# Patient Record
Sex: FEMALE | Race: WHITE | NOT HISPANIC OR LATINO | Employment: FULL TIME | URBAN - METROPOLITAN AREA
[De-identification: names, ages, dates, MRNs, and addresses within clinical notes are randomized per-mention and may not be internally consistent; named-entity substitution may affect disease eponyms.]

---

## 2023-05-04 ENCOUNTER — OFFICE VISIT (OUTPATIENT)
Dept: PRIMARY CARE | Facility: CLINIC | Age: 48
End: 2023-05-04
Payer: COMMERCIAL

## 2023-05-04 VITALS
DIASTOLIC BLOOD PRESSURE: 78 MMHG | WEIGHT: 267.8 LBS | SYSTOLIC BLOOD PRESSURE: 124 MMHG | BODY MASS INDEX: 41.32 KG/M2 | TEMPERATURE: 97.7 F

## 2023-05-04 DIAGNOSIS — F41.9 ANXIETY: Primary | ICD-10-CM

## 2023-05-04 PROCEDURE — 99213 OFFICE O/P EST LOW 20 MIN: CPT | Performed by: INTERNAL MEDICINE

## 2023-05-04 PROCEDURE — 3008F BODY MASS INDEX DOCD: CPT | Performed by: INTERNAL MEDICINE

## 2023-05-04 RX ORDER — DIAPER,BRIEF,INFANT-TODD,DISP
1 EACH MISCELLANEOUS DAILY
COMMUNITY
Start: 2022-06-14 | End: 2024-02-08 | Stop reason: HOSPADM

## 2023-05-04 RX ORDER — VENLAFAXINE HYDROCHLORIDE 150 MG/1
150 CAPSULE, EXTENDED RELEASE ORAL DAILY
COMMUNITY
End: 2023-05-15

## 2023-05-04 RX ORDER — ALPRAZOLAM 0.25 MG/1
0.25 TABLET ORAL 3 TIMES DAILY PRN
Qty: 30 TABLET | Refills: 0 | Status: SHIPPED | OUTPATIENT
Start: 2023-05-04 | End: 2023-05-28 | Stop reason: SINTOL

## 2023-05-04 RX ORDER — ZINC GLUCONATE 50 MG
1 TABLET ORAL DAILY
COMMUNITY
Start: 2022-06-14

## 2023-05-04 RX ORDER — VITAMIN A 3000 MCG
10000 CAPSULE ORAL DAILY
COMMUNITY
Start: 2022-06-14

## 2023-05-04 RX ORDER — MULTIVIT WITH MINERALS/HERBS
1 TABLET ORAL DAILY
COMMUNITY
Start: 2022-06-14

## 2023-05-04 RX ORDER — LORATADINE 10 MG/1
TABLET ORAL
COMMUNITY
Start: 2017-09-13 | End: 2024-02-06 | Stop reason: ENTERED-IN-ERROR

## 2023-05-04 RX ORDER — PANTOPRAZOLE SODIUM 40 MG/1
1 TABLET, DELAYED RELEASE ORAL 2 TIMES DAILY
COMMUNITY
Start: 2018-01-28

## 2023-05-04 ASSESSMENT — PATIENT HEALTH QUESTIONNAIRE - PHQ9
SUM OF ALL RESPONSES TO PHQ9 QUESTIONS 1 AND 2: 6
1. LITTLE INTEREST OR PLEASURE IN DOING THINGS: NEARLY EVERY DAY
2. FEELING DOWN, DEPRESSED OR HOPELESS: NEARLY EVERY DAY

## 2023-05-04 NOTE — PROGRESS NOTES
Subjective   Patient ID: [id number]   [facility]    Gonzalez Jason is a 48 y.o. female who presents for Panic Attack (Shes taking effexor- been under amount of stress with her daughter. ) and Back Pain (Back and neck pain been happening about three weeks).  She does not have any fever, cough or shortness of breath. She is having difficulty to sleep for her anxiety.      Objective   Visit Vitals  /78 (BP Location: Left arm)   Temp 36.5 °C (97.7 °F) (Temporal)      Review of Systems  All 12 systems reviewed, no other abnormality except that mentioned in HPI.    Physical Exam  Constitutional- no abnormality  ENT- no abnormality  Neck- no swelling  CVS- normal S1 and S2, no murmur.  Pulmonary- clear to auscultation,no rhonchi, no wheezes.  Abdomen- normal- liver and spleen, soft, no distension, bowel sound present.  Neurological- all cranial nerves intact, speech and gait normal, no sensory or motor deficiency.  Musculoskeletal- all pulses are normal, normal movement, no joint swelling.  Skin- no rash, dry.  psychiatry- no suicidal ideation.  Genitourinary system- no abnormality.  Lymph node- no lyphadenopathy      Assessment/Plan   Problem List Items Addressed This Visit    None  Visit Diagnoses       Anxiety    -  Primary    Relevant Medications    ALPRAZolam (Xanax) 0.25 mg tablet        Does not want to take any other medications now, she will try with this medication to have a good sleep to deal with this problem.    Gilda Mendez MD

## 2023-05-10 DIAGNOSIS — F41.9 ANXIETY AND DEPRESSION: Primary | ICD-10-CM

## 2023-05-10 DIAGNOSIS — F32.A ANXIETY AND DEPRESSION: Primary | ICD-10-CM

## 2023-05-14 DIAGNOSIS — F32.A ANXIETY AND DEPRESSION: Primary | ICD-10-CM

## 2023-05-14 DIAGNOSIS — F41.9 ANXIETY AND DEPRESSION: Primary | ICD-10-CM

## 2023-05-15 RX ORDER — VENLAFAXINE HYDROCHLORIDE 150 MG/1
CAPSULE, EXTENDED RELEASE ORAL
Qty: 90 CAPSULE | Refills: 2 | Status: SHIPPED | OUTPATIENT
Start: 2023-05-15 | End: 2023-10-04

## 2023-05-26 ENCOUNTER — OFFICE VISIT (OUTPATIENT)
Dept: PRIMARY CARE | Facility: CLINIC | Age: 48
End: 2023-05-26
Payer: COMMERCIAL

## 2023-05-26 VITALS
WEIGHT: 269 LBS | BODY MASS INDEX: 41.51 KG/M2 | DIASTOLIC BLOOD PRESSURE: 82 MMHG | SYSTOLIC BLOOD PRESSURE: 134 MMHG | TEMPERATURE: 97.6 F

## 2023-05-26 DIAGNOSIS — R73.01 IMPAIRED FASTING BLOOD SUGAR: ICD-10-CM

## 2023-05-26 DIAGNOSIS — Z12.39 BREAST SCREENING: ICD-10-CM

## 2023-05-26 DIAGNOSIS — Z00.00 ROUTINE GENERAL MEDICAL EXAMINATION AT A HEALTH CARE FACILITY: ICD-10-CM

## 2023-05-26 DIAGNOSIS — G47.00 INSOMNIA, UNSPECIFIED TYPE: Primary | ICD-10-CM

## 2023-05-26 DIAGNOSIS — M79.10 MYALGIA: ICD-10-CM

## 2023-05-26 PROCEDURE — 99213 OFFICE O/P EST LOW 20 MIN: CPT | Performed by: INTERNAL MEDICINE

## 2023-05-26 PROCEDURE — 3008F BODY MASS INDEX DOCD: CPT | Performed by: INTERNAL MEDICINE

## 2023-05-26 PROCEDURE — 1036F TOBACCO NON-USER: CPT | Performed by: INTERNAL MEDICINE

## 2023-05-26 RX ORDER — LISDEXAMFETAMINE DIMESYLATE 30 MG/1
1 CAPSULE ORAL DAILY
COMMUNITY
Start: 2023-05-18 | End: 2023-10-04

## 2023-05-26 RX ORDER — VENLAFAXINE HYDROCHLORIDE 75 MG/1
75 CAPSULE, EXTENDED RELEASE ORAL DAILY
COMMUNITY
Start: 2021-06-02 | End: 2023-05-28

## 2023-05-26 RX ORDER — TRAZODONE HYDROCHLORIDE 50 MG/1
50 TABLET ORAL NIGHTLY PRN
Qty: 90 TABLET | Refills: 1 | Status: SHIPPED | OUTPATIENT
Start: 2023-05-26 | End: 2023-11-20

## 2023-05-26 NOTE — PROGRESS NOTES
Subjective   Patient ID: Gonzalez Jason is a 48 y.o. female who presents for Follow-up (Anxiety/ ADD / lymphedema).    HPI   Follow-up insomnia and mood disorder.  Significant stress.  Leading to diffuse achiness.  Insomnia.  Working with psychiatry for ADD.  Review of Systems    Objective   /82   Temp 36.4 °C (97.6 °F)   Wt 122 kg (269 lb)   BMI 41.51 kg/m²     Physical Exam    Assessment/Plan     #1 COVID-recovered.  #2 s/p sleeve surgery- 2021. signif wt loss. follows w/ surgery in [place]. following labs there   #3 IFBS-good on last test. Patient will obtain labs. Continue lifestyle  #4 htn-good without treatment and surgery  #5 GERD-controlled with PPI.  #6 depression/anxiety-significant increase stress.  Overall doing well.  But certainly an issue.  No significant benefit from increase venlafaxine.  Reduce back to 150.  Follow as we add trazodone for insomnia.  Reviewed use risk and side effects.  Did discuss risks of serotonin syndrome.  #7 hepatic hemangioma- ultrasound last year in [place] with reduced size. Per hepatobiliary surgery no follow-up recommended. Will consider imaging next visit.  #8 ADD-recently started stimulant with psychiatry.  Continue.  Follow-up psychiatry at least for 1 or 2 more visits.  Tolerating and does seem to be helping has not made anxiety/insomnia worse.  mammo 6/22  Repeat cologuard pending-insufficient stool on last test.  Stressed importance of having this done.  lipids- will bring labs next visit (+fhx CAD). c/s repeat CACS . Strong family history of CAD. Consider low-dose statin

## 2023-06-01 ENCOUNTER — LAB (OUTPATIENT)
Dept: LAB | Facility: LAB | Age: 48
End: 2023-06-01
Payer: COMMERCIAL

## 2023-06-01 ENCOUNTER — APPOINTMENT (OUTPATIENT)
Dept: PRIMARY CARE | Facility: CLINIC | Age: 48
End: 2023-06-01
Payer: COMMERCIAL

## 2023-06-01 DIAGNOSIS — R73.01 IMPAIRED FASTING BLOOD SUGAR: ICD-10-CM

## 2023-06-01 DIAGNOSIS — M79.10 MYALGIA: ICD-10-CM

## 2023-06-01 LAB
ESTIMATED AVERAGE GLUCOSE FOR HBA1C: 103 MG/DL
HEMOGLOBIN A1C/HEMOGLOBIN TOTAL IN BLOOD: 5.2 %
SEDIMENTATION RATE, ERYTHROCYTE: 10 MM/H (ref 0–20)

## 2023-06-01 PROCEDURE — 83036 HEMOGLOBIN GLYCOSYLATED A1C: CPT

## 2023-06-01 PROCEDURE — 36415 COLL VENOUS BLD VENIPUNCTURE: CPT

## 2023-06-01 PROCEDURE — 85652 RBC SED RATE AUTOMATED: CPT

## 2023-08-25 DIAGNOSIS — U07.1 COVID-19: Primary | ICD-10-CM

## 2023-08-30 DIAGNOSIS — Z12.11 COLON CANCER SCREENING: ICD-10-CM

## 2023-09-20 ENCOUNTER — APPOINTMENT (OUTPATIENT)
Dept: PRIMARY CARE | Facility: CLINIC | Age: 48
End: 2023-09-20
Payer: COMMERCIAL

## 2023-09-20 ENCOUNTER — TELEPHONE (OUTPATIENT)
Dept: PRIMARY CARE | Facility: CLINIC | Age: 48
End: 2023-09-20

## 2023-09-20 ENCOUNTER — LAB (OUTPATIENT)
Dept: LAB | Facility: LAB | Age: 48
End: 2023-09-20
Payer: COMMERCIAL

## 2023-09-20 DIAGNOSIS — R35.0 URINARY FREQUENCY: ICD-10-CM

## 2023-09-20 LAB
APPEARANCE, URINE: ABNORMAL
BACTERIA, URINE: ABNORMAL /HPF
BILIRUBIN, URINE: NEGATIVE
BLOOD, URINE: ABNORMAL
COLOR, URINE: YELLOW
GLUCOSE, URINE: NEGATIVE MG/DL
KETONES, URINE: NEGATIVE MG/DL
LEUKOCYTE ESTERASE, URINE: ABNORMAL
MUCUS, URINE: ABNORMAL /LPF
NITRITE, URINE: NEGATIVE
PH, URINE: 6 (ref 5–8)
PROTEIN, URINE: ABNORMAL MG/DL
RBC, URINE: 149 /HPF (ref 0–5)
RENAL EPITHELIAL CELLS, URINE: 3 /HPF
SPECIFIC GRAVITY, URINE: 1.02 (ref 1–1.03)
SQUAMOUS EPITHELIAL CELLS, URINE: 41 /HPF
UROBILINOGEN, URINE: <2 MG/DL (ref 0–1.9)
WBC, URINE: >182 /HPF (ref 0–5)

## 2023-09-20 PROCEDURE — 87086 URINE CULTURE/COLONY COUNT: CPT

## 2023-09-20 PROCEDURE — 81001 URINALYSIS AUTO W/SCOPE: CPT

## 2023-09-20 NOTE — TELEPHONE ENCOUNTER
Pt left a msg stating that she has a UTI and is looking either to drop off a sample or get a script.  Her concern is that she is leaving out of town on Friday, and sometimes gets kidney stones with her UTI's.

## 2023-09-21 LAB — URINE CULTURE: NORMAL

## 2023-09-22 ENCOUNTER — TELEPHONE (OUTPATIENT)
Dept: PRIMARY CARE | Facility: CLINIC | Age: 48
End: 2023-09-22
Payer: COMMERCIAL

## 2023-09-22 DIAGNOSIS — R31.9 URINARY TRACT INFECTION WITH HEMATURIA, SITE UNSPECIFIED: ICD-10-CM

## 2023-09-22 DIAGNOSIS — N39.0 URINARY TRACT INFECTION WITH HEMATURIA, SITE UNSPECIFIED: ICD-10-CM

## 2023-09-22 RX ORDER — NITROFURANTOIN 25; 75 MG/1; MG/1
100 CAPSULE ORAL 2 TIMES DAILY
Qty: 10 CAPSULE | Refills: 0 | Status: SHIPPED | OUTPATIENT
Start: 2023-09-22 | End: 2023-09-27

## 2023-09-25 ENCOUNTER — OFFICE VISIT (OUTPATIENT)
Dept: PRIMARY CARE | Facility: CLINIC | Age: 48
End: 2023-09-25
Payer: COMMERCIAL

## 2023-09-25 VITALS
TEMPERATURE: 98.5 F | BODY MASS INDEX: 41.47 KG/M2 | SYSTOLIC BLOOD PRESSURE: 126 MMHG | HEIGHT: 69 IN | WEIGHT: 280 LBS | DIASTOLIC BLOOD PRESSURE: 88 MMHG

## 2023-09-25 DIAGNOSIS — N20.0 NEPHROLITHIASIS: Primary | ICD-10-CM

## 2023-09-25 DIAGNOSIS — Z98.84 H/O GASTRIC BYPASS: ICD-10-CM

## 2023-09-25 DIAGNOSIS — Z00.00 ROUTINE GENERAL MEDICAL EXAMINATION AT A HEALTH CARE FACILITY: ICD-10-CM

## 2023-09-25 PROCEDURE — 3008F BODY MASS INDEX DOCD: CPT | Performed by: INTERNAL MEDICINE

## 2023-09-25 PROCEDURE — 99214 OFFICE O/P EST MOD 30 MIN: CPT | Performed by: INTERNAL MEDICINE

## 2023-09-25 PROCEDURE — 1036F TOBACCO NON-USER: CPT | Performed by: INTERNAL MEDICINE

## 2023-09-25 RX ORDER — CEFPODOXIME PROXETIL 200 MG/1
200 TABLET, FILM COATED ORAL 2 TIMES DAILY
COMMUNITY
Start: 2023-09-22 | End: 2023-10-02

## 2023-09-25 ASSESSMENT — PROMIS GLOBAL HEALTH SCALE
CARRYOUT_SOCIAL_ACTIVITIES: GOOD
CARRYOUT_PHYSICAL_ACTIVITIES: MODERATELY
RATE_QUALITY_OF_LIFE: GOOD
EMOTIONAL_PROBLEMS: SOMETIMES
RATE_PHYSICAL_HEALTH: GOOD
RATE_GENERAL_HEALTH: FAIR
RATE_AVERAGE_FATIGUE: MILD
RATE_AVERAGE_PAIN: 1
RATE_SOCIAL_SATISFACTION: GOOD
RATE_MENTAL_HEALTH: GOOD

## 2023-09-25 ASSESSMENT — PATIENT HEALTH QUESTIONNAIRE - PHQ9
1. LITTLE INTEREST OR PLEASURE IN DOING THINGS: SEVERAL DAYS
SUM OF ALL RESPONSES TO PHQ9 QUESTIONS 1 AND 2: 2
2. FEELING DOWN, DEPRESSED OR HOPELESS: SEVERAL DAYS

## 2023-09-25 NOTE — PROGRESS NOTES
"Subjective   Patient ID: Gonzalez Jason is a 48 y.o. female who presents for Annual Exam (Health form) and Flu Vaccine.    HPI   About 10 days ago began having some urinary pressure sensation as well as right flank pain.  Significantly increased 4 days prior to visit.  Presented to emergency department.  Ultimately diagnosed with pyelonephritis and placed on antibiotics.  Was having fevers to 102 degrees.  Continues now with low-grade fevers.  Ongoing flank pain although improved.  Nausea persist but perhaps a little better.  Question of stone seen on CT.  Does have history of stones (no clear stone seen on CT per report but per ED stone noted)    Review of Systems  All systems reviewed and negative except as per history of present illness  Objective   /88   Ht 1.753 m (5' 9\")   Wt 127 kg (280 lb)   BMI 41.35 kg/m²     Physical Exam    Assessment/Plan     Apparent pyelonephritis.  Question of stones.  Her demeanor of pacing with pain crampy flank pain etc. certainly seems like a stone.  Urinalysis and culture reviewed from [facility], on susceptible antibiotic.  We will continue current treatment for now.  Follow-up 1 day.  Asked patient to go directly to the ED any progression.  Consult urology.    [place] pending 10/23       "

## 2023-09-26 ENCOUNTER — LAB (OUTPATIENT)
Dept: LAB | Facility: LAB | Age: 48
End: 2023-09-26
Payer: COMMERCIAL

## 2023-09-26 DIAGNOSIS — Z00.00 ROUTINE GENERAL MEDICAL EXAMINATION AT A HEALTH CARE FACILITY: ICD-10-CM

## 2023-09-26 DIAGNOSIS — Z98.84 H/O GASTRIC BYPASS: ICD-10-CM

## 2023-09-26 DIAGNOSIS — N20.0 NEPHROLITHIASIS: ICD-10-CM

## 2023-09-26 LAB
ALANINE AMINOTRANSFERASE (SGPT) (U/L) IN SER/PLAS: 61 U/L (ref 7–45)
ALBUMIN (G/DL) IN SER/PLAS: 3.5 G/DL (ref 3.4–5)
ALKALINE PHOSPHATASE (U/L) IN SER/PLAS: 99 U/L (ref 33–110)
ANION GAP IN SER/PLAS: 11 MMOL/L (ref 10–20)
APPEARANCE, URINE: ABNORMAL
ASPARTATE AMINOTRANSFERASE (SGOT) (U/L) IN SER/PLAS: 28 U/L (ref 9–39)
BILIRUBIN TOTAL (MG/DL) IN SER/PLAS: 0.6 MG/DL (ref 0–1.2)
BILIRUBIN, URINE: NEGATIVE
BLOOD, URINE: ABNORMAL
CALCIUM (MG/DL) IN SER/PLAS: 8.5 MG/DL (ref 8.6–10.3)
CARBON DIOXIDE, TOTAL (MMOL/L) IN SER/PLAS: 31 MMOL/L (ref 21–32)
CHLORIDE (MMOL/L) IN SER/PLAS: 102 MMOL/L (ref 98–107)
CHOLESTEROL (MG/DL) IN SER/PLAS: 182 MG/DL (ref 0–199)
CHOLESTEROL IN HDL (MG/DL) IN SER/PLAS: 53 MG/DL
CHOLESTEROL/HDL RATIO: 3.4
COBALAMIN (VITAMIN B12) (PG/ML) IN SER/PLAS: 520 PG/ML (ref 211–911)
COLOR, URINE: YELLOW
CREATININE (MG/DL) IN SER/PLAS: 0.63 MG/DL (ref 0.5–1.05)
ERYTHROCYTE DISTRIBUTION WIDTH (RATIO) BY AUTOMATED COUNT: 12.8 % (ref 11.5–14.5)
ERYTHROCYTE MEAN CORPUSCULAR HEMOGLOBIN CONCENTRATION (G/DL) BY AUTOMATED: 32.2 G/DL (ref 32–36)
ERYTHROCYTE MEAN CORPUSCULAR VOLUME (FL) BY AUTOMATED COUNT: 93 FL (ref 80–100)
ERYTHROCYTES (10*6/UL) IN BLOOD BY AUTOMATED COUNT: 4.92 X10E12/L (ref 4–5.2)
FERRITIN (UG/LL) IN SER/PLAS: 76 UG/L (ref 8–150)
FOLATE (NG/ML) IN SER/PLAS: 14.7 NG/ML
GFR FEMALE: >90 ML/MIN/1.73M2
GLUCOSE (MG/DL) IN SER/PLAS: 84 MG/DL (ref 74–99)
GLUCOSE, URINE: NEGATIVE MG/DL
HEMATOCRIT (%) IN BLOOD BY AUTOMATED COUNT: 45.7 % (ref 36–46)
HEMOGLOBIN (G/DL) IN BLOOD: 14.7 G/DL (ref 12–16)
IRON (UG/DL) IN SER/PLAS: 72 UG/DL (ref 35–150)
IRON BINDING CAPACITY (UG/DL) IN SER/PLAS: 315 UG/DL (ref 240–445)
IRON SATURATION (%) IN SER/PLAS: 23 % (ref 25–45)
KETONES, URINE: NEGATIVE MG/DL
LDL: 109 MG/DL (ref 0–99)
LEUKOCYTE ESTERASE, URINE: ABNORMAL
LEUKOCYTES (10*3/UL) IN BLOOD BY AUTOMATED COUNT: 5.6 X10E9/L (ref 4.4–11.3)
LIPASE (U/L) IN SER/PLAS: 10 U/L (ref 9–82)
MUCUS, URINE: ABNORMAL /LPF
NITRITE, URINE: NEGATIVE
PH, URINE: 6 (ref 5–8)
PLATELETS (10*3/UL) IN BLOOD AUTOMATED COUNT: 288 X10E9/L (ref 150–450)
POTASSIUM (MMOL/L) IN SER/PLAS: 4.7 MMOL/L (ref 3.5–5.3)
PROTEIN TOTAL: 6 G/DL (ref 6.4–8.2)
PROTEIN, URINE: ABNORMAL MG/DL
RBC, URINE: 100 /HPF (ref 0–5)
SODIUM (MMOL/L) IN SER/PLAS: 139 MMOL/L (ref 136–145)
SPECIFIC GRAVITY, URINE: 1.02 (ref 1–1.03)
SQUAMOUS EPITHELIAL CELLS, URINE: 21 /HPF
TRIGLYCERIDE (MG/DL) IN SER/PLAS: 98 MG/DL (ref 0–149)
UREA NITROGEN (MG/DL) IN SER/PLAS: 13 MG/DL (ref 6–23)
UROBILINOGEN, URINE: 2 MG/DL (ref 0–1.9)
VLDL: 20 MG/DL (ref 0–40)
WBC, URINE: 38 /HPF (ref 0–5)

## 2023-09-26 PROCEDURE — 80061 LIPID PANEL: CPT

## 2023-09-26 PROCEDURE — 83540 ASSAY OF IRON: CPT

## 2023-09-26 PROCEDURE — 82607 VITAMIN B-12: CPT

## 2023-09-26 PROCEDURE — 82746 ASSAY OF FOLIC ACID SERUM: CPT

## 2023-09-26 PROCEDURE — 83550 IRON BINDING TEST: CPT

## 2023-09-26 PROCEDURE — 80053 COMPREHEN METABOLIC PANEL: CPT

## 2023-09-26 PROCEDURE — 81001 URINALYSIS AUTO W/SCOPE: CPT

## 2023-09-26 PROCEDURE — 83690 ASSAY OF LIPASE: CPT

## 2023-09-26 PROCEDURE — 85027 COMPLETE CBC AUTOMATED: CPT

## 2023-09-26 PROCEDURE — 87086 URINE CULTURE/COLONY COUNT: CPT

## 2023-09-26 PROCEDURE — 36415 COLL VENOUS BLD VENIPUNCTURE: CPT

## 2023-09-26 PROCEDURE — 82728 ASSAY OF FERRITIN: CPT

## 2023-09-26 PROCEDURE — 84425 ASSAY OF VITAMIN B-1: CPT

## 2023-09-28 LAB — URINE CULTURE: NORMAL

## 2023-10-02 ENCOUNTER — OFFICE VISIT (OUTPATIENT)
Dept: PRIMARY CARE | Facility: CLINIC | Age: 48
End: 2023-10-02
Payer: COMMERCIAL

## 2023-10-02 VITALS — TEMPERATURE: 97.9 F | SYSTOLIC BLOOD PRESSURE: 126 MMHG | DIASTOLIC BLOOD PRESSURE: 80 MMHG

## 2023-10-02 DIAGNOSIS — N10 ACUTE PYELONEPHRITIS: ICD-10-CM

## 2023-10-02 DIAGNOSIS — Z98.84 H/O GASTRIC BYPASS: ICD-10-CM

## 2023-10-02 DIAGNOSIS — R74.8 ELEVATED LIVER ENZYMES: ICD-10-CM

## 2023-10-02 DIAGNOSIS — M54.31 SCIATICA OF RIGHT SIDE: Primary | ICD-10-CM

## 2023-10-02 DIAGNOSIS — E66.01 CLASS 3 SEVERE OBESITY WITHOUT SERIOUS COMORBIDITY WITH BODY MASS INDEX (BMI) OF 40.0 TO 44.9 IN ADULT, UNSPECIFIED OBESITY TYPE (MULTI): ICD-10-CM

## 2023-10-02 LAB — VITAMIN B1, WHOLE BLOOD: 160 NMOL/L (ref 70–180)

## 2023-10-02 PROCEDURE — 99214 OFFICE O/P EST MOD 30 MIN: CPT | Performed by: INTERNAL MEDICINE

## 2023-10-02 PROCEDURE — 1036F TOBACCO NON-USER: CPT | Performed by: INTERNAL MEDICINE

## 2023-10-02 PROCEDURE — 3008F BODY MASS INDEX DOCD: CPT | Performed by: INTERNAL MEDICINE

## 2023-10-02 NOTE — PROGRESS NOTES
Subjective   Patient ID: Gonzalez Jason is a 48 y.o. female who presents for follow up results.    HPI   Over past years w/ episodic rt back pain (1-2x/week) lasting minutes    Pelvic pain/dysuria/freq/nocutia ~ 2weeks PTV.  Progressed.    ~10 days PTV rt LBP--> rt posterior leg to knee    Then chills, nausea and increased pain (pelvis/back)    Review of Systems      Lab Results   Component Value Date    WBC 5.6 09/26/2023    HGB 14.7 09/26/2023    HCT 45.7 09/26/2023     09/26/2023    CHOL 182 09/26/2023    TRIG 98 09/26/2023    HDL 53.0 09/26/2023    ALT 61 (H) 09/26/2023    AST 28 09/26/2023     09/26/2023    K 4.7 09/26/2023     09/26/2023    CREATININE 0.63 09/26/2023    BUN 13 09/26/2023    CO2 31 09/26/2023    TSH 1.12 08/29/2022    HGBA1C 5.2 06/01/2023      Objective   /80   Temp 36.6 °C (97.9 °F)     Physical Exam  Alert and oriented x3.  No acute distress.  Some tenderness to palpation right SI joint.  Positive straight leg raise on the right at 40 degrees.  DTRs normal bilateral lower extremities.  Strength normal.  Assessment/Plan     [place]it[place]-gradually improving.  Consult PT and pain medicine.    Pylonephritis-clinically resolved.  Recheck urinalysis and culture today.  Appointment pending with urology      ALT-suspect fatty liver.  Reviewed at length.  Recheck labs with full panel.  Further evaluation pending

## 2023-10-04 ENCOUNTER — OFFICE VISIT (OUTPATIENT)
Dept: BEHAVIORAL HEALTH | Facility: CLINIC | Age: 48
End: 2023-10-04
Payer: COMMERCIAL

## 2023-10-04 VITALS
HEART RATE: 73 BPM | WEIGHT: 279.06 LBS | TEMPERATURE: 97.8 F | SYSTOLIC BLOOD PRESSURE: 130 MMHG | BODY MASS INDEX: 41.33 KG/M2 | RESPIRATION RATE: 20 BRPM | DIASTOLIC BLOOD PRESSURE: 96 MMHG | HEIGHT: 69 IN

## 2023-10-04 DIAGNOSIS — F32.A ANXIETY AND DEPRESSION: ICD-10-CM

## 2023-10-04 DIAGNOSIS — F41.9 ANXIETY AND DEPRESSION: ICD-10-CM

## 2023-10-04 DIAGNOSIS — F33.41 RECURRENT MAJOR DEPRESSIVE DISORDER, IN PARTIAL REMISSION (CMS-HCC): ICD-10-CM

## 2023-10-04 DIAGNOSIS — F98.8 ATTENTION DEFICIT DISORDER (ADD) WITHOUT HYPERACTIVITY: ICD-10-CM

## 2023-10-04 PROCEDURE — 1036F TOBACCO NON-USER: CPT | Performed by: PSYCHIATRY & NEUROLOGY

## 2023-10-04 PROCEDURE — 3008F BODY MASS INDEX DOCD: CPT | Performed by: PSYCHIATRY & NEUROLOGY

## 2023-10-04 PROCEDURE — 99214 OFFICE O/P EST MOD 30 MIN: CPT | Performed by: PSYCHIATRY & NEUROLOGY

## 2023-10-04 RX ORDER — DULOXETIN HYDROCHLORIDE 20 MG/1
20 CAPSULE, DELAYED RELEASE ORAL DAILY
Qty: 90 CAPSULE | Refills: 0 | Status: SHIPPED | OUTPATIENT
Start: 2023-10-04 | End: 2023-11-10

## 2023-10-04 RX ORDER — LISDEXAMFETAMINE DIMESYLATE 50 MG/1
50 CAPSULE ORAL EVERY MORNING
Qty: 30 CAPSULE | Refills: 0 | Status: SHIPPED | OUTPATIENT
Start: 2023-10-04 | End: 2024-02-08 | Stop reason: HOSPADM

## 2023-10-04 RX ORDER — LISDEXAMFETAMINE DIMESYLATE 50 MG/1
50 CAPSULE ORAL EVERY MORNING
Qty: 30 CAPSULE | Refills: 0 | Status: SHIPPED | OUTPATIENT
Start: 2023-11-03 | End: 2024-02-08 | Stop reason: HOSPADM

## 2023-10-04 RX ORDER — LISDEXAMFETAMINE DIMESYLATE 50 MG/1
50 CAPSULE ORAL EVERY MORNING
Qty: 30 CAPSULE | Refills: 0 | Status: SHIPPED | OUTPATIENT
Start: 2023-12-03 | End: 2024-02-08 | Stop reason: HOSPADM

## 2023-10-04 RX ORDER — VENLAFAXINE HYDROCHLORIDE 37.5 MG/1
37.5 CAPSULE, EXTENDED RELEASE ORAL DAILY
Qty: 90 CAPSULE | Refills: 0 | Status: SHIPPED | OUTPATIENT
Start: 2023-10-04 | End: 2024-01-02

## 2023-10-04 ASSESSMENT — ENCOUNTER SYMPTOMS
NERVOUS/ANXIOUS: 1
DYSPHORIC MOOD: 1

## 2023-10-04 NOTE — PROGRESS NOTES
Subjective   Patient ID: Gonzalez Jason is a 48 y.o. female who presents for No chief complaint on file..  HPI patient seen interval psych history reviewed.  The patient has become more anxious and more depressed since the mid summer.  Note we had gone down on the Effexor 250 mg a day from 225 mg due to concerns about blood pressure.  In fact blood pressure at with the Vyvanse and Effexor at the current dose of 150 is elevated.  Discussed changing antidepressant rather than risking going up on the Effexor again.    Review of Systems   Psychiatric/Behavioral:  Positive for dysphoric mood. The patient is nervous/anxious.        Objective   Physical Exam  Psychiatric:         Attention and Perception: Attention and perception normal.         Mood and Affect: Affect normal. Mood is anxious and depressed.         Speech: Speech normal.         Behavior: Behavior normal. Behavior is cooperative.         Thought Content: Thought content normal.         Cognition and Memory: Cognition and memory normal.         Judgment: Judgment normal.         Assessment/Plan   Problem List Items Addressed This Visit             ICD-10-CM    Depression F32.A    Relevant Medications    venlafaxine XR (Effexor-XR) 37.5 mg 24 hr capsule    DULoxetine (Cymbalta) 20 mg DR capsule    Anxiety and depression F41.9, F32.A     We will cross titrate off of Effexor XR and onto duloxetine.  Patient also has pain issues in addition to anxiety and depression and therefore we will titrate duloxetine up to 60 mg daily while weaning Effexor XR down by 37.5 mg/week and keep the 37.5 mg the final 2 weeks while she is on the 60 mg of Cymbalta.  Vyvanse will remain at 50 mg daily for the attention deficit disorder check blood pressures carefully.          Other Visit Diagnoses         Codes    Attention deficit disorder (ADD) without hyperactivity     F98.8    Relevant Medications    lisdexamfetamine (Vyvanse) 50 mg capsule    lisdexamfetamine (Vyvanse) 50 mg  capsule (Start on 11/3/2023)    lisdexamfetamine (Vyvanse) 50 mg capsule (Start on 12/3/2023)

## 2023-10-04 NOTE — ASSESSMENT & PLAN NOTE
We will cross titrate off of Effexor XR and onto duloxetine.  Patient also has pain issues in addition to anxiety and depression and therefore we will titrate duloxetine up to 60 mg daily while weaning Effexor XR down by 37.5 mg/week and keep the 37.5 mg the final 2 weeks while she is on the 60 mg of Cymbalta.  Vyvanse will remain at 50 mg daily for the attention deficit disorder check blood pressures carefully.

## 2023-10-19 ENCOUNTER — LAB (OUTPATIENT)
Dept: LAB | Facility: LAB | Age: 48
End: 2023-10-19
Payer: COMMERCIAL

## 2023-10-19 DIAGNOSIS — N10 ACUTE PYELONEPHRITIS: ICD-10-CM

## 2023-10-19 DIAGNOSIS — M54.31 SCIATICA OF RIGHT SIDE: ICD-10-CM

## 2023-10-19 DIAGNOSIS — R74.8 ELEVATED LIVER ENZYMES: ICD-10-CM

## 2023-10-19 LAB
ALBUMIN SERPL BCP-MCNC: 3.9 G/DL (ref 3.4–5)
ALP SERPL-CCNC: 57 U/L (ref 33–110)
ALT SERPL W P-5'-P-CCNC: 20 U/L (ref 7–45)
ANION GAP SERPL CALC-SCNC: 11 MMOL/L (ref 10–20)
APPEARANCE UR: ABNORMAL
AST SERPL W P-5'-P-CCNC: 13 U/L (ref 9–39)
BILIRUB DIRECT SERPL-MCNC: 0.1 MG/DL (ref 0–0.3)
BILIRUB SERPL-MCNC: 0.6 MG/DL (ref 0–1.2)
BILIRUB UR STRIP.AUTO-MCNC: NEGATIVE MG/DL
BUN SERPL-MCNC: 16 MG/DL (ref 6–23)
CALCIUM SERPL-MCNC: 9 MG/DL (ref 8.6–10.3)
CHLORIDE SERPL-SCNC: 101 MMOL/L (ref 98–107)
CO2 SERPL-SCNC: 31 MMOL/L (ref 21–32)
COLOR UR: YELLOW
CREAT SERPL-MCNC: 0.57 MG/DL (ref 0.5–1.05)
ERYTHROCYTE [DISTWIDTH] IN BLOOD BY AUTOMATED COUNT: 13.2 % (ref 11.5–14.5)
FERRITIN SERPL-MCNC: 35 NG/ML (ref 8–150)
GFR SERPL CREATININE-BSD FRML MDRD: >90 ML/MIN/1.73M*2
GLUCOSE SERPL-MCNC: 106 MG/DL (ref 74–99)
GLUCOSE UR STRIP.AUTO-MCNC: NEGATIVE MG/DL
HBV SURFACE AG SERPL QL IA: NONREACTIVE
HCT VFR BLD AUTO: 46.9 % (ref 36–46)
HCV AB SER QL: NONREACTIVE
HGB BLD-MCNC: 15.1 G/DL (ref 12–16)
IRON SATN MFR SERPL: 12 % (ref 25–45)
IRON SERPL-MCNC: 42 UG/DL (ref 35–150)
KETONES UR STRIP.AUTO-MCNC: NEGATIVE MG/DL
LEUKOCYTE ESTERASE UR QL STRIP.AUTO: NEGATIVE
MCH RBC QN AUTO: 29.5 PG (ref 26–34)
MCHC RBC AUTO-ENTMCNC: 32.2 G/DL (ref 32–36)
MCV RBC AUTO: 92 FL (ref 80–100)
NITRITE UR QL STRIP.AUTO: NEGATIVE
NRBC BLD-RTO: 0 /100 WBCS (ref 0–0)
PH UR STRIP.AUTO: 7 [PH]
PLATELET # BLD AUTO: 307 X10*3/UL (ref 150–450)
PMV BLD AUTO: 10.3 FL (ref 7.5–11.5)
POTASSIUM SERPL-SCNC: 4.2 MMOL/L (ref 3.5–5.3)
PROT SERPL-MCNC: 6.3 G/DL (ref 6.4–8.2)
PROT UR STRIP.AUTO-MCNC: NEGATIVE MG/DL
RBC # BLD AUTO: 5.12 X10*6/UL (ref 4–5.2)
RBC # UR STRIP.AUTO: NEGATIVE /UL
SODIUM SERPL-SCNC: 139 MMOL/L (ref 136–145)
SP GR UR STRIP.AUTO: 1.01
TIBC SERPL-MCNC: 341 UG/DL (ref 240–445)
UIBC SERPL-MCNC: 299 UG/DL (ref 110–370)
UROBILINOGEN UR STRIP.AUTO-MCNC: <2 MG/DL
WBC # BLD AUTO: 11.6 X10*3/UL (ref 4.4–11.3)

## 2023-10-19 PROCEDURE — 87340 HEPATITIS B SURFACE AG IA: CPT

## 2023-10-19 PROCEDURE — 86803 HEPATITIS C AB TEST: CPT

## 2023-10-19 PROCEDURE — 80053 COMPREHEN METABOLIC PANEL: CPT

## 2023-10-19 PROCEDURE — 36415 COLL VENOUS BLD VENIPUNCTURE: CPT

## 2023-10-19 PROCEDURE — 83540 ASSAY OF IRON: CPT

## 2023-10-19 PROCEDURE — 82728 ASSAY OF FERRITIN: CPT

## 2023-10-19 PROCEDURE — 81003 URINALYSIS AUTO W/O SCOPE: CPT

## 2023-10-19 PROCEDURE — 82248 BILIRUBIN DIRECT: CPT

## 2023-10-19 PROCEDURE — 83550 IRON BINDING TEST: CPT

## 2023-10-19 PROCEDURE — 85027 COMPLETE CBC AUTOMATED: CPT

## 2023-11-06 PROBLEM — G43.909 MIGRAINE: Status: ACTIVE | Noted: 2023-11-06

## 2023-11-06 PROBLEM — N12 PYELONEPHRITIS: Status: ACTIVE | Noted: 2023-09-22

## 2023-11-06 PROBLEM — G43.009 MIGRAINE WITHOUT AURA: Status: ACTIVE | Noted: 2019-10-27

## 2023-11-06 PROBLEM — I83.893 VARICOSE VEINS OF BOTH LOWER EXTREMITIES WITH COMPLICATIONS: Status: ACTIVE | Noted: 2023-11-06

## 2023-11-06 PROBLEM — R06.00 DYSPNEA AND RESPIRATORY ABNORMALITIES: Status: ACTIVE | Noted: 2023-11-06

## 2023-11-06 PROBLEM — Z98.84 S/P BARIATRIC SURGERY: Chronic | Status: ACTIVE | Noted: 2021-10-21

## 2023-11-06 PROBLEM — E66.812 OBESITY, CLASS II, BMI 35-39.9: Status: ACTIVE | Noted: 2021-10-21

## 2023-11-06 PROBLEM — R63.5 WEIGHT GAIN, ABNORMAL: Status: ACTIVE | Noted: 2023-11-06

## 2023-11-06 PROBLEM — F90.9 ATTENTION DEFICIT HYPERACTIVITY DISORDER (ADHD): Status: ACTIVE | Noted: 2023-11-06

## 2023-11-06 PROBLEM — D17.30: Status: ACTIVE | Noted: 2023-11-06

## 2023-11-06 PROBLEM — R74.01 ELEVATED ALT MEASUREMENT: Status: ACTIVE | Noted: 2023-11-06

## 2023-11-06 PROBLEM — H65.90 OTITIS, SEROUS: Status: ACTIVE | Noted: 2023-11-06

## 2023-11-06 PROBLEM — R06.89 DYSPNEA AND RESPIRATORY ABNORMALITIES: Status: ACTIVE | Noted: 2023-11-06

## 2023-11-06 PROBLEM — J31.0 CHRONIC RHINITIS: Status: ACTIVE | Noted: 2019-05-25

## 2023-11-06 PROBLEM — M23.91 INTERNAL DERANGEMENT OF RIGHT KNEE: Status: ACTIVE | Noted: 2023-11-06

## 2023-11-06 PROBLEM — E66.9 OBESITY, CLASS II, BMI 35-39.9: Status: ACTIVE | Noted: 2021-10-21

## 2023-11-06 PROBLEM — K13.0 LIP LESION: Status: ACTIVE | Noted: 2023-11-06

## 2023-11-06 PROBLEM — F41.1 GENERALIZED ANXIETY DISORDER: Status: ACTIVE | Noted: 2018-05-03

## 2023-11-06 PROBLEM — M23.92 INTERNAL DERANGEMENT OF LEFT KNEE: Status: ACTIVE | Noted: 2023-11-06

## 2023-11-06 PROBLEM — R63.8 INCREASED BODY MASS INDEX: Status: ACTIVE | Noted: 2018-05-03

## 2023-11-06 PROBLEM — K76.0 FATTY LIVER: Chronic | Status: ACTIVE | Noted: 2021-04-01

## 2023-11-06 PROBLEM — R09.82 POST-NASAL DRIP: Status: ACTIVE | Noted: 2023-11-06

## 2023-11-06 PROBLEM — D18.03 HEMANGIOMA OF LIVER: Status: ACTIVE | Noted: 2018-05-03

## 2023-11-06 PROBLEM — R51.9 HEADACHE: Status: ACTIVE | Noted: 2023-11-06

## 2023-11-06 PROBLEM — I48.20 CHRONIC ATRIAL FIBRILLATION, UNSPECIFIED (MULTI): Status: ACTIVE | Noted: 2023-11-06

## 2023-11-06 PROBLEM — R06.83 SNORING: Status: ACTIVE | Noted: 2023-11-06

## 2023-11-06 PROBLEM — D73.89 NODULE OF SPLEEN: Status: ACTIVE | Noted: 2023-11-06

## 2023-11-06 PROBLEM — F32.1 MODERATE MAJOR DEPRESSION (MULTI): Status: ACTIVE | Noted: 2018-12-14

## 2023-11-06 PROBLEM — J40 BRONCHITIS: Status: ACTIVE | Noted: 2023-11-06

## 2023-11-06 PROBLEM — E88.810 DYSMETABOLIC SYNDROME: Status: ACTIVE | Noted: 2023-11-06

## 2023-11-06 PROBLEM — B37.0 ORAL THRUSH: Status: ACTIVE | Noted: 2023-11-06

## 2023-11-06 PROBLEM — I87.2 VENOUS INSUFFICIENCY: Status: ACTIVE | Noted: 2023-11-06

## 2023-11-06 PROBLEM — R07.89 ATYPICAL CHEST PAIN: Status: ACTIVE | Noted: 2023-11-06

## 2023-11-06 PROBLEM — M50.90 CERVICAL DISC DISEASE: Status: ACTIVE | Noted: 2021-10-27

## 2023-11-06 RX ORDER — PREDNISONE 20 MG/1
TABLET ORAL
COMMUNITY
Start: 2023-10-11 | End: 2024-02-06 | Stop reason: ENTERED-IN-ERROR

## 2023-11-06 RX ORDER — ONDANSETRON 4 MG/1
1 TABLET, FILM COATED ORAL EVERY 8 HOURS PRN
COMMUNITY
Start: 2023-09-23

## 2023-11-06 RX ORDER — AMOXICILLIN AND CLAVULANATE POTASSIUM 875; 125 MG/1; MG/1
1 TABLET, FILM COATED ORAL 2 TIMES DAILY
COMMUNITY
Start: 2023-10-27 | End: 2023-11-06

## 2023-11-06 RX ORDER — MINERAL OIL
180 ENEMA (ML) RECTAL DAILY PRN
COMMUNITY
Start: 2023-10-27

## 2023-11-13 ENCOUNTER — TELEMEDICINE (OUTPATIENT)
Dept: BEHAVIORAL HEALTH | Facility: CLINIC | Age: 48
End: 2023-11-13
Payer: COMMERCIAL

## 2023-11-13 DIAGNOSIS — F90.0 ATTENTION DEFICIT HYPERACTIVITY DISORDER (ADHD), PREDOMINANTLY INATTENTIVE TYPE: ICD-10-CM

## 2023-11-13 DIAGNOSIS — F98.8 ATTENTION DEFICIT DISORDER (ADD) WITHOUT HYPERACTIVITY: ICD-10-CM

## 2023-11-13 DIAGNOSIS — F32.1 MODERATE MAJOR DEPRESSION (MULTI): ICD-10-CM

## 2023-11-13 PROCEDURE — 99213 OFFICE O/P EST LOW 20 MIN: CPT | Performed by: PSYCHIATRY & NEUROLOGY

## 2023-11-13 RX ORDER — LISDEXAMFETAMINE DIMESYLATE 50 MG/1
50 CAPSULE ORAL EVERY MORNING
Qty: 30 CAPSULE | Refills: 0 | Status: SHIPPED | OUTPATIENT
Start: 2023-11-13 | End: 2024-02-20 | Stop reason: WASHOUT

## 2023-11-13 ASSESSMENT — ENCOUNTER SYMPTOMS: PSYCHIATRIC NEGATIVE: 1

## 2023-11-13 NOTE — ASSESSMENT & PLAN NOTE
Continue duloxetine 60 mg daily to prevent symptom recurrence.  Patient is on an upward improvement trajectory with the duloxetine therefore we will continue the current dose to achieve maximal improvement.

## 2023-11-13 NOTE — PROGRESS NOTES
Subjective   Patient ID: Gonzalez Jason is a 48 y.o. female who presents for medication management depression and ADHD..  HPI patient reports that she really likes the Cymbalta at the 60 mg dose it is much more helpful for her depression and she has been having a gradual response toward improvement over a 4-week period there has been some difficulty getting Vyvanse and apparently the insurance does not cover brand-name Vyvanse anymore hopefully the generic will now be covered now that it is available.  Finds Vyvanse very helpful for focus and follow-through.    Review of Systems   Psychiatric/Behavioral: Negative.         Objective   Physical Exam  Psychiatric:         Attention and Perception: Attention and perception normal.         Mood and Affect: Mood and affect normal.         Speech: Speech normal.         Behavior: Behavior normal. Behavior is cooperative.         Thought Content: Thought content normal.         Cognition and Memory: Cognition and memory normal.         Judgment: Judgment normal.         Assessment/Plan   Problem List Items Addressed This Visit             ICD-10-CM    Attention deficit hyperactivity disorder (ADHD) F90.9     Continue Vyvanse 50 mg daily hopefully the generic will be available and her insurance would cover this.  If not we will have to find an alternative including possibly Adderall Exar if necessary.         Moderate major depression (CMS/HCC) F32.1     Continue duloxetine 60 mg daily to prevent symptom recurrence.  Patient is on an upward improvement trajectory with the duloxetine therefore we will continue the current dose to achieve maximal improvement.          Other Visit Diagnoses         Codes    Attention deficit disorder (ADD) without hyperactivity     F98.8    Relevant Medications    lisdexamfetamine (Vyvanse) 50 mg capsule

## 2023-11-13 NOTE — ASSESSMENT & PLAN NOTE
Continue Vyvanse 50 mg daily hopefully the generic will be available and her insurance would cover this.  If not we will have to find an alternative including possibly Adderall Exar if necessary.

## 2023-11-19 DIAGNOSIS — G47.00 INSOMNIA, UNSPECIFIED TYPE: ICD-10-CM

## 2023-11-20 RX ORDER — TRAZODONE HYDROCHLORIDE 50 MG/1
50 TABLET ORAL NIGHTLY PRN
Qty: 90 TABLET | Refills: 1 | Status: SHIPPED | OUTPATIENT
Start: 2023-11-20 | End: 2024-05-29 | Stop reason: SINTOL

## 2023-12-30 DIAGNOSIS — F33.41 RECURRENT MAJOR DEPRESSIVE DISORDER, IN PARTIAL REMISSION (CMS-HCC): ICD-10-CM

## 2024-01-02 RX ORDER — VENLAFAXINE HYDROCHLORIDE 37.5 MG/1
37.5 CAPSULE, EXTENDED RELEASE ORAL DAILY
Qty: 90 CAPSULE | Refills: 0 | Status: SHIPPED | OUTPATIENT
Start: 2024-01-02 | End: 2024-02-06

## 2024-01-09 ENCOUNTER — PATIENT MESSAGE (OUTPATIENT)
Dept: BEHAVIORAL HEALTH | Facility: CLINIC | Age: 49
End: 2024-01-09
Payer: COMMERCIAL

## 2024-01-09 DIAGNOSIS — F33.41 RECURRENT MAJOR DEPRESSIVE DISORDER, IN PARTIAL REMISSION (CMS-HCC): ICD-10-CM

## 2024-01-09 DIAGNOSIS — F98.8 ATTENTION DEFICIT DISORDER (ADD) WITHOUT HYPERACTIVITY: ICD-10-CM

## 2024-01-09 RX ORDER — DEXTROAMPHETAMINE SACCHARATE, AMPHETAMINE ASPARTATE MONOHYDRATE, DEXTROAMPHETAMINE SULFATE AND AMPHETAMINE SULFATE 5; 5; 5; 5 MG/1; MG/1; MG/1; MG/1
20 CAPSULE, EXTENDED RELEASE ORAL DAILY
Qty: 30 CAPSULE | Refills: 0 | Status: SHIPPED | OUTPATIENT
Start: 2024-01-09 | End: 2024-02-29 | Stop reason: WASHOUT

## 2024-01-09 RX ORDER — DULOXETIN HYDROCHLORIDE 60 MG/1
60 CAPSULE, DELAYED RELEASE ORAL DAILY
Qty: 30 CAPSULE | Refills: 11 | Status: SHIPPED | OUTPATIENT
Start: 2024-01-09 | End: 2025-01-08

## 2024-01-12 ENCOUNTER — LAB (OUTPATIENT)
Dept: LAB | Facility: LAB | Age: 49
End: 2024-01-12
Payer: COMMERCIAL

## 2024-01-12 ENCOUNTER — OFFICE VISIT (OUTPATIENT)
Dept: PRIMARY CARE | Facility: CLINIC | Age: 49
End: 2024-01-12
Payer: COMMERCIAL

## 2024-01-12 VITALS
TEMPERATURE: 97.7 F | WEIGHT: 288 LBS | DIASTOLIC BLOOD PRESSURE: 86 MMHG | SYSTOLIC BLOOD PRESSURE: 124 MMHG | BODY MASS INDEX: 42.53 KG/M2

## 2024-01-12 DIAGNOSIS — R10.13 EPIGASTRIC PAIN: Primary | ICD-10-CM

## 2024-01-12 DIAGNOSIS — R10.13 EPIGASTRIC PAIN: ICD-10-CM

## 2024-01-12 PROBLEM — I48.20 CHRONIC ATRIAL FIBRILLATION, UNSPECIFIED (MULTI): Status: RESOLVED | Noted: 2023-11-06 | Resolved: 2024-01-12

## 2024-01-12 LAB
ALBUMIN SERPL BCP-MCNC: 4.2 G/DL (ref 3.4–5)
ALP SERPL-CCNC: 99 U/L (ref 33–110)
ALT SERPL W P-5'-P-CCNC: 99 U/L (ref 7–45)
ANION GAP SERPL CALC-SCNC: 12 MMOL/L (ref 10–20)
AST SERPL W P-5'-P-CCNC: 40 U/L (ref 9–39)
BASOPHILS # BLD AUTO: 0.08 X10*3/UL (ref 0–0.1)
BASOPHILS NFR BLD AUTO: 1.1 %
BILIRUB SERPL-MCNC: 1.1 MG/DL (ref 0–1.2)
BUN SERPL-MCNC: 15 MG/DL (ref 6–23)
CALCIUM SERPL-MCNC: 9.5 MG/DL (ref 8.6–10.3)
CHLORIDE SERPL-SCNC: 103 MMOL/L (ref 98–107)
CO2 SERPL-SCNC: 28 MMOL/L (ref 21–32)
CREAT SERPL-MCNC: 0.52 MG/DL (ref 0.5–1.05)
EGFRCR SERPLBLD CKD-EPI 2021: >90 ML/MIN/1.73M*2
EOSINOPHIL # BLD AUTO: 0.26 X10*3/UL (ref 0–0.7)
EOSINOPHIL NFR BLD AUTO: 3.6 %
ERYTHROCYTE [DISTWIDTH] IN BLOOD BY AUTOMATED COUNT: 12.8 % (ref 11.5–14.5)
GLUCOSE SERPL-MCNC: 97 MG/DL (ref 74–99)
HCT VFR BLD AUTO: 43.8 % (ref 36–46)
HGB BLD-MCNC: 14.4 G/DL (ref 12–16)
IMM GRANULOCYTES # BLD AUTO: 0.02 X10*3/UL (ref 0–0.7)
IMM GRANULOCYTES NFR BLD AUTO: 0.3 % (ref 0–0.9)
LIPASE SERPL-CCNC: 7 U/L (ref 9–82)
LYMPHOCYTES # BLD AUTO: 1.87 X10*3/UL (ref 1.2–4.8)
LYMPHOCYTES NFR BLD AUTO: 25.7 %
MCH RBC QN AUTO: 29.6 PG (ref 26–34)
MCHC RBC AUTO-ENTMCNC: 32.9 G/DL (ref 32–36)
MCV RBC AUTO: 90 FL (ref 80–100)
MONOCYTES # BLD AUTO: 0.44 X10*3/UL (ref 0.1–1)
MONOCYTES NFR BLD AUTO: 6.1 %
NEUTROPHILS # BLD AUTO: 4.6 X10*3/UL (ref 1.2–7.7)
NEUTROPHILS NFR BLD AUTO: 63.2 %
NRBC BLD-RTO: 0 /100 WBCS (ref 0–0)
PLATELET # BLD AUTO: 340 X10*3/UL (ref 150–450)
POTASSIUM SERPL-SCNC: 4.7 MMOL/L (ref 3.5–5.3)
PROT SERPL-MCNC: 6.9 G/DL (ref 6.4–8.2)
RBC # BLD AUTO: 4.86 X10*6/UL (ref 4–5.2)
SODIUM SERPL-SCNC: 138 MMOL/L (ref 136–145)
WBC # BLD AUTO: 7.3 X10*3/UL (ref 4.4–11.3)

## 2024-01-12 PROCEDURE — 3008F BODY MASS INDEX DOCD: CPT | Performed by: INTERNAL MEDICINE

## 2024-01-12 PROCEDURE — 3074F SYST BP LT 130 MM HG: CPT | Performed by: INTERNAL MEDICINE

## 2024-01-12 PROCEDURE — 3079F DIAST BP 80-89 MM HG: CPT | Performed by: INTERNAL MEDICINE

## 2024-01-12 PROCEDURE — 85025 COMPLETE CBC W/AUTO DIFF WBC: CPT

## 2024-01-12 PROCEDURE — 99214 OFFICE O/P EST MOD 30 MIN: CPT | Performed by: INTERNAL MEDICINE

## 2024-01-12 PROCEDURE — 36415 COLL VENOUS BLD VENIPUNCTURE: CPT

## 2024-01-12 PROCEDURE — 83690 ASSAY OF LIPASE: CPT

## 2024-01-12 PROCEDURE — 80053 COMPREHEN METABOLIC PANEL: CPT

## 2024-01-12 PROCEDURE — 1036F TOBACCO NON-USER: CPT | Performed by: INTERNAL MEDICINE

## 2024-01-12 ASSESSMENT — ENCOUNTER SYMPTOMS
SYNCOPE: 1
HEADACHES: 0
RHINORRHEA: 0
FEVER: 0
PND: 0
SPUTUM PRODUCTION: 0
SHORTNESS OF BREATH: 1
SORE THROAT: 0
HEMOPTYSIS: 0
CLAUDICATION: 0
LEG PAIN: 0
WHEEZING: 0
NECK PAIN: 0
SWOLLEN GLANDS: 0
VOMITING: 1
ABDOMINAL PAIN: 1
ORTHOPNEA: 0

## 2024-01-12 NOTE — PROGRESS NOTES
Subjective   Patient ID: Gonzalez Jason is a 48 y.o. female who presents for pressure, d/c appetite, N/V, sharp pain below sternum x 3 d.    Shortness of Breath  This is a new problem. The current episode started in the past 7 days. The problem occurs intermittently. The problem has been waxing and waning. The average episode lasts 10 minutes. Associated symptoms include abdominal pain, chest pain, syncope and vomiting. Pertinent negatives include no claudication, coryza, ear pain, fever, headaches, hemoptysis, leg pain, leg swelling, neck pain, orthopnea, PND, rash, rhinorrhea, sore throat, sputum production, swollen glands or wheezing. The symptoms are aggravated by nothing and emotional upset. Nothing and emotional upset aggravates the symptoms.    2 days ago with fairly abrupt onset 7 AM of epigastric pain.  Shortly after eating breakfast and having coffee.  Pain was intense and constant.  Associated nausea.  No chest pain shortness of breath.  Vomited once.  Taken to ED.  In the ED had normal vital signs and normal EKG.  Left and went to second ER due to wait times.  Had additional EKG that was normal.  Again, left AMA without evaluation.  Pain resolved significantly at about 5 PM.  Lingering pain continued and is gradually resolved since.  Today essentially pain-free.  No history of similar.  Bowels working well.  Is on PPI twice a day.  In April 2023 had EGD, colonoscopy in October 23.  Is status post remote gastric bypass  Review of Systems   Constitutional:  Negative for fever.   HENT:  Negative for ear pain, rhinorrhea and sore throat.    Respiratory:  Positive for shortness of breath. Negative for hemoptysis, sputum production and wheezing.    Cardiovascular:  Positive for chest pain and syncope. Negative for orthopnea, claudication, leg swelling and PND.   Gastrointestinal:  Positive for abdominal pain and vomiting.   Musculoskeletal:  Negative for neck pain.   Skin:  Negative for rash.   Neurological:   Negative for headaches.   All systems reviewed and negative except as per history of present illness  Objective   /86   Temp 36.5 °C (97.7 °F)   Wt 131 kg (288 lb)   BMI 42.53 kg/m²     Physical Exam  Alert and oriented x 3.  No acute distress.  Clear lungs bilaterally.  Heart regular.  Abdomen with good bowel sounds.  Soft.  No hepatosplenomegaly.  No masses.    EKG;  Daquan Uribe MD - 01/10/2024   IMPRESSION:  Sinus rhythm  No old ekg available for comparison  Electronically Signed On 01- 14:37:36 EST by Daquan Uribe     Assessment/Plan   Problem List Items Addressed This Visit    None  Visit Diagnoses         Codes    Epigastric pain    -  Primary R10.13    Relevant Orders    Comprehensive Metabolic Panel    Lipase    CBC and Auto Differential    US abdomen          Epigastric pain.  Resolved.  Certainly suspicious for gallbladder disease.  Check labs.  Continue PPI.  Ultrasound.  Asked patient to return to the emergency department immediately any return of symptoms.

## 2024-01-13 ENCOUNTER — ANCILLARY PROCEDURE (OUTPATIENT)
Dept: RADIOLOGY | Facility: CLINIC | Age: 49
End: 2024-01-13
Payer: COMMERCIAL

## 2024-01-13 DIAGNOSIS — R10.13 EPIGASTRIC PAIN: ICD-10-CM

## 2024-01-13 PROCEDURE — 76700 US EXAM ABDOM COMPLETE: CPT

## 2024-01-13 PROCEDURE — 76700 US EXAM ABDOM COMPLETE: CPT | Performed by: RADIOLOGY

## 2024-01-18 DIAGNOSIS — R10.13 EPIGASTRIC PAIN: Primary | ICD-10-CM

## 2024-01-30 ENCOUNTER — DOCUMENTATION (OUTPATIENT)
Dept: UROLOGY | Facility: CLINIC | Age: 49
End: 2024-01-30
Payer: COMMERCIAL

## 2024-01-30 NOTE — RESEARCH NOTES
Patient participating in research study as per specific study details below:   IRB#: [id number]  Time Point: Consent   Name of Study: EMPOWER Study  Visit Type: Enrollment

## 2024-02-04 DIAGNOSIS — R74.8 ELEVATED LIVER ENZYMES: Primary | ICD-10-CM

## 2024-02-06 ENCOUNTER — APPOINTMENT (OUTPATIENT)
Dept: RADIOLOGY | Facility: HOSPITAL | Age: 49
End: 2024-02-06
Payer: COMMERCIAL

## 2024-02-06 ENCOUNTER — HOSPITAL ENCOUNTER (OUTPATIENT)
Facility: HOSPITAL | Age: 49
Setting detail: OBSERVATION
Discharge: HOME | End: 2024-02-08
Attending: EMERGENCY MEDICINE | Admitting: INTERNAL MEDICINE
Payer: COMMERCIAL

## 2024-02-06 ENCOUNTER — APPOINTMENT (OUTPATIENT)
Dept: CARDIOLOGY | Facility: HOSPITAL | Age: 49
End: 2024-02-06
Payer: COMMERCIAL

## 2024-02-06 DIAGNOSIS — R10.11 RIGHT UPPER QUADRANT ABDOMINAL PAIN: Primary | ICD-10-CM

## 2024-02-06 DIAGNOSIS — K80.20 CALCULUS OF GALLBLADDER WITHOUT CHOLECYSTITIS WITHOUT OBSTRUCTION: ICD-10-CM

## 2024-02-06 LAB
ALBUMIN SERPL BCP-MCNC: 3.7 G/DL (ref 3.4–5)
ALP SERPL-CCNC: 62 U/L (ref 33–110)
ALT SERPL W P-5'-P-CCNC: 31 U/L (ref 7–45)
ANION GAP SERPL CALC-SCNC: 9 MMOL/L (ref 10–20)
APPEARANCE UR: CLEAR
AST SERPL W P-5'-P-CCNC: 24 U/L (ref 9–39)
BASOPHILS # BLD AUTO: 0.06 X10*3/UL (ref 0–0.1)
BASOPHILS NFR BLD AUTO: 1.5 %
BILIRUB SERPL-MCNC: 0.9 MG/DL (ref 0–1.2)
BILIRUB UR STRIP.AUTO-MCNC: NEGATIVE MG/DL
BUN SERPL-MCNC: 14 MG/DL (ref 6–23)
CALCIUM SERPL-MCNC: 9.2 MG/DL (ref 8.6–10.3)
CHLORIDE SERPL-SCNC: 103 MMOL/L (ref 98–107)
CO2 SERPL-SCNC: 29 MMOL/L (ref 21–32)
COLOR UR: YELLOW
CREAT SERPL-MCNC: 0.62 MG/DL (ref 0.5–1.05)
EGFRCR SERPLBLD CKD-EPI 2021: >90 ML/MIN/1.73M*2
EOSINOPHIL # BLD AUTO: 0.11 X10*3/UL (ref 0–0.7)
EOSINOPHIL NFR BLD AUTO: 2.7 %
ERYTHROCYTE [DISTWIDTH] IN BLOOD BY AUTOMATED COUNT: 12.5 % (ref 11.5–14.5)
GLUCOSE SERPL-MCNC: 78 MG/DL (ref 74–99)
GLUCOSE UR STRIP.AUTO-MCNC: NEGATIVE MG/DL
HCT VFR BLD AUTO: 42.6 % (ref 36–46)
HGB BLD-MCNC: 13.7 G/DL (ref 12–16)
IMM GRANULOCYTES # BLD AUTO: 0.01 X10*3/UL (ref 0–0.7)
IMM GRANULOCYTES NFR BLD AUTO: 0.2 % (ref 0–0.9)
KETONES UR STRIP.AUTO-MCNC: NEGATIVE MG/DL
LACTATE SERPL-SCNC: 1.7 MMOL/L (ref 0.4–2)
LEUKOCYTE ESTERASE UR QL STRIP.AUTO: ABNORMAL
LIPASE SERPL-CCNC: 9 U/L (ref 9–82)
LYMPHOCYTES # BLD AUTO: 1.35 X10*3/UL (ref 1.2–4.8)
LYMPHOCYTES NFR BLD AUTO: 33.3 %
MCH RBC QN AUTO: 29.5 PG (ref 26–34)
MCHC RBC AUTO-ENTMCNC: 32.2 G/DL (ref 32–36)
MCV RBC AUTO: 92 FL (ref 80–100)
MONOCYTES # BLD AUTO: 0.31 X10*3/UL (ref 0.1–1)
MONOCYTES NFR BLD AUTO: 7.7 %
MUCOUS THREADS #/AREA URNS AUTO: NORMAL /LPF
NEUTROPHILS # BLD AUTO: 2.21 X10*3/UL (ref 1.2–7.7)
NEUTROPHILS NFR BLD AUTO: 54.6 %
NITRITE UR QL STRIP.AUTO: NEGATIVE
NRBC BLD-RTO: 0 /100 WBCS (ref 0–0)
PH UR STRIP.AUTO: 6 [PH]
PLATELET # BLD AUTO: 260 X10*3/UL (ref 150–450)
POTASSIUM SERPL-SCNC: 4.3 MMOL/L (ref 3.5–5.3)
PROT SERPL-MCNC: 6.3 G/DL (ref 6.4–8.2)
PROT UR STRIP.AUTO-MCNC: NEGATIVE MG/DL
RBC # BLD AUTO: 4.65 X10*6/UL (ref 4–5.2)
RBC # UR STRIP.AUTO: NEGATIVE /UL
RBC #/AREA URNS AUTO: NORMAL /HPF
SODIUM SERPL-SCNC: 137 MMOL/L (ref 136–145)
SP GR UR STRIP.AUTO: 1.01
UROBILINOGEN UR STRIP.AUTO-MCNC: <2 MG/DL
WBC # BLD AUTO: 4.1 X10*3/UL (ref 4.4–11.3)
WBC #/AREA URNS AUTO: NORMAL /HPF

## 2024-02-06 PROCEDURE — 84075 ASSAY ALKALINE PHOSPHATASE: CPT

## 2024-02-06 PROCEDURE — 83690 ASSAY OF LIPASE: CPT

## 2024-02-06 PROCEDURE — 2500000004 HC RX 250 GENERAL PHARMACY W/ HCPCS (ALT 636 FOR OP/ED)

## 2024-02-06 PROCEDURE — G0378 HOSPITAL OBSERVATION PER HR: HCPCS

## 2024-02-06 PROCEDURE — 74177 CT ABD & PELVIS W/CONTRAST: CPT | Performed by: RADIOLOGY

## 2024-02-06 PROCEDURE — 99222 1ST HOSP IP/OBS MODERATE 55: CPT | Performed by: NURSE PRACTITIONER

## 2024-02-06 PROCEDURE — C9113 INJ PANTOPRAZOLE SODIUM, VIA: HCPCS

## 2024-02-06 PROCEDURE — 2550000001 HC RX 255 CONTRASTS: Performed by: EMERGENCY MEDICINE

## 2024-02-06 PROCEDURE — 36415 COLL VENOUS BLD VENIPUNCTURE: CPT

## 2024-02-06 PROCEDURE — 87086 URINE CULTURE/COLONY COUNT: CPT | Mod: AHULAB

## 2024-02-06 PROCEDURE — 76705 ECHO EXAM OF ABDOMEN: CPT

## 2024-02-06 PROCEDURE — 83605 ASSAY OF LACTIC ACID: CPT

## 2024-02-06 PROCEDURE — 93005 ELECTROCARDIOGRAM TRACING: CPT

## 2024-02-06 PROCEDURE — 76705 ECHO EXAM OF ABDOMEN: CPT | Performed by: STUDENT IN AN ORGANIZED HEALTH CARE EDUCATION/TRAINING PROGRAM

## 2024-02-06 PROCEDURE — 85025 COMPLETE CBC W/AUTO DIFF WBC: CPT

## 2024-02-06 PROCEDURE — 99285 EMERGENCY DEPT VISIT HI MDM: CPT | Mod: 25 | Performed by: EMERGENCY MEDICINE

## 2024-02-06 PROCEDURE — 2500000005 HC RX 250 GENERAL PHARMACY W/O HCPCS

## 2024-02-06 PROCEDURE — 74177 CT ABD & PELVIS W/CONTRAST: CPT

## 2024-02-06 PROCEDURE — 81001 URINALYSIS AUTO W/SCOPE: CPT

## 2024-02-06 PROCEDURE — 2500000004 HC RX 250 GENERAL PHARMACY W/ HCPCS (ALT 636 FOR OP/ED): Performed by: NURSE PRACTITIONER

## 2024-02-06 PROCEDURE — 2550000001 HC RX 255 CONTRASTS

## 2024-02-06 RX ORDER — CHOLECALCIFEROL (VITAMIN D3) 25 MCG
2000 TABLET ORAL DAILY
COMMUNITY

## 2024-02-06 RX ORDER — KETOROLAC TROMETHAMINE 30 MG/ML
15 INJECTION, SOLUTION INTRAMUSCULAR; INTRAVENOUS EVERY 6 HOURS PRN
Status: DISCONTINUED | OUTPATIENT
Start: 2024-02-06 | End: 2024-02-08 | Stop reason: HOSPADM

## 2024-02-06 RX ORDER — TRAZODONE HYDROCHLORIDE 50 MG/1
50 TABLET ORAL NIGHTLY PRN
Status: DISCONTINUED | OUTPATIENT
Start: 2024-02-06 | End: 2024-02-08 | Stop reason: HOSPADM

## 2024-02-06 RX ORDER — SODIUM CHLORIDE, SODIUM LACTATE, POTASSIUM CHLORIDE, CALCIUM CHLORIDE 600; 310; 30; 20 MG/100ML; MG/100ML; MG/100ML; MG/100ML
75 INJECTION, SOLUTION INTRAVENOUS CONTINUOUS
Status: DISCONTINUED | OUTPATIENT
Start: 2024-02-07 | End: 2024-02-08 | Stop reason: HOSPADM

## 2024-02-06 RX ORDER — ACETAMINOPHEN 160 MG/5ML
650 SOLUTION ORAL EVERY 4 HOURS PRN
Status: DISCONTINUED | OUTPATIENT
Start: 2024-02-06 | End: 2024-02-08 | Stop reason: HOSPADM

## 2024-02-06 RX ORDER — KETOROLAC TROMETHAMINE 30 MG/ML
15 INJECTION, SOLUTION INTRAMUSCULAR; INTRAVENOUS ONCE
Status: COMPLETED | OUTPATIENT
Start: 2024-02-06 | End: 2024-02-06

## 2024-02-06 RX ORDER — ACETAMINOPHEN 650 MG/1
650 SUPPOSITORY RECTAL EVERY 4 HOURS PRN
Status: DISCONTINUED | OUTPATIENT
Start: 2024-02-06 | End: 2024-02-08 | Stop reason: HOSPADM

## 2024-02-06 RX ORDER — SINCALIDE 5 UG/5ML
2.4 INJECTION, POWDER, LYOPHILIZED, FOR SOLUTION INTRAVENOUS ONCE
Status: DISCONTINUED | OUTPATIENT
Start: 2024-02-07 | End: 2024-02-07 | Stop reason: CLARIF

## 2024-02-06 RX ORDER — ONDANSETRON HYDROCHLORIDE 2 MG/ML
4 INJECTION, SOLUTION INTRAVENOUS ONCE
Status: COMPLETED | OUTPATIENT
Start: 2024-02-06 | End: 2024-02-06

## 2024-02-06 RX ORDER — PANTOPRAZOLE SODIUM 40 MG/1
40 TABLET, DELAYED RELEASE ORAL 2 TIMES DAILY
Status: DISCONTINUED | OUTPATIENT
Start: 2024-02-06 | End: 2024-02-08 | Stop reason: HOSPADM

## 2024-02-06 RX ORDER — ACETAMINOPHEN 325 MG/1
650 TABLET ORAL EVERY 4 HOURS PRN
Status: DISCONTINUED | OUTPATIENT
Start: 2024-02-06 | End: 2024-02-08 | Stop reason: HOSPADM

## 2024-02-06 RX ORDER — PANTOPRAZOLE SODIUM 40 MG/10ML
40 INJECTION, POWDER, LYOPHILIZED, FOR SOLUTION INTRAVENOUS ONCE
Status: COMPLETED | OUTPATIENT
Start: 2024-02-06 | End: 2024-02-06

## 2024-02-06 RX ORDER — GUAIFENESIN/DEXTROMETHORPHAN 100-10MG/5
5 SYRUP ORAL EVERY 4 HOURS PRN
Status: DISCONTINUED | OUTPATIENT
Start: 2024-02-06 | End: 2024-02-08 | Stop reason: HOSPADM

## 2024-02-06 RX ORDER — POLYETHYLENE GLYCOL 3350 17 G/17G
17 POWDER, FOR SOLUTION ORAL DAILY PRN
Status: DISCONTINUED | OUTPATIENT
Start: 2024-02-06 | End: 2024-02-07

## 2024-02-06 RX ORDER — ALUMINUM HYDROXIDE, MAGNESIUM HYDROXIDE, AND SIMETHICONE 1200; 120; 1200 MG/30ML; MG/30ML; MG/30ML
30 SUSPENSION ORAL EVERY 6 HOURS PRN
Status: DISCONTINUED | OUTPATIENT
Start: 2024-02-06 | End: 2024-02-08 | Stop reason: HOSPADM

## 2024-02-06 RX ORDER — ACETAMINOPHEN 325 MG/1
650 TABLET ORAL ONCE
Status: COMPLETED | OUTPATIENT
Start: 2024-02-06 | End: 2024-02-06

## 2024-02-06 RX ORDER — DULOXETIN HYDROCHLORIDE 30 MG/1
60 CAPSULE, DELAYED RELEASE ORAL DAILY
Status: DISCONTINUED | OUTPATIENT
Start: 2024-02-06 | End: 2024-02-08 | Stop reason: HOSPADM

## 2024-02-06 RX ORDER — MORPHINE SULFATE 2 MG/ML
1 INJECTION, SOLUTION INTRAMUSCULAR; INTRAVENOUS EVERY 4 HOURS PRN
Status: DISCONTINUED | OUTPATIENT
Start: 2024-02-06 | End: 2024-02-08 | Stop reason: HOSPADM

## 2024-02-06 RX ORDER — DEXTROAMPHETAMINE SACCHARATE, AMPHETAMINE ASPARTATE, DEXTROAMPHETAMINE SULFATE AND AMPHETAMINE SULFATE 2.5; 2.5; 2.5; 2.5 MG/1; MG/1; MG/1; MG/1
10 TABLET ORAL
Status: DISCONTINUED | OUTPATIENT
Start: 2024-02-07 | End: 2024-02-08 | Stop reason: HOSPADM

## 2024-02-06 RX ORDER — ONDANSETRON HYDROCHLORIDE 2 MG/ML
4 INJECTION, SOLUTION INTRAVENOUS EVERY 6 HOURS PRN
Status: DISCONTINUED | OUTPATIENT
Start: 2024-02-06 | End: 2024-02-08 | Stop reason: HOSPADM

## 2024-02-06 RX ORDER — ENOXAPARIN SODIUM 100 MG/ML
40 INJECTION SUBCUTANEOUS EVERY 24 HOURS
Status: DISCONTINUED | OUTPATIENT
Start: 2024-02-07 | End: 2024-02-07

## 2024-02-06 RX ORDER — TALC
3 POWDER (GRAM) TOPICAL NIGHTLY PRN
Status: DISCONTINUED | OUTPATIENT
Start: 2024-02-06 | End: 2024-02-08 | Stop reason: HOSPADM

## 2024-02-06 RX ORDER — ONDANSETRON 4 MG/1
4 TABLET, ORALLY DISINTEGRATING ORAL EVERY 6 HOURS PRN
Status: DISCONTINUED | OUTPATIENT
Start: 2024-02-06 | End: 2024-02-08 | Stop reason: HOSPADM

## 2024-02-06 RX ADMIN — SODIUM CHLORIDE 1000 ML: 9 INJECTION, SOLUTION INTRAVENOUS at 08:59

## 2024-02-06 RX ADMIN — ACETAMINOPHEN 650 MG: 325 TABLET ORAL at 13:11

## 2024-02-06 RX ADMIN — KETOROLAC TROMETHAMINE 15 MG: 30 INJECTION, SOLUTION INTRAMUSCULAR; INTRAVENOUS at 15:40

## 2024-02-06 RX ADMIN — PANTOPRAZOLE SODIUM 40 MG: 40 TABLET, DELAYED RELEASE ORAL at 23:10

## 2024-02-06 RX ADMIN — MORPHINE SULFATE 1 MG: 2 INJECTION, SOLUTION INTRAMUSCULAR; INTRAVENOUS at 20:46

## 2024-02-06 RX ADMIN — IOHEXOL 75 ML: 350 INJECTION, SOLUTION INTRAVENOUS at 09:49

## 2024-02-06 RX ADMIN — PANTOPRAZOLE SODIUM 40 MG: 40 INJECTION, POWDER, FOR SOLUTION INTRAVENOUS at 10:35

## 2024-02-06 RX ADMIN — SODIUM CHLORIDE, POTASSIUM CHLORIDE, SODIUM LACTATE AND CALCIUM CHLORIDE 75 ML/HR: 600; 310; 30; 20 INJECTION, SOLUTION INTRAVENOUS at 23:10

## 2024-02-06 RX ADMIN — KETOROLAC TROMETHAMINE 15 MG: 30 INJECTION, SOLUTION INTRAMUSCULAR; INTRAVENOUS at 08:59

## 2024-02-06 RX ADMIN — ONDANSETRON 4 MG: 2 INJECTION INTRAMUSCULAR; INTRAVENOUS at 20:50

## 2024-02-06 RX ADMIN — DIPHENHYDRAMINE HYDROCHLORIDE AND LIDOCAINE HYDROCHLORIDE AND ALUMINUM HYDROXIDE AND MAGNESIUM HYDRO 10 ML: KIT at 11:56

## 2024-02-06 RX ADMIN — ONDANSETRON 4 MG: 2 INJECTION INTRAMUSCULAR; INTRAVENOUS at 08:59

## 2024-02-06 ASSESSMENT — PAIN SCALES - GENERAL
PAINLEVEL_OUTOF10: 5 - MODERATE PAIN
PAINLEVEL_OUTOF10: 9
PAINLEVEL_OUTOF10: 8
PAINLEVEL_OUTOF10: 9
PAINLEVEL_OUTOF10: 7
PAINLEVEL_OUTOF10: 3

## 2024-02-06 ASSESSMENT — COLUMBIA-SUICIDE SEVERITY RATING SCALE - C-SSRS
2. HAVE YOU ACTUALLY HAD ANY THOUGHTS OF KILLING YOURSELF?: NO
6. HAVE YOU EVER DONE ANYTHING, STARTED TO DO ANYTHING, OR PREPARED TO DO ANYTHING TO END YOUR LIFE?: NO
1. IN THE PAST MONTH, HAVE YOU WISHED YOU WERE DEAD OR WISHED YOU COULD GO TO SLEEP AND NOT WAKE UP?: NO

## 2024-02-06 ASSESSMENT — ACTIVITIES OF DAILY LIVING (ADL): LACK_OF_TRANSPORTATION: NO

## 2024-02-06 ASSESSMENT — PAIN - FUNCTIONAL ASSESSMENT
PAIN_FUNCTIONAL_ASSESSMENT: 0-10
PAIN_FUNCTIONAL_ASSESSMENT: 0-10

## 2024-02-06 ASSESSMENT — PAIN DESCRIPTION - LOCATION
LOCATION: HEAD
LOCATION: ABDOMEN

## 2024-02-06 ASSESSMENT — PAIN DESCRIPTION - PROGRESSION: CLINICAL_PROGRESSION: GRADUALLY IMPROVING

## 2024-02-06 ASSESSMENT — PAIN DESCRIPTION - DESCRIPTORS: DESCRIPTORS: DULL

## 2024-02-06 ASSESSMENT — PAIN DESCRIPTION - ORIENTATION: ORIENTATION: RIGHT

## 2024-02-06 NOTE — PROGRESS NOTES
Transitional Care Coordination Progress Note:  Plan per Medical/Surgical team: treatment of abd pain, gallstones with IV tordadol, zofran, IV fluids, NPO, HIDDA scan pending, surgery consult   Status: Observation  Payor source: [employer]  Discharge disposition: Home with    Potential Barriers: ?surgery  ADOD: 2/8/2024  FRANK Ferarra RN, BSN Transitional Care Coordinator ED# [phone]      02/06/24 1545   Discharge Planning   Living Arrangements Spouse/significant other   Support Systems Spouse/significant other   Assistance Needed GI work up   Type of Residence Private residence   Number of Stairs to Enter Residence 2   Number of Stairs Within Residence 14   Home or Post Acute Services None   Patient expects to be discharged to: Home with    Does the patient need discharge transport arranged? Yes   RoundTrip coordination needed? Yes   Has discharge transport been arranged? No   Financial Resource Strain   How hard is it for you to pay for the very basics like food, housing, medical care, and heating? Not hard   Housing Stability   In the last 12 months, was there a time when you were not able to pay the mortgage or rent on time? N   In the last 12 months, how many places have you lived? 1   In the last 12 months, was there a time when you did not have a steady place to sleep or slept in a shelter (including now)? N   Transportation Needs   In the past 12 months, has lack of transportation kept you from medical appointments or from getting medications? no   In the past 12 months, has lack of transportation kept you from meetings, work, or from getting things needed for daily living? No

## 2024-02-06 NOTE — H&P
HPI:  This is a 48 y.o. female with PMH of anxiety/depression, sciatica, pyelonephritis, obesity status post gastric sleeve surgery 2021, HTN, GERD, hepatic hemangioma, ADD, presenting due to a ~3-month history of epigastric/RUQ/flank pain which has been worsening in frequency and severity. She describes pain as dull and occasionally shooting, often associated with nausea and occasional SOB due to severity, worse after eating solids.   Pt also reports worsening heartburn x 2 weeks, has been complaint with PPI BID, last EGD in 2023 - small hiatal hernia. Denies NSAIDs, melena.   She denies personal hx of CAD or VTE/PE.   Workup with stable labs including LFTs, unremarkable UA, stable EKG, CT abdomen/pelvis with contrast showed a known liver hemangioma, gallbladder ultrasound with cholelithiasis, mild liver steatosis.  Patient given IVF, analgesics/antiemetics, admitted for further care.  Seen by general surgery, and for HIDA and possible cholecystectomy tomorrow.    Full ROS done and negative except as stated above.      Surgical History:  Adenoidectomy (12/28/2015); Tympanostomy tube placement (12/28/2015); Inner ear surgery (12/28/2015); Hysterectomy (12/28/2015); Lithotripsy (12/28/2015); Hysteroscopy (12/28/2015); and Back surgery (12/28/2015).    Social History:  Remote tobacco use, occasional alcohol, no illicits     Family History:  Mother - CAD/CABG age 60s; brother - MI age 50s     Current Outpatient Medications on File Prior to Encounter   Medication Sig    amphetamine-dextroamphetamine XR (Adderall XR) 20 mg 24 hr capsule Take 1 capsule (20 mg) by mouth once daily. Do not crush or chew.    beta carotene (vitamin A) 3,000 mcg (10,000 unit) capsule Take 1 capsule (10,000 Units) by mouth once daily.    calcium citrate-vitamin D3 250 mg-5 mcg (200 unit) tablet Take 1 tablet by mouth once daily.    cholecalciferol (Vitamin D3) 25 MCG (1000 UT) tablet Take 2 tablets (2,000 Units) by mouth once daily.     "DULoxetine (Cymbalta) 60 mg DR capsule Take 1 capsule (60 mg) by mouth once daily. Do not crush or chew.    fexofenadine (Allegra) 180 mg tablet Take 1 tablet (180 mg) by mouth once daily as needed (allergies).    lisdexamfetamine (Vyvanse) 50 mg capsule Take 1 capsule (50 mg) by mouth once daily in the morning. (Patient not taking: Reported on 2/6/2024)    lisdexamfetamine (Vyvanse) 50 mg capsule Take 1 capsule (50 mg) by mouth once daily in the morning. Do not start before November 3, 2023. (Patient not taking: Reported on 2/6/2024)    lisdexamfetamine (Vyvanse) 50 mg capsule Take 1 capsule (50 mg) by mouth once daily in the morning. Do not start before December 3, 2023. (Patient not taking: Reported on 2/6/2024)    lisdexamfetamine (Vyvanse) 50 mg capsule Take 1 capsule (50 mg) by mouth once daily in the morning. (Patient not taking: Reported on 2/6/2024)    ondansetron (Zofran) 4 mg tablet Take 1 tablet (4 mg) by mouth every 8 hours if needed for vomiting or nausea.    pantoprazole (ProtoNix) 40 mg EC tablet Take 1 tablet (40 mg) by mouth 2 times a day.    traZODone (Desyrel) 50 mg tablet TAKE 1 TABLET (50 MG) BY MOUTH AS NEEDED AT BEDTIME FOR SLEEP.    vitamin B complex tablet Take 1 tablet by mouth once daily.    zinc gluconate 50 mg tablet Take 1 tablet (50 mg) by mouth once daily.    [DISCONTINUED] loratadine (Claritin) 10 mg tablet Take by mouth.    [DISCONTINUED] predniSONE (Deltasone) 20 mg tablet TAKE 3 TABS BY MOUTH X 6 DAYS, 2 TABS X 3 DAYS, 1 TAB X 3 DAYS. DO NOT TAKE OTHER NSAIDS    [DISCONTINUED] venlafaxine XR (Effexor-XR) 37.5 mg 24 hr capsule TAKE 1 CAPSULE (37.5 MG) BY MOUTH ONCE DAILY. DO NOT CRUSH OR CHEW. (Patient not taking: Reported on 2/6/2024)      Vitals (Last 24 Hours):  Heart Rate:  [70-88]   Temperature:  [36.3 °C (97.3 °F)]   Respirations:  [16-18]   BP: (101-163)/()   Height:  [177 cm (5' 9.69\")]   Weight:  [120 kg (265 lb)]   Pulse Ox:  [95 %-100 %]      PHYSICAL " EXAM:  Constitutional: NAD, alert and cooperative  Eyes: no icterus  ENMT: mucous membranes moist, no lesions  Head/Neck: supple  Respiratory/Thorax: CTA bilaterally, non-labored breathing, no cough, on RA  Cardiovascular: RRR, no murmurs heard  Gastrointestinal: ND/S, TTP RUQ   : no Larsen, no SP/flank discomfort  Musculoskeletal: no joint swelling, ROM intact  Extremities: no edema  Neurological: non-focal  Skin: warm and dry  Psych: calm, stable mood     MEDS:  [START ON 2/7/2024] amphetamine-dextroamphetamine, 10 mg, oral, BID after breakfast and lunch  DULoxetine, 60 mg, oral, Daily  pantoprazole, 40 mg, oral, BID    [START ON 2/7/2024] lactated Ringer's, 75 mL/hr    PRN medications: acetaminophen **OR** acetaminophen **OR** acetaminophen, alum-mag hydroxide-simeth, dextromethorphan-guaifenesin, melatonin, ondansetron ODT **OR** ondansetron, polyethylene glycol, traZODone      I have reviewed all imaging reports and labs pertinent to this visit    ASSESSMENT/PLAN:    Epigastric/RUQ/flank pain  Cholelithiasis, suspected early cholecystitis   -HIDA in am, NPO @ [facility] for possible cholecystectomy   -pain control (pt prefers to avoid opioids), anti-emetics, CLD today, start IVF when NPO   -no fevers/leukocytosis, deferring abx at this time     GERD  -continue PPI     Other comorbidities as above   -continue meds as ordered and adjust based on clinical course      -Lovenox held in anticipation of OR, PPI, bowel regimen in place     Discharge planning  -no PT needs     Will d/w the hospitalist team     Martha Juarez, APRN-CNP     Oriented - self; Oriented - place; Oriented - time

## 2024-02-06 NOTE — PROGRESS NOTES
Home with       02/06/24 3052   Current Planned Discharge Disposition   Current Planned Discharge Disposition Home

## 2024-02-06 NOTE — PROGRESS NOTES
Pharmacy Medication History Review    Gonzalez Jason is a 48 y.o. female admitted for No Principal Problem: There is no principal problem currently on the Problem List. Please update the Problem List and refresh.. Pharmacy reviewed the patient's [id number] medications and allergies for accuracy.    The list below reflectives the updated PTA list. Please review each medication in order reconciliation for additional clarification and justification.  Prior to Admission medications    Medication Sig Start Date End Date Taking? Authorizing Provider                                                                                                                                                        The list below reflectives the updated allergy list. Please review each documented allergy for additional clarification and justification.  Allergies  Reviewed by Asmita Cole RN on 2/6/2024        Severity Reactions Comments    Codeine High Anaphylaxis, Rash, Shortness of breath     Peanut High Anaphylaxis, Hives, Itching, Rash Congestion    Shellfish Derived High Itching, Anaphylaxis Stuffy nose    Lisinopril Medium Rash Significant cough    Sulfa (sulfonamide Antibiotics) Medium Rash, Other Congestion    Sulfasalazine Medium Rash Congestion            Below are additional concerns with the patient's PTA list.  Prior to Admission Medications   Prescriptions Last Dose Informant   DULoxetine (Cymbalta) 60 mg DR capsule 2/6/2024    Sig: Take 1 capsule (60 mg) by mouth once daily. Do not crush or chew.   amphetamine-dextroamphetamine XR (Adderall XR) 20 mg 24 hr capsule 2/5/2024    Sig: Take 1 capsule (20 mg) by mouth once daily. Do not crush or chew.   beta carotene (vitamin A) 3,000 mcg (10,000 unit) capsule 2/5/2024    Sig: Take 1 capsule (10,000 Units) by mouth once daily.   calcium citrate-vitamin D3 250 mg-5 mcg (200 unit) tablet 2/5/2024    Sig: Take 1 tablet by mouth once daily.   cholecalciferol (Vitamin D3) 25  MCG (1000 UT) tablet 2/5/2024    Sig: Take 2 tablets (2,000 Units) by mouth once daily.   fexofenadine (Allegra) 180 mg tablet     Sig: Take 1 tablet (180 mg) by mouth once daily as needed (allergies).                                               ondansetron (Zofran) 4 mg tablet 2/5/2024    Sig: Take 1 tablet (4 mg) by mouth every 8 hours if needed for vomiting or nausea.   pantoprazole (ProtoNix) 40 mg EC tablet 2/6/2024    Sig: Take 1 tablet (40 mg) by mouth 2 times a day.   traZODone (Desyrel) 50 mg tablet     Sig: TAKE 1 TABLET (50 MG) BY MOUTH AS NEEDED AT BEDTIME FOR SLEEP.              vitamin B complex tablet 2/5/2024    Sig: Take 1 tablet by mouth once daily.   zinc gluconate 50 mg tablet 2/5/2024    Sig: Take 1 tablet (50 mg) by mouth once daily.      Facility-Administered Medications: None      Per patient.     Funmilayo Jay, PHILLhT

## 2024-02-06 NOTE — ED TRIAGE NOTES
"C/O ABD PAIN/LOWER BACK PAIN, ONSET \"OVER A MONTH AND A HALF\" PT STATES \"THEY THINK IT MIGHT BE MY GALLBLADDER\" DENIES DYSURIA/HEMATURIA/HEMATOCHEZIA/HEMATEMESIS, PT WAS SEEN AT ANOTHER ER YESTERDAY AND STATES \"ALL THEY DID WAS GIVE ME PAIN MEDS AND SENT ME HOME AFTER 12 HOURS, THE DR SAID A PREVIOUS US SAID IT SHOWED THICKENING AND I NEEDED TO GO HOME\"   "

## 2024-02-06 NOTE — CONSULTS
Reason For Consult  RUQ pain    History Of Present Illness  Gonzalez Jason is a 48 y.o. female presenting with RUQ pain off and on for 3 months. She reports pain is worsening and over the past 2-3 weeks has been more constant with associated nausea/vomiting. She reports pain gets worse around 30min to 1 hour after eating meals. She denies any alleviating factors. Reports pain radiates to her back. She was being worked up out patient for Gallbladder pathology and hd a HIDA scan scheduled on 2/21.     In ED CT didn't reveal any concerning findings. RUQ US showed stones.      Past Medical History  She has a past medical history of Personal history of other diseases of the circulatory system (08/03/2015).    Surgical History  She has a past surgical history that includes Adenoidectomy (12/28/2015); Tympanostomy tube placement (12/28/2015); Inner ear surgery (12/28/2015); Other surgical history (12/28/2015); Hysterectomy (12/28/2015); Lithotripsy (12/28/2015); Hysteroscopy (12/28/2015); and Back surgery (12/28/2015).     Social History  She reports that she quit smoking about 23 years ago. Her smoking use included cigarettes. She has never used smokeless tobacco. No history on file for alcohol use and drug use.    Family History  Family History   Problem Relation Name Age of Onset    Heart disease Mother      Diabetes Mother      Heart disease Father      Diabetes Brother      Diabetes Mother's Sister          Allergies  Codeine, Peanut, Shellfish derived, Lisinopril, Sulfa (sulfonamide antibiotics), and Sulfasalazine    Review of Systems  A full 10 point ROS was completed. Nothing positive other than what was mentioned in the HPI.      Physical Exam  PE:  Constitutional: A&Ox3, calm and cooperative    Head/Neck: Neck supple, no JVD    Cardiovascular: RRR    Respiratory/Thorax: Non labored breathing on RA    Gastrointestinal: Abdomen nondistended, soft. Very tender in RUQ.    Genitourinary: Voiding independently  "    Musculoskeletal: ROM intact, no joint swelling, normal strength    Extremities: GONGORA, No peripheral edema    Neurological: No focal deficits     Psychological: Appropriate mood and behavior    Skin: Warm and dry.       Last Recorded Vitals  Blood pressure 102/67, pulse 78, temperature 36.3 °C (97.3 °F), resp. rate 18, height 1.77 m (5' 9.69\"), weight 120 kg (265 lb), SpO2 98 %.    Relevant Results  Results for orders placed or performed during the hospital encounter of 02/06/24 (from the past 24 hour(s))   Lactate   Result Value Ref Range    Lactate 1.7 0.4 - 2.0 mmol/L   Lipase   Result Value Ref Range    Lipase 9 9 - 82 U/L   Comprehensive Metabolic Panel   Result Value Ref Range    Glucose 78 74 - 99 mg/dL    Sodium 137 136 - 145 mmol/L    Potassium 4.3 3.5 - 5.3 mmol/L    Chloride 103 98 - 107 mmol/L    Bicarbonate 29 21 - 32 mmol/L    Anion Gap 9 (L) 10 - 20 mmol/L    Urea Nitrogen 14 6 - 23 mg/dL    Creatinine 0.62 0.50 - 1.05 mg/dL    eGFR >90 >60 mL/min/1.73m*2    Calcium 9.2 8.6 - 10.3 mg/dL    Albumin 3.7 3.4 - 5.0 g/dL    Alkaline Phosphatase 62 33 - 110 U/L    Total Protein 6.3 (L) 6.4 - 8.2 g/dL    AST 24 9 - 39 U/L    Bilirubin, Total 0.9 0.0 - 1.2 mg/dL    ALT 31 7 - 45 U/L   CBC and Auto Differential   Result Value Ref Range    WBC 4.1 (L) 4.4 - 11.3 x10*3/uL    nRBC 0.0 0.0 - 0.0 /100 WBCs    RBC 4.65 4.00 - 5.20 x10*6/uL    Hemoglobin 13.7 12.0 - 16.0 g/dL    Hematocrit 42.6 36.0 - 46.0 %    MCV 92 80 - 100 fL    MCH 29.5 26.0 - 34.0 pg    MCHC 32.2 32.0 - 36.0 g/dL    RDW 12.5 11.5 - 14.5 %    Platelets 260 150 - 450 x10*3/uL    Neutrophils % 54.6 40.0 - 80.0 %    Immature Granulocytes %, Automated 0.2 0.0 - 0.9 %    Lymphocytes % 33.3 13.0 - 44.0 %    Monocytes % 7.7 2.0 - 10.0 %    Eosinophils % 2.7 0.0 - 6.0 %    Basophils % 1.5 0.0 - 2.0 %    Neutrophils Absolute 2.21 1.20 - 7.70 x10*3/uL    Immature Granulocytes Absolute, Automated 0.01 0.00 - 0.70 x10*3/uL    Lymphocytes Absolute 1.35 " 1.20 - 4.80 x10*3/uL    Monocytes Absolute 0.31 0.10 - 1.00 x10*3/uL    Eosinophils Absolute 0.11 0.00 - 0.70 x10*3/uL    Basophils Absolute 0.06 0.00 - 0.10 x10*3/uL   Urinalysis with Reflex Culture and Microscopic   Result Value Ref Range    Color, Urine Yellow Straw, Yellow    Appearance, Urine Clear Clear    Specific Gravity, Urine 1.006 1.005 - 1.035    pH, Urine 6.0 5.0, 5.5, 6.0, 6.5, 7.0, 7.5, 8.0    Protein, Urine NEGATIVE NEGATIVE mg/dL    Glucose, Urine NEGATIVE NEGATIVE mg/dL    Blood, Urine NEGATIVE NEGATIVE    Ketones, Urine NEGATIVE NEGATIVE mg/dL    Bilirubin, Urine NEGATIVE NEGATIVE    Urobilinogen, Urine <2.0 <2.0 mg/dL    Nitrite, Urine NEGATIVE NEGATIVE    Leukocyte Esterase, Urine TRACE (A) NEGATIVE   Microscopic Only, Urine   Result Value Ref Range    WBC, Urine 1-5 1-5, NONE /HPF    RBC, Urine NONE NONE, 1-2, 3-5 /HPF    Mucus, Urine 1+ Reference range not established. /LPF   ECG 12 lead   Result Value Ref Range    Ventricular Rate 74 BPM    Atrial Rate 74 BPM    MA Interval 202 ms    QRS Duration 84 ms    QT Interval 380 ms    QTC Calculation(Bazett) 421 ms    P Axis 68 degrees    R Axis 63 degrees    T Axis 60 degrees    QRS Count 12 beats    Q Onset 223 ms    P Onset 122 ms    P Offset 186 ms    T Offset 413 ms    QTC Fredericia 407 ms     US gallbladder   Final Result   Hepatomegaly and mild steatosis.   7.2 cm left hepatic lobe hemangioma.   Cholelithiasis without evidence of cholecystitis.        Signed by: Carroll Cox 2/6/2024 1:30 PM   Dictation workstation:   [id number]      CT abdomen pelvis w IV contrast   Final Result   No acute abnormality.        Hepatic hemangioma.        Signed by: Mona Neff 2/6/2024 10:43 AM   Dictation workstation:   [id number]      NM hepatobiliary w cholecystokinin    (Results Pending)         Assessment/Plan     Plan: Cholelithiasis  - Okay for CLD tonight  - NPO MN  - Admit to obs  - PRN pain control  - PRN antemetic  - HIDA ordered for  tomorrow morning    Dispo: Discussed with Dr. Fontenot. Will obtain a HIDA for this patient tomorrow to determine need for Laparoscopic Cholecystectomy.    I spent 60 minutes in the professional and overall care of this patient.      Margarito Helm PA-C      Pt seen she has had RUQ pain that is worse with palpation for months - it has gotten worse and worse recently.  Now she has peristaltic pain when she drinks clear liquids.  She is having BM;'s.  Had a sleeve 3 years ago and lost 126lbs.  She has no fever.  Before 2 months ago had not had pain like this    Gen - Pt lying in bed, holding RUQ  Abd - multiple well healed lx scars + Petit's sign, otherwise nontender and nondistended  Extr - no edema  === 02/06/24 ===    US GALLBLADDER    - Impression -  Hepatomegaly and mild steatosis.  7.2 cm left hepatic lobe hemangioma.  Cholelithiasis without evidence of cholecystitis.    Signed by: Carroll Cox 2/6/2024 1:30 PM  Dictation workstation:   [id number]    === 02/06/24 ===    CT ABDOMEN PELVIS W IV CONTRAST    - Impression -  No acute abnormality.    Hepatic hemangioma.    Signed by: Mona Neff 2/6/2024 10:43 AM  Dictation workstation:   [id number]    Lab Results   Component Value Date    ALT 31 02/06/2024    AST 24 02/06/2024    ALKPHOS 62 02/06/2024    BILITOT 0.9 02/06/2024         Impression:  [place] early cholecystitis - normal LFT's  Plan   Clears ok tonight   NPO after midnight  HIDA in the AM  Plan for Lx jono after that assuming it is positive.

## 2024-02-06 NOTE — ED PROVIDER NOTES
HPI   Chief Complaint   Patient presents with    Abdominal Pain       HPI  HISTORY OF PRESENT ILLNESS:  48 y.o. female presenting to the ED with complaint of upper abdominal pain.  She reports a 3-month history of intermittent right upper quadrant and right flank pain.  Does seem to worsen with meals and food.  She has also had nausea and intermittent vomiting, as well as loose stools.  Denies hematemesis.  No bloody stools, denies melena or hematochezia.  She has been seen at [facility] and by her PCP and GI, states that she previously had an ultrasound that showed gallbladder wall thickening, and has a HIDA scan planned.  She also takes pantoprazole for heartburn and epigastric discomfort.  Patient states that her symptoms worsened yesterday, she went to the [facility] ED, states that she had labs done but no imaging, and was told symptoms may be related to her gallbladder and advised outpatient follow-up with PCP and surgeon.  She states that her pain did not improve and is persistent this morning.  She states that her heartburn is worse and has been in the past, burning epigastric pain.  She states that her pain in the right upper quadrant and right flank is also worsened, she describes it as a constant ache that is intermittently sharp.  She denies any urinary symptoms, no dysuria, hematuria, urgency or frequency.  Denies fevers or chills.  Denies chest pain or shortness of breath.  No dizziness lightheadedness, no syncope.  She denies chance of pregnancy, history of prior hysterectomy.  She has also previously had a gastric sleeve surgery about 3 years ago.  No other complaints or symptoms voiced.    PMH: HTN, GERD, ADHD, dysmetabolic syndrome, obesity, anxiety, depression, migraines, hx kidney stones  Surgical history: gastric sleeve 3 years ago, hysterectomy, renal lithotripsy (2015)  Family history: noncontributory  Social history: non smoker, no ETOH, no illicit substances    12  point review of systems was performed and is negative unless otherwise specified in HPI.        No data recorded                Patient History   Past Medical History:   Diagnosis Date    Personal history of other diseases of the circulatory system 2015    History of varicose veins     Past Surgical History:   Procedure Laterality Date    ADENOIDECTOMY  2015    Adenoidectomy    BACK SURGERY  2015    Back Surgery    HYSTERECTOMY  2015    Hysterectomy    HYSTEROSCOPY  2015    Hysteroscopy With Endometrial Ablation    INNER EAR SURGERY  2015    Inner Ear Surgery    LITHOTRIPSY  2015    Renal Lithotripsy    OTHER SURGICAL HISTORY  2015    Uterine Surgery    TYMPANOSTOMY TUBE PLACEMENT  2015    Ear Pressure Equalization Tube, Insertion, Bilaterally     Family History   Problem Relation Name Age of Onset    Heart disease Mother      Diabetes Mother      Heart disease Father      Diabetes Brother      Diabetes Mother's Sister       Social History     Tobacco Use    Smoking status: Former     Types: Cigarettes     Quit date:      Years since quittin.1    Smokeless tobacco: Never   Substance Use Topics    Alcohol use: Not on file    Drug use: Not on file       Physical Exam   ED Triage Vitals [24 0836]   Temperature Heart Rate Respirations BP   36.3 °C (97.3 °F) 88 18 (!) 163/107      Pulse Ox Temp src Heart Rate Source Patient Position   100 % -- -- --      BP Location FiO2 (%)     -- --       Physical Exam  Constitutional:       General: She is not in acute distress.     Appearance: She is not ill-appearing, toxic-appearing or diaphoretic.   HENT:      Head: Normocephalic and atraumatic.      Nose: No congestion.      Mouth/Throat:      Mouth: Mucous membranes are moist.   Eyes:      General: No scleral icterus.     Extraocular Movements: Extraocular movements intact.      Pupils: Pupils are equal, round, and reactive to light.   Cardiovascular:      Rate  and Rhythm: Normal rate and regular rhythm.      Pulses: Normal pulses.   Pulmonary:      Effort: Pulmonary effort is normal. No respiratory distress.      Breath sounds: Normal breath sounds.   Abdominal:      General: There is no distension.      Palpations: Abdomen is soft. There is no mass or pulsatile mass.      Tenderness: There is abdominal tenderness in the right upper quadrant and epigastric area. There is right CVA tenderness. There is no left CVA tenderness, guarding or rebound. Negative signs include Petit's sign and McBurney's sign.   Musculoskeletal:         General: Normal range of motion.      Cervical back: Normal range of motion and neck supple. No rigidity.      Right lower leg: No edema.      Left lower leg: No edema.   Skin:     General: Skin is warm and dry.      Capillary Refill: Capillary refill takes less than 2 seconds.   Neurological:      General: No focal deficit present.      Mental Status: She is alert and oriented to person, place, and time.      Gait: Gait normal.   Psychiatric:         Mood and Affect: Mood normal.         Behavior: Behavior normal.         Judgment: Judgment normal.       ED Course & MDM   ED Course as of 02/06/24 1552   Tue Feb 06, 2024   0917 09:06 12 lead EKG interpreted by myself and my ED attending reveals sinus rhythm with a rate of 74 beats per minute.  Normal Axis.  There are no ST elevations or depressions. T wave inversions in V1. No acute ischemic changes identified.  [EH]      ED Course User Index  [EH] Izabella Medel PA-C         Diagnoses as of 02/06/24 1552   Right upper quadrant abdominal pain   Calculus of gallbladder without cholecystitis without obstruction       Medical Decision Making  ED course / MDM     Summary:  Patient presented with right upper quadrant and epigastric pain, ongoing for 3 months intermittently, worsened in the past few days.  Vital signs are stable, hypertensive in triage at 163/107, improved to 119/83 on repeat vitals,  no tachycardia, afebrile, on 100% on room air.  Patient is nontoxic appearing, alert and oriented, ambulates unassisted.  On exam, there is some tenderness in the right upper quadrant and epigastric area, no distention, no guarding or rigidity.  Lungs clear to auscultation, heart regular rate and rhythm.  IV established, labs drawn.  EKG is nonischemic.  Labs are reassuring, no leukocytosis, normal hemoglobin, no electrolyte abnormalities, normal kidney and liver function.  Negative lactate and lipase.  Urinalysis noninfected.  CT scan shows no acute abnormality, shows a hepatic hemangioma.  Right upper quadrant ultrasound shows hepatomegaly, mild steatosis, hemangioma, as well as cholelithiasis without evidence of cholecystitis.  Patient declined opioid analgesics. Given IV fluids, Zofran, Toradol, Tylenol, Protonix, and Maalox, symptoms did not improve. Patient case discussed with ED attending Dr. Martinez, who also saw and evaluated the patient.  Discussed with surgery team, who will evaluate the patient in the ED, surgical consult order placed.  Surgical team concern for biliary pathology, recommending admission for HIDA scan.  Patient is in agreement with this plan.  Accepted for observation.    Impression:  1. See diagnosis     Disposition: Admission    Patient seen and discussed with Dr. Martinez    This note has been transcribed using voice recognition and may contain grammatical errors, misplaced words, incorrect words, incorrect phrases or other errors.   Procedure  Procedures     Izabella Medel PA-C  02/06/24 [id number]

## 2024-02-06 NOTE — PROGRESS NOTES
02/06/24 1543   [facility] Disability Status   Are you deaf or do you have serious difficulty hearing? N   Are you blind or do you have serious difficulty seeing, even when wearing glasses? N   Because of a physical, mental, or emotional condition, do you have serious difficulty concentrating, remembering, or making decisions? (5 years old or older) N   Do you have serious difficulty walking or climbing stairs? N   Do you have serious difficulty dressing or bathing? N   Because of a physical, mental, or emotional condition, do you have serious difficulty doing errands alone such as visiting the doctor? N

## 2024-02-07 ENCOUNTER — APPOINTMENT (OUTPATIENT)
Dept: RADIOLOGY | Facility: HOSPITAL | Age: 49
End: 2024-02-07
Payer: COMMERCIAL

## 2024-02-07 ENCOUNTER — ANESTHESIA (OUTPATIENT)
Dept: OPERATING ROOM | Facility: HOSPITAL | Age: 49
End: 2024-02-07
Payer: COMMERCIAL

## 2024-02-07 ENCOUNTER — ANESTHESIA EVENT (OUTPATIENT)
Dept: OPERATING ROOM | Facility: HOSPITAL | Age: 49
End: 2024-02-07
Payer: COMMERCIAL

## 2024-02-07 PROBLEM — K80.20 CALCULUS OF GALLBLADDER WITHOUT CHOLECYSTITIS WITHOUT OBSTRUCTION: Status: ACTIVE | Noted: 2024-02-06

## 2024-02-07 LAB
ALBUMIN SERPL BCP-MCNC: 3.5 G/DL (ref 3.4–5)
ALP SERPL-CCNC: 49 U/L (ref 33–110)
ALT SERPL W P-5'-P-CCNC: 22 U/L (ref 7–45)
ANION GAP SERPL CALC-SCNC: 7 MMOL/L (ref 10–20)
AST SERPL W P-5'-P-CCNC: 21 U/L (ref 9–39)
ATRIAL RATE: 74 BPM
BILIRUB SERPL-MCNC: 0.9 MG/DL (ref 0–1.2)
BUN SERPL-MCNC: 10 MG/DL (ref 6–23)
CALCIUM SERPL-MCNC: 8.9 MG/DL (ref 8.6–10.3)
CHLORIDE SERPL-SCNC: 104 MMOL/L (ref 98–107)
CO2 SERPL-SCNC: 31 MMOL/L (ref 21–32)
CREAT SERPL-MCNC: 0.61 MG/DL (ref 0.5–1.05)
EGFRCR SERPLBLD CKD-EPI 2021: >90 ML/MIN/1.73M*2
ERYTHROCYTE [DISTWIDTH] IN BLOOD BY AUTOMATED COUNT: 12.4 % (ref 11.5–14.5)
GLUCOSE SERPL-MCNC: 92 MG/DL (ref 74–99)
HCT VFR BLD AUTO: 40.2 % (ref 36–46)
HGB BLD-MCNC: 13.2 G/DL (ref 12–16)
MCH RBC QN AUTO: 29.9 PG (ref 26–34)
MCHC RBC AUTO-ENTMCNC: 32.8 G/DL (ref 32–36)
MCV RBC AUTO: 91 FL (ref 80–100)
NRBC BLD-RTO: 0 /100 WBCS (ref 0–0)
P AXIS: 68 DEGREES
P OFFSET: 186 MS
P ONSET: 122 MS
PLATELET # BLD AUTO: 231 X10*3/UL (ref 150–450)
POTASSIUM SERPL-SCNC: 4.3 MMOL/L (ref 3.5–5.3)
PR INTERVAL: 202 MS
PROT SERPL-MCNC: 5.7 G/DL (ref 6.4–8.2)
Q ONSET: 223 MS
QRS COUNT: 12 BEATS
QRS DURATION: 84 MS
QT INTERVAL: 380 MS
QTC CALCULATION(BAZETT): 421 MS
QTC FREDERICIA: 407 MS
R AXIS: 63 DEGREES
RBC # BLD AUTO: 4.41 X10*6/UL (ref 4–5.2)
SODIUM SERPL-SCNC: 138 MMOL/L (ref 136–145)
T AXIS: 60 DEGREES
T OFFSET: 413 MS
VENTRICULAR RATE: 74 BPM
WBC # BLD AUTO: 4.8 X10*3/UL (ref 4.4–11.3)

## 2024-02-07 PROCEDURE — 99233 SBSQ HOSP IP/OBS HIGH 50: CPT | Performed by: NURSE PRACTITIONER

## 2024-02-07 PROCEDURE — 7100000002 HC RECOVERY ROOM TIME - EACH INCREMENTAL 1 MINUTE: Performed by: SURGERY

## 2024-02-07 PROCEDURE — A47562 PR LAP,CHOLECYSTECTOMY: Performed by: ANESTHESIOLOGIST ASSISTANT

## 2024-02-07 PROCEDURE — G0378 HOSPITAL OBSERVATION PER HR: HCPCS

## 2024-02-07 PROCEDURE — 2500000001 HC RX 250 WO HCPCS SELF ADMINISTERED DRUGS (ALT 637 FOR MEDICARE OP): Performed by: NURSE PRACTITIONER

## 2024-02-07 PROCEDURE — 2500000001 HC RX 250 WO HCPCS SELF ADMINISTERED DRUGS (ALT 637 FOR MEDICARE OP): Performed by: INTERNAL MEDICINE

## 2024-02-07 PROCEDURE — 2500000004 HC RX 250 GENERAL PHARMACY W/ HCPCS (ALT 636 FOR OP/ED): Performed by: INTERNAL MEDICINE

## 2024-02-07 PROCEDURE — 78227 HEPATOBIL SYST IMAGE W/DRUG: CPT

## 2024-02-07 PROCEDURE — 36415 COLL VENOUS BLD VENIPUNCTURE: CPT | Performed by: NURSE PRACTITIONER

## 2024-02-07 PROCEDURE — 47562 LAPAROSCOPIC CHOLECYSTECTOMY: CPT | Performed by: SURGERY

## 2024-02-07 PROCEDURE — 2500000005 HC RX 250 GENERAL PHARMACY W/O HCPCS: Performed by: SURGERY

## 2024-02-07 PROCEDURE — 2500000005 HC RX 250 GENERAL PHARMACY W/O HCPCS: Performed by: ANESTHESIOLOGY

## 2024-02-07 PROCEDURE — 88304 TISSUE EXAM BY PATHOLOGIST: CPT | Mod: TC,SUR,AHULAB | Performed by: SURGERY

## 2024-02-07 PROCEDURE — 2500000005 HC RX 250 GENERAL PHARMACY W/O HCPCS: Performed by: ANESTHESIOLOGIST ASSISTANT

## 2024-02-07 PROCEDURE — 2500000001 HC RX 250 WO HCPCS SELF ADMINISTERED DRUGS (ALT 637 FOR MEDICARE OP): Performed by: ANESTHESIOLOGY

## 2024-02-07 PROCEDURE — 2500000004 HC RX 250 GENERAL PHARMACY W/ HCPCS (ALT 636 FOR OP/ED): Performed by: ANESTHESIOLOGIST ASSISTANT

## 2024-02-07 PROCEDURE — 2500000004 HC RX 250 GENERAL PHARMACY W/ HCPCS (ALT 636 FOR OP/ED): Performed by: NURSE PRACTITIONER

## 2024-02-07 PROCEDURE — 2500000004 HC RX 250 GENERAL PHARMACY W/ HCPCS (ALT 636 FOR OP/ED): Performed by: ANESTHESIOLOGY

## 2024-02-07 PROCEDURE — 3700000002 HC GENERAL ANESTHESIA TIME - EACH INCREMENTAL 1 MINUTE: Performed by: SURGERY

## 2024-02-07 PROCEDURE — 7100000001 HC RECOVERY ROOM TIME - INITIAL BASE CHARGE: Performed by: SURGERY

## 2024-02-07 PROCEDURE — 2500000004 HC RX 250 GENERAL PHARMACY W/ HCPCS (ALT 636 FOR OP/ED): Performed by: SURGERY

## 2024-02-07 PROCEDURE — 2500000004 HC RX 250 GENERAL PHARMACY W/ HCPCS (ALT 636 FOR OP/ED)

## 2024-02-07 PROCEDURE — 3600000008 HC OR TIME - EACH INCREMENTAL 1 MINUTE - PROCEDURE LEVEL THREE: Performed by: SURGERY

## 2024-02-07 PROCEDURE — C1889 IMPLANT/INSERT DEVICE, NOC: HCPCS | Performed by: SURGERY

## 2024-02-07 PROCEDURE — 99024 POSTOP FOLLOW-UP VISIT: CPT | Performed by: SURGERY

## 2024-02-07 PROCEDURE — 88304 TISSUE EXAM BY PATHOLOGIST: CPT | Performed by: STUDENT IN AN ORGANIZED HEALTH CARE EDUCATION/TRAINING PROGRAM

## 2024-02-07 PROCEDURE — 2720000007 HC OR 272 NO HCPCS: Performed by: SURGERY

## 2024-02-07 PROCEDURE — 85027 COMPLETE CBC AUTOMATED: CPT | Performed by: NURSE PRACTITIONER

## 2024-02-07 PROCEDURE — A47562 PR LAP,CHOLECYSTECTOMY: Performed by: ANESTHESIOLOGY

## 2024-02-07 PROCEDURE — 84075 ASSAY ALKALINE PHOSPHATASE: CPT | Performed by: NURSE PRACTITIONER

## 2024-02-07 PROCEDURE — 2780000003 HC OR 278 NO HCPCS: Performed by: SURGERY

## 2024-02-07 PROCEDURE — A4217 STERILE WATER/SALINE, 500 ML: HCPCS | Performed by: SURGERY

## 2024-02-07 PROCEDURE — 3600000003 HC OR TIME - INITIAL BASE CHARGE - PROCEDURE LEVEL THREE: Performed by: SURGERY

## 2024-02-07 PROCEDURE — A9537 TC99M MEBROFENIN: HCPCS

## 2024-02-07 PROCEDURE — 3430000001 HC RX 343 DIAGNOSTIC RADIOPHARMACEUTICALS

## 2024-02-07 PROCEDURE — 3700000001 HC GENERAL ANESTHESIA TIME - INITIAL BASE CHARGE: Performed by: SURGERY

## 2024-02-07 RX ORDER — SODIUM CHLORIDE, SODIUM LACTATE, POTASSIUM CHLORIDE, CALCIUM CHLORIDE 600; 310; 30; 20 MG/100ML; MG/100ML; MG/100ML; MG/100ML
100 INJECTION, SOLUTION INTRAVENOUS CONTINUOUS
Status: DISCONTINUED | OUTPATIENT
Start: 2024-02-07 | End: 2024-02-07 | Stop reason: HOSPADM

## 2024-02-07 RX ORDER — DEXAMETHASONE SODIUM PHOSPHATE 4 MG/ML
INJECTION, SOLUTION INTRA-ARTICULAR; INTRALESIONAL; INTRAMUSCULAR; INTRAVENOUS; SOFT TISSUE AS NEEDED
Status: DISCONTINUED | OUTPATIENT
Start: 2024-02-07 | End: 2024-02-07

## 2024-02-07 RX ORDER — ENOXAPARIN SODIUM 100 MG/ML
40 INJECTION SUBCUTANEOUS EVERY 24 HOURS
Status: DISCONTINUED | OUTPATIENT
Start: 2024-02-08 | End: 2024-02-08 | Stop reason: HOSPADM

## 2024-02-07 RX ORDER — POLYETHYLENE GLYCOL 3350 17 G/17G
17 POWDER, FOR SOLUTION ORAL DAILY
Status: DISCONTINUED | OUTPATIENT
Start: 2024-02-07 | End: 2024-02-08 | Stop reason: HOSPADM

## 2024-02-07 RX ORDER — CEFAZOLIN 1 G/1
INJECTION, POWDER, FOR SOLUTION INTRAVENOUS AS NEEDED
Status: DISCONTINUED | OUTPATIENT
Start: 2024-02-07 | End: 2024-02-07

## 2024-02-07 RX ORDER — MIDAZOLAM HYDROCHLORIDE 1 MG/ML
INJECTION INTRAMUSCULAR; INTRAVENOUS AS NEEDED
Status: DISCONTINUED | OUTPATIENT
Start: 2024-02-07 | End: 2024-02-07

## 2024-02-07 RX ORDER — SODIUM CHLORIDE 0.9 G/100ML
IRRIGANT IRRIGATION AS NEEDED
Status: DISCONTINUED | OUTPATIENT
Start: 2024-02-07 | End: 2024-02-07 | Stop reason: HOSPADM

## 2024-02-07 RX ORDER — KIT FOR THE PREPARATION OF TECHNETIUM TC 99M MEBROFENIN 45 MG/10ML
6 INJECTION, POWDER, LYOPHILIZED, FOR SOLUTION INTRAVENOUS
Status: COMPLETED | OUTPATIENT
Start: 2024-02-07 | End: 2024-02-07

## 2024-02-07 RX ORDER — ONDANSETRON HYDROCHLORIDE 2 MG/ML
4 INJECTION, SOLUTION INTRAVENOUS ONCE AS NEEDED
Status: COMPLETED | OUTPATIENT
Start: 2024-02-07 | End: 2024-02-07

## 2024-02-07 RX ORDER — BISACODYL 5 MG
5 TABLET, DELAYED RELEASE (ENTERIC COATED) ORAL ONCE
Status: COMPLETED | OUTPATIENT
Start: 2024-02-07 | End: 2024-02-07

## 2024-02-07 RX ORDER — PROPOFOL 10 MG/ML
INJECTION, EMULSION INTRAVENOUS AS NEEDED
Status: DISCONTINUED | OUTPATIENT
Start: 2024-02-07 | End: 2024-02-07

## 2024-02-07 RX ORDER — ROCURONIUM BROMIDE 10 MG/ML
INJECTION, SOLUTION INTRAVENOUS AS NEEDED
Status: DISCONTINUED | OUTPATIENT
Start: 2024-02-07 | End: 2024-02-07

## 2024-02-07 RX ORDER — LIDOCAINE HYDROCHLORIDE 20 MG/ML
INJECTION, SOLUTION INFILTRATION; PERINEURAL AS NEEDED
Status: DISCONTINUED | OUTPATIENT
Start: 2024-02-07 | End: 2024-02-07

## 2024-02-07 RX ORDER — BUPIVACAINE HYDROCHLORIDE 5 MG/ML
INJECTION, SOLUTION PERINEURAL AS NEEDED
Status: DISCONTINUED | OUTPATIENT
Start: 2024-02-07 | End: 2024-02-07 | Stop reason: HOSPADM

## 2024-02-07 RX ORDER — ACETAMINOPHEN 325 MG/1
650 TABLET ORAL EVERY 4 HOURS PRN
Status: DISCONTINUED | OUTPATIENT
Start: 2024-02-07 | End: 2024-02-07 | Stop reason: HOSPADM

## 2024-02-07 RX ORDER — OXYCODONE HYDROCHLORIDE 5 MG/1
5 TABLET ORAL EVERY 4 HOURS PRN
Status: DISCONTINUED | OUTPATIENT
Start: 2024-02-07 | End: 2024-02-07 | Stop reason: ALTCHOICE

## 2024-02-07 RX ORDER — DEXMEDETOMIDINE HYDROCHLORIDE 100 UG/ML
INJECTION, SOLUTION INTRAVENOUS AS NEEDED
Status: DISCONTINUED | OUTPATIENT
Start: 2024-02-07 | End: 2024-02-07

## 2024-02-07 RX ORDER — FENTANYL CITRATE 50 UG/ML
INJECTION, SOLUTION INTRAMUSCULAR; INTRAVENOUS AS NEEDED
Status: DISCONTINUED | OUTPATIENT
Start: 2024-02-07 | End: 2024-02-07

## 2024-02-07 RX ORDER — TRAMADOL HYDROCHLORIDE 50 MG/1
50 TABLET ORAL ONCE AS NEEDED
Status: COMPLETED | OUTPATIENT
Start: 2024-02-07 | End: 2024-02-07

## 2024-02-07 RX ORDER — ALBUTEROL SULFATE 0.83 MG/ML
2.5 SOLUTION RESPIRATORY (INHALATION) ONCE AS NEEDED
Status: DISCONTINUED | OUTPATIENT
Start: 2024-02-07 | End: 2024-02-07 | Stop reason: HOSPADM

## 2024-02-07 RX ADMIN — FENTANYL CITRATE 50 MCG: 50 INJECTION, SOLUTION INTRAMUSCULAR; INTRAVENOUS at 17:31

## 2024-02-07 RX ADMIN — MORPHINE SULFATE 1 MG: 2 INJECTION, SOLUTION INTRAMUSCULAR; INTRAVENOUS at 11:03

## 2024-02-07 RX ADMIN — GLYCOPYRROLATE 200 MCG: 0.2 INJECTION, SOLUTION INTRAMUSCULAR; INTRAVITREAL at 16:20

## 2024-02-07 RX ADMIN — SUGAMMADEX 200 MG: 100 INJECTION, SOLUTION INTRAVENOUS at 17:47

## 2024-02-07 RX ADMIN — ROCURONIUM BROMIDE 60 MG: 10 INJECTION, SOLUTION INTRAVENOUS at 16:12

## 2024-02-07 RX ADMIN — ONDANSETRON 4 MG: 2 INJECTION INTRAMUSCULAR; INTRAVENOUS at 17:47

## 2024-02-07 RX ADMIN — DEXMEDETOMIDINE HCL 6 MCG: 100 INJECTION INTRAVENOUS at 16:31

## 2024-02-07 RX ADMIN — ONDANSETRON 4 MG: 2 INJECTION INTRAMUSCULAR; INTRAVENOUS at 10:48

## 2024-02-07 RX ADMIN — ALUMINUM HYDROXIDE, MAGNESIUM HYDROXIDE, AND DIMETHICONE 30 ML: 200; 20; 200 SUSPENSION ORAL at 11:09

## 2024-02-07 RX ADMIN — DEXMEDETOMIDINE HCL 6 MCG: 100 INJECTION INTRAVENOUS at 16:52

## 2024-02-07 RX ADMIN — FENTANYL CITRATE 50 MCG: 50 INJECTION, SOLUTION INTRAMUSCULAR; INTRAVENOUS at 16:18

## 2024-02-07 RX ADMIN — HYDROMORPHONE HYDROCHLORIDE 0.5 MG: 1 INJECTION, SOLUTION INTRAMUSCULAR; INTRAVENOUS; SUBCUTANEOUS at 18:31

## 2024-02-07 RX ADMIN — BISACODYL 5 MG: 5 TABLET, COATED ORAL at 11:50

## 2024-02-07 RX ADMIN — SODIUM CHLORIDE, POTASSIUM CHLORIDE, SODIUM LACTATE AND CALCIUM CHLORIDE 100 ML/HR: 600; 310; 30; 20 INJECTION, SOLUTION INTRAVENOUS at 18:20

## 2024-02-07 RX ADMIN — Medication: at 18:45

## 2024-02-07 RX ADMIN — ONDANSETRON 4 MG: 2 INJECTION INTRAMUSCULAR; INTRAVENOUS at 18:53

## 2024-02-07 RX ADMIN — HYDROMORPHONE HYDROCHLORIDE 0.5 MG: 1 INJECTION, SOLUTION INTRAMUSCULAR; INTRAVENOUS; SUBCUTANEOUS at 18:22

## 2024-02-07 RX ADMIN — MIDAZOLAM HYDROCHLORIDE 2 MG: 1 INJECTION, SOLUTION INTRAMUSCULAR; INTRAVENOUS at 16:08

## 2024-02-07 RX ADMIN — KETOROLAC TROMETHAMINE 15 MG: 30 INJECTION, SOLUTION INTRAMUSCULAR; INTRAVENOUS at 04:22

## 2024-02-07 RX ADMIN — KIT FOR THE PREPARATION OF TECHNETIUM TC 99M MEBROFENIN 6 MILLICURIE: 45 INJECTION, POWDER, LYOPHILIZED, FOR SOLUTION INTRAVENOUS at 07:44

## 2024-02-07 RX ADMIN — POLYETHYLENE GLYCOL 3350 17 G: 17 POWDER, FOR SOLUTION ORAL at 13:18

## 2024-02-07 RX ADMIN — DEXAMETHASONE SODIUM PHOSPHATE 8 MG: 4 INJECTION, SOLUTION INTRAMUSCULAR; INTRAVENOUS at 16:20

## 2024-02-07 RX ADMIN — PANTOPRAZOLE SODIUM 40 MG: 40 TABLET, DELAYED RELEASE ORAL at 11:00

## 2024-02-07 RX ADMIN — KETOROLAC TROMETHAMINE 15 MG: 30 INJECTION, SOLUTION INTRAMUSCULAR; INTRAVENOUS at 22:11

## 2024-02-07 RX ADMIN — CEFAZOLIN 3 G: 330 INJECTION, POWDER, FOR SOLUTION INTRAMUSCULAR; INTRAVENOUS at 16:19

## 2024-02-07 RX ADMIN — PROPOFOL 200 MG: 10 INJECTION, EMULSION INTRAVENOUS at 16:12

## 2024-02-07 RX ADMIN — KETOROLAC TROMETHAMINE 15 MG: 30 INJECTION, SOLUTION INTRAMUSCULAR; INTRAVENOUS at 14:00

## 2024-02-07 RX ADMIN — Medication 2.4 MCG: at 08:52

## 2024-02-07 RX ADMIN — DULOXETINE HYDROCHLORIDE 60 MG: 30 CAPSULE, DELAYED RELEASE ORAL at 10:57

## 2024-02-07 RX ADMIN — ONDANSETRON 4 MG: 2 INJECTION INTRAMUSCULAR; INTRAVENOUS at 22:11

## 2024-02-07 RX ADMIN — TRAMADOL HYDROCHLORIDE 50 MG: 50 TABLET, COATED ORAL at 18:51

## 2024-02-07 RX ADMIN — LIDOCAINE HYDROCHLORIDE 100 MG: 20 INJECTION, SOLUTION INFILTRATION; PERINEURAL at 16:12

## 2024-02-07 SDOH — SOCIAL STABILITY: SOCIAL INSECURITY: HAVE YOU HAD THOUGHTS OF HARMING ANYONE ELSE?: NO

## 2024-02-07 SDOH — SOCIAL STABILITY: SOCIAL INSECURITY: DOES ANYONE TRY TO KEEP YOU FROM HAVING/CONTACTING OTHER FRIENDS OR DOING THINGS OUTSIDE YOUR HOME?: NO

## 2024-02-07 SDOH — SOCIAL STABILITY: SOCIAL INSECURITY: ARE THERE ANY APPARENT SIGNS OF INJURIES/BEHAVIORS THAT COULD BE RELATED TO ABUSE/NEGLECT?: NO

## 2024-02-07 SDOH — SOCIAL STABILITY: SOCIAL INSECURITY: DO YOU FEEL UNSAFE GOING BACK TO THE PLACE WHERE YOU ARE LIVING?: NO

## 2024-02-07 SDOH — SOCIAL STABILITY: SOCIAL INSECURITY: HAS ANYONE EVER THREATENED TO HURT YOUR FAMILY OR YOUR PETS?: NO

## 2024-02-07 SDOH — SOCIAL STABILITY: SOCIAL INSECURITY: ABUSE: ADULT

## 2024-02-07 SDOH — SOCIAL STABILITY: SOCIAL INSECURITY: DO YOU FEEL ANYONE HAS EXPLOITED OR TAKEN ADVANTAGE OF YOU FINANCIALLY OR OF YOUR PERSONAL PROPERTY?: NO

## 2024-02-07 SDOH — SOCIAL STABILITY: SOCIAL INSECURITY: ARE YOU OR HAVE YOU BEEN THREATENED OR ABUSED PHYSICALLY, EMOTIONALLY, OR SEXUALLY BY ANYONE?: NO

## 2024-02-07 SDOH — SOCIAL STABILITY: SOCIAL INSECURITY: WERE YOU ABLE TO COMPLETE ALL THE BEHAVIORAL HEALTH SCREENINGS?: YES

## 2024-02-07 ASSESSMENT — COGNITIVE AND FUNCTIONAL STATUS - GENERAL
DAILY ACTIVITIY SCORE: 24
PATIENT BASELINE BEDBOUND: UNABLE TO ASSESS AT THIS TIME
DAILY ACTIVITIY SCORE: 24
MOBILITY SCORE: 24
MOBILITY SCORE: 24

## 2024-02-07 ASSESSMENT — LIFESTYLE VARIABLES
HOW MANY STANDARD DRINKS CONTAINING ALCOHOL DO YOU HAVE ON A TYPICAL DAY: PATIENT DOES NOT DRINK
HOW OFTEN DO YOU HAVE A DRINK CONTAINING ALCOHOL: NEVER
AUDIT-C TOTAL SCORE: 0
HOW OFTEN DO YOU HAVE 6 OR MORE DRINKS ON ONE OCCASION: NEVER
AUDIT-C TOTAL SCORE: 0
SKIP TO QUESTIONS 9-10: 1

## 2024-02-07 ASSESSMENT — PAIN SCALES - GENERAL
PAINLEVEL_OUTOF10: 7
PAINLEVEL_OUTOF10: 5 - MODERATE PAIN
PAINLEVEL_OUTOF10: 7
PAINLEVEL_OUTOF10: 6
PAINLEVEL_OUTOF10: 2
PAINLEVEL_OUTOF10: 5 - MODERATE PAIN
PAINLEVEL_OUTOF10: 9
PAINLEVEL_OUTOF10: 6
PAINLEVEL_OUTOF10: 5 - MODERATE PAIN
PAINLEVEL_OUTOF10: 8
PAINLEVEL_OUTOF10: 5 - MODERATE PAIN
PAINLEVEL_OUTOF10: 3
PAINLEVEL_OUTOF10: 2
PAINLEVEL_OUTOF10: 3

## 2024-02-07 ASSESSMENT — PAIN - FUNCTIONAL ASSESSMENT

## 2024-02-07 ASSESSMENT — COLUMBIA-SUICIDE SEVERITY RATING SCALE - C-SSRS
1. IN THE PAST MONTH, HAVE YOU WISHED YOU WERE DEAD OR WISHED YOU COULD GO TO SLEEP AND NOT WAKE UP?: NO
2. HAVE YOU ACTUALLY HAD ANY THOUGHTS OF KILLING YOURSELF?: NO
6. HAVE YOU EVER DONE ANYTHING, STARTED TO DO ANYTHING, OR PREPARED TO DO ANYTHING TO END YOUR LIFE?: NO

## 2024-02-07 ASSESSMENT — ACTIVITIES OF DAILY LIVING (ADL)
FEEDING YOURSELF: INDEPENDENT
GROOMING: INDEPENDENT
JUDGMENT_ADEQUATE_SAFELY_COMPLETE_DAILY_ACTIVITIES: YES
ADEQUATE_TO_COMPLETE_ADL: YES
DRESSING YOURSELF: INDEPENDENT
TOILETING: INDEPENDENT
WALKS IN HOME: INDEPENDENT
HEARING - RIGHT EAR: FUNCTIONAL
BATHING: INDEPENDENT
HEARING - LEFT EAR: FUNCTIONAL
PATIENT'S MEMORY ADEQUATE TO SAFELY COMPLETE DAILY ACTIVITIES?: YES

## 2024-02-07 ASSESSMENT — PATIENT HEALTH QUESTIONNAIRE - PHQ9
SUM OF ALL RESPONSES TO PHQ9 QUESTIONS 1 & 2: 0
1. LITTLE INTEREST OR PLEASURE IN DOING THINGS: NOT AT ALL
2. FEELING DOWN, DEPRESSED OR HOPELESS: NOT AT ALL

## 2024-02-07 ASSESSMENT — PAIN DESCRIPTION - LOCATION
LOCATION: ABDOMEN

## 2024-02-07 ASSESSMENT — PAIN DESCRIPTION - PROGRESSION: CLINICAL_PROGRESSION: OTHER (COMMENT)

## 2024-02-07 ASSESSMENT — PAIN DESCRIPTION - ORIENTATION
ORIENTATION: RIGHT
ORIENTATION: RIGHT;UPPER

## 2024-02-07 ASSESSMENT — PAIN DESCRIPTION - DESCRIPTORS: DESCRIPTORS: STABBING

## 2024-02-07 NOTE — OP NOTE
Cholecystectomy Laparoscopy Operative Note     Date: 2024 - 2024  OR Location: [facility] A OR    Name: Gonzalez Jason, : 1975, Age: 48 y.o., MRN: [id number], Sex: female    Diagnosis  Pre-op Diagnosis     * Right upper quadrant abdominal pain [R10.11] Post-op Diagnosis     * Right upper quadrant abdominal pain [R10.11]     Procedures  Cholecystectomy Laparoscopy  [address] - MA LAPAROSCOPY SURG CHOLECYSTECTOMY      Surgeons      * Chilo Flanagan - Primary    Resident/Fellow/Other Assistant:  Surgeon(s) and Role:    Procedure Summary  Anesthesia: General  ASA: II  Anesthesia Staff: Anesthesiologist: Canelo Ervin MD  C-AA: VLAD Malcolm; VLDA Powers; VLAD Resendiz  Estimated Blood Loss: 10 mL  Intra-op Medications: Administrations occurring from 1630 to 1745 on 24:  * No intraprocedure medications in log *           Anesthesia Record               Intraprocedure I/O Totals       None           Specimen:   ID Type Source Tests Collected by Time   1 : GALLBLADDER AND CONTENTS Tissue GALLBLADDER CHOLECYSTECTOMY SURGICAL PATHOLOGY EXAM Chilo Flanagan MD 2024 1640        Staff:   Circulator: Susana Braden RN  Relief Circulator: Kendy Melo RN  Scrub Person: Alexia Hammer         Drains and/or Catheters: * None in log *    Tourniquet Times:         Implants:     Findings: Omental adhesions to the gallbladder consistent with chronic inflammation    Indications: Gonzalez Jason is an 48 y.o. female who is having surgery for Right upper quadrant abdominal pain [R10.11].  Right upper quadrant abdominal pain.  HIDA negative but ultrasound with gallstones and symptoms consistent with symptomatic cholelithiasis.  Moderate size hemangioma of the left liver lobe seen on imaging without significant change.    The patient was seen in the preoperative area. The risks, benefits, complications, treatment options, non-operative alternatives, expected recovery and outcomes were discussed with  the patient. The possibilities of reaction to medication, pulmonary aspiration, injury to surrounding structures, bleeding, recurrent infection, the need for additional procedures, failure to diagnose a condition, and creating a complication requiring transfusion or operation were discussed with the patient. The patient concurred with the proposed plan, giving informed consent.  The site of surgery was properly noted/marked if necessary per policy. The patient has been actively warmed in preoperative area. Preoperative antibiotics have been ordered and given within 1 hours of incision. Venous thrombosis prophylaxis have been ordered including bilateral sequential compression devices    Procedure Details: Patient was identified in the preoperative area and transported to the operating room.  They were placed supine on the or table.  General anesthesia was induced. OG, SCDs were placed.  The abdomen was prepped and draped in usual sterile fashion.  Time-out was performed confirming patient, procedure, perioperative criteria.  Supraumbilical incision was made carried down the fascia was grasped elevated and sharply incised.  Peritoneum was entered.  Hager port was placed and camera was inserted without entry injury  5 millimeter ports were placed in the subxiphoid, right upper quadrant, right anterior axillary lines under direct visualization.  Attention was turned the right upper quadrant.  Moderate sized bulge just inferior to the left lobe of the liver consistent with an angioma.  No concerning inflammation bleeding or pathology was seen.  There were adhesions from the omentum overlying the gallbladder from inflammation which were taken down sharply.  Gallbladder was exposed and elevated.  Additional omental adhesions were dissected inferiorly and through a combination sharp and blunt dissection 2 structures were identified entering the gallbladder with the liver bed posterior confirming the critical view of  safety.  This was photographed.  The cystic duct was clipped twice proximally once distally and divided.  The cystic artery was clipped twice proximally once distally and divided.  Gallbladder was then taken off the liver bed with electrocautery.  Liver bed was confirmed hemostatic.  Gallbladder was placed in an Endo-Catch bag and removed via the supraumbilical port.  Periumbilical fascia was closed with an 0 Vicryl using Dileep Bahena suture Passer.  Abdomen was completely desufflated.  The skin was closed with 4 O Monocryl subcuticular fashion followed by a Dermabond.  Prior to complete closure all counts were confirmed correct.  Patient was then extubated and transferred the PACU in stable condition.   Complications:  None; patient tolerated the procedure well.    Disposition: PACU - hemodynamically stable.  Condition: stable         Additional Details:     Attending Attestation: I was present and scrubbed for the entire procedure.    Chilo Flanagan  Phone Number: [phone]

## 2024-02-07 NOTE — PROGRESS NOTES
"Patient continues treatment of abd pain, gallstones with IV tordadol, morphine, zofran, IV fluids, NPO, HIDDA scan pending, surgery consult for \"Lx jono\" (surgery)  FRANK Frerara RN, BSN Transitional Care Coordinator ED# [phone]    "

## 2024-02-07 NOTE — ANESTHESIA PROCEDURE NOTES
Airway  Date/Time: 2/7/2024 4:15 PM  Urgency: elective      Staffing  Performed: VLAD   Authorized by: Canelo Ervin MD    Performed by: VLAD Resendiz  Patient location during procedure: OR    Indications and Patient Condition  Indications for airway management: anesthesia and airway protection  Spontaneous Ventilation: absent  Sedation level: deep  Preoxygenated: yes  Patient position: sniffing  Mask difficulty assessment: 1 - vent by mask  Planned trial extubation    Final Airway Details  Final airway type: endotracheal airway      Successful airway: ETT  Cuffed: yes   Successful intubation technique: direct laryngoscopy  Blade: Romi  Blade size: #3  ETT size (mm): 7.5  Cormack-Lehane Classification: grade IIb - view of arytenoids or posterior of glottis only  Placement verified by: chest auscultation and capnometry   Measured from: teeth  ETT to teeth (cm): 22  Number of attempts at approach: 1

## 2024-02-07 NOTE — PROGRESS NOTES
"Gonzalez Jason is a 48 y.o. female on day 0 of admission presenting with Right upper quadrant abdominal pain.    Subjective   Ongoing ruq pain, no nausea, improved with medications       Objective     Physical Exam  Constitutional:       Appearance: Normal appearance.   HENT:      Head: Normocephalic.   Cardiovascular:      Rate and Rhythm: Normal rate and regular rhythm.      Pulses: Normal pulses.   Pulmonary:      Effort: Pulmonary effort is normal.   Abdominal:      General: Bowel sounds are normal.      Palpations: Abdomen is soft.      Comments: Mild ttp ruq, goldberg sign negative   Musculoskeletal:         General: Normal range of motion.   Skin:     General: Skin is warm.   Neurological:      General: No focal deficit present.      Mental Status: She is alert.   Psychiatric:         Mood and Affect: Mood normal.         Behavior: Behavior normal.         Last Recorded Vitals  Blood pressure 115/77, pulse 72, temperature 36.4 °C (97.5 °F), temperature source Temporal, resp. rate 16, height 1.753 m (5' 9\"), weight 120 kg (265 lb), SpO2 99 %.  Intake/Output last 3 Shifts:  No intake/output data recorded.    Relevant Results                             Assessment/Plan   Principal Problem:    Right upper quadrant abdominal pain  Active Problems:    Calculus of gallbladder without cholecystitis without obstruction    HIDA scan negative for acute cholecystitis.  Ultrasound consistent with gallstones.  CT showing moderate size approximately 7 to 8 cm hepatic hemangioma.  Plan for laparoscopic cholecystectomy for presumed symptomatic cholelithiasis.  No high risk activity for marginal ulcer at this time.  Discussed should symptoms persist despite cholecystectomy may benefit from repeat upper endoscopy to rule out marginal ulcer versus evaluation and treatment of hepatic angioma         I spent 20 minutes in the professional and overall care of this patient.      Chilo Flanagan MD      "

## 2024-02-07 NOTE — ANESTHESIA PREPROCEDURE EVALUATION
Patient: Gonzalez Jason    Procedure Information       Date: 24    Procedure: Cholecystectomy Laparoscopy - 24    Location: [facility] OR    Surgeons: Christiano Low MD            Relevant Problems   Cardiovascular   (+) Atypical chest pain   (+) Essential hypertension   (+) Hemangioma of liver      Endocrine   (+) Morbid obesity (CMS/HCC)      GI   (+) GERD without esophagitis      /Renal   (+) Fatty liver   (+) Hemangioma of liver   (+) Kidney stones   (+) Pyelonephritis      Neuro/Psych   (+) Anxiety and depression   (+) Depression   (+) Generalized anxiety disorder   (+) Moderate major depression (CMS/HCC)      GI/Hepatic   (+) Calculus of gallbladder without cholecystitis without obstruction   (+) Fatty liver      Infectious Disease   (+) Oral thrush   (+) Pyelonephritis       Clinical information reviewed:    Allergies  Meds               No data recorded       Past Medical History:   Diagnosis Date    ADD (attention deficit disorder)     Anxiety     Depression     GERD (gastroesophageal reflux disease)     HTN (hypertension)     Liver hemangioma       Past Surgical History:   Procedure Laterality Date    ADENOIDECTOMY      BARIATRIC SURGERY  2021    Laparoscopic gastric sleeve    HYSTERECTOMY      HYSTEROSCOPY      INNER EAR SURGERY      LITHOTRIPSY      TYMPANOSTOMY TUBE PLACEMENT       Social History     Tobacco Use    Smoking status: Former     Types: Cigarettes     Quit date:      Years since quittin.1    Smokeless tobacco: Never      Current Outpatient Medications   Medication Instructions    amphetamine-dextroamphetamine XR (Adderall XR) 20 mg 24 hr capsule 20 mg, oral, Daily, Do not crush or chew.    beta carotene (VITAMIN A) 10,000 Units, oral, Daily    calcium citrate-vitamin D3 250 mg-5 mcg (200 unit) tablet 1 tablet, oral, Daily    cholecalciferol (VITAMIN D3) 2,000 Units, oral, Daily    DULoxetine (CYMBALTA) 60 mg, oral, Daily, Do not crush or chew.     "fexofenadine (Allegra) 180 mg tablet Take 1 tablet (180 mg) by mouth once daily as needed (allergies).    lisdexamfetamine (VYVANSE) 50 mg, oral, Every morning    lisdexamfetamine (VYVANSE) 50 mg, oral, Every morning    lisdexamfetamine (VYVANSE) 50 mg, oral, Every morning    lisdexamfetamine (VYVANSE) 50 mg, oral, Every morning    ondansetron (Zofran) 4 mg tablet Take 1 tablet (4 mg) by mouth every 8 hours if needed for vomiting or nausea.    pantoprazole (ProtoNix) 40 mg EC tablet 1 tablet, oral, 2 times daily    traZODone (DESYREL) 50 mg, oral, Nightly PRN    vitamin B complex tablet 1 tablet, oral, Daily    zinc gluconate 50 mg tablet 1 tablet, oral, Daily      Allergies   Allergen Reactions    Codeine Anaphylaxis, Rash and Shortness of breath    Peanut Anaphylaxis, Hives, Itching and Rash     Congestion    Shellfish Derived Itching and Anaphylaxis     Stuffy nose    Lisinopril Rash     Significant cough    Sulfa (Sulfonamide Antibiotics) Rash and Other     Congestion    Sulfasalazine Rash     Congestion        Chemistry    Lab Results   Component Value Date/Time     02/07/2024 0534    K 4.3 02/07/2024 0534     02/07/2024 0534    CO2 31 02/07/2024 0534    BUN 10 02/07/2024 0534    CREATININE 0.61 02/07/2024 0534    Lab Results   Component Value Date/Time    CALCIUM 8.9 02/07/2024 0534    ALKPHOS 49 02/07/2024 0534    AST 21 02/07/2024 0534    ALT 22 02/07/2024 0534    BILITOT 0.9 02/07/2024 0534          Lab Results   Component Value Date/Time    WBC 4.8 02/07/2024 0534    HGB 13.2 02/07/2024 0534    HCT 40.2 02/07/2024 0534     02/07/2024 0534     No results found for: \"PROTIME\", \"PTT\", \"INR\"  Encounter Date: 02/06/24   ECG 12 lead   Result Value    Ventricular Rate 74    Atrial Rate 74    FL Interval 202    QRS Duration 84    QT Interval 380    QTC Calculation(Bazett) 421    P Axis 68    R Axis 63    T Axis 60    QRS Count 12    Q Onset 223    P Onset 122    P Offset 186    T Offset 413    " "QTC [facility] 407    Narrative    Normal sinus rhythm  Normal ECG  When compared with ECG of 30-JAN-2023 08:22,  Criteria for Septal infarct are no longer Present  Non-specific change in ST segment in Anterior leads  T wave inversion no longer evident in Anterior leads     No results found for this or any previous visit from the past 1095 days.     Stress 1/30/2023:  Baseline Echo: Normal left ventricular size and function. There are no regional wall motion abnormalities at baseline.     Stress Echo: Normal left ventricular size and function during peak stress. There are no stress induced regional wall motion abnormalities.    Summary:   1. No clinical, electrocardiographic or echocardiographic evidence for ischemia at maximal workload.   2. No ECG changes from baseline.   3. The adequate level of stress was achieved.  Visit Vitals  /63   Pulse 72   Temp 36.3 °C (97.3 °F)   Resp 18   Ht 1.77 m (5' 9.69\")   Wt 120 kg (265 lb)   SpO2 97%   BMI 38.37 kg/m²   Smoking Status Former   BSA 2.43 m²        Physical Exam    Airway  Mallampati: III  TM distance: >3 FB  Neck ROM: full     Cardiovascular   Rhythm: regular  Rate: normal     Dental - normal exam     Pulmonary   Breath sounds clear to auscultation     Abdominal - normal exam       Other findings: 2021: mac 3, grade 3, 1 attempt           Anesthesia Plan    History of general anesthesia?: yes  History of complications of general anesthesia?: no    ASA 2     general   (General with ETT)  intravenous induction   Postoperative administration of opioids is intended.  Trial extubation is planned.  Anesthetic plan and risks discussed with patient.    Plan discussed with CAA and CRNA.        "

## 2024-02-07 NOTE — PROGRESS NOTES
"Gonzalez Jason is a 48 y.o. female on day 0 of admission presenting with Right upper quadrant abdominal pain.    Subjective   Still c/o 8/10 pain to abdomen        Objective     Physical Exam  Constitutional:       Appearance: Normal appearance.   Cardiovascular:      Rate and Rhythm: Normal rate and regular rhythm.      Pulses: Normal pulses.      Heart sounds: Normal heart sounds. No murmur heard.     No gallop.   Pulmonary:      Effort: Pulmonary effort is normal. No respiratory distress.      Breath sounds: Normal breath sounds. No wheezing or rhonchi.   Abdominal:      General: Abdomen is flat. There is no distension.      Palpations: Abdomen is soft.      Tenderness: There is no abdominal tenderness. There is no guarding.   Musculoskeletal:         General: Normal range of motion.   Skin:     General: Skin is warm.      Capillary Refill: Capillary refill takes less than 2 seconds.   Neurological:      Mental Status: She is alert and oriented to person, place, and time.   Psychiatric:         Mood and Affect: Mood normal.         Thought Content: Thought content normal.         Judgment: Judgment normal.         Last Recorded Vitals  Blood pressure 104/63, pulse 72, temperature 36.3 °C (97.3 °F), resp. rate 18, height 1.77 m (5' 9.69\"), weight 120 kg (265 lb), SpO2 97 %.  Intake/Output last 3 Shifts:  No intake/output data recorded.    Relevant Results  Scheduled medications  amphetamine-dextroamphetamine, 10 mg, oral, BID after breakfast and lunch  DULoxetine, 60 mg, oral, Daily  [Held by provider] enoxaparin, 40 mg, subcutaneous, q24h  pantoprazole, 40 mg, oral, BID  sincalide (Kinevac) injection 2.4 mcg, 2.4 mcg, intravenous, Once      Continuous medications  lactated Ringer's, 75 mL/hr, Last Rate: 75 mL/hr (02/06/24 2310)      PRN medications  PRN medications: acetaminophen **OR** acetaminophen **OR** acetaminophen, alum-mag hydroxide-simeth, dextromethorphan-guaifenesin, ketorolac, melatonin, morphine, " ondansetron ODT **OR** ondansetron, polyethylene glycol, traZODone  Results for orders placed or performed during the hospital encounter of 02/06/24 (from the past 24 hour(s))   Lactate   Result Value Ref Range    Lactate 1.7 0.4 - 2.0 mmol/L   Lipase   Result Value Ref Range    Lipase 9 9 - 82 U/L   Comprehensive Metabolic Panel   Result Value Ref Range    Glucose 78 74 - 99 mg/dL    Sodium 137 136 - 145 mmol/L    Potassium 4.3 3.5 - 5.3 mmol/L    Chloride 103 98 - 107 mmol/L    Bicarbonate 29 21 - 32 mmol/L    Anion Gap 9 (L) 10 - 20 mmol/L    Urea Nitrogen 14 6 - 23 mg/dL    Creatinine 0.62 0.50 - 1.05 mg/dL    eGFR >90 >60 mL/min/1.73m*2    Calcium 9.2 8.6 - 10.3 mg/dL    Albumin 3.7 3.4 - 5.0 g/dL    Alkaline Phosphatase 62 33 - 110 U/L    Total Protein 6.3 (L) 6.4 - 8.2 g/dL    AST 24 9 - 39 U/L    Bilirubin, Total 0.9 0.0 - 1.2 mg/dL    ALT 31 7 - 45 U/L   CBC and Auto Differential   Result Value Ref Range    WBC 4.1 (L) 4.4 - 11.3 x10*3/uL    nRBC 0.0 0.0 - 0.0 /100 WBCs    RBC 4.65 4.00 - 5.20 x10*6/uL    Hemoglobin 13.7 12.0 - 16.0 g/dL    Hematocrit 42.6 36.0 - 46.0 %    MCV 92 80 - 100 fL    MCH 29.5 26.0 - 34.0 pg    MCHC 32.2 32.0 - 36.0 g/dL    RDW 12.5 11.5 - 14.5 %    Platelets 260 150 - 450 x10*3/uL    Neutrophils % 54.6 40.0 - 80.0 %    Immature Granulocytes %, Automated 0.2 0.0 - 0.9 %    Lymphocytes % 33.3 13.0 - 44.0 %    Monocytes % 7.7 2.0 - 10.0 %    Eosinophils % 2.7 0.0 - 6.0 %    Basophils % 1.5 0.0 - 2.0 %    Neutrophils Absolute 2.21 1.20 - 7.70 x10*3/uL    Immature Granulocytes Absolute, Automated 0.01 0.00 - 0.70 x10*3/uL    Lymphocytes Absolute 1.35 1.20 - 4.80 x10*3/uL    Monocytes Absolute 0.31 0.10 - 1.00 x10*3/uL    Eosinophils Absolute 0.11 0.00 - 0.70 x10*3/uL    Basophils Absolute 0.06 0.00 - 0.10 x10*3/uL   Urinalysis with Reflex Culture and Microscopic   Result Value Ref Range    Color, Urine Yellow Straw, Yellow    Appearance, Urine Clear Clear    Specific Gravity, Urine  1.006 1.005 - 1.035    pH, Urine 6.0 5.0, 5.5, 6.0, 6.5, 7.0, 7.5, 8.0    Protein, Urine NEGATIVE NEGATIVE mg/dL    Glucose, Urine NEGATIVE NEGATIVE mg/dL    Blood, Urine NEGATIVE NEGATIVE    Ketones, Urine NEGATIVE NEGATIVE mg/dL    Bilirubin, Urine NEGATIVE NEGATIVE    Urobilinogen, Urine <2.0 <2.0 mg/dL    Nitrite, Urine NEGATIVE NEGATIVE    Leukocyte Esterase, Urine TRACE (A) NEGATIVE   Microscopic Only, Urine   Result Value Ref Range    WBC, Urine 1-5 1-5, NONE /HPF    RBC, Urine NONE NONE, 1-2, 3-5 /HPF    Mucus, Urine 1+ Reference range not established. /LPF   ECG 12 lead   Result Value Ref Range    Ventricular Rate 74 BPM    Atrial Rate 74 BPM    IL Interval 202 ms    QRS Duration 84 ms    QT Interval 380 ms    QTC Calculation(Bazett) 421 ms    P Axis 68 degrees    R Axis 63 degrees    T Axis 60 degrees    QRS Count 12 beats    Q Onset 223 ms    P Onset 122 ms    P Offset 186 ms    T Offset 413 ms    QTC Fredericia 407 ms   CBC   Result Value Ref Range    WBC 4.8 4.4 - 11.3 x10*3/uL    nRBC 0.0 0.0 - 0.0 /100 WBCs    RBC 4.41 4.00 - 5.20 x10*6/uL    Hemoglobin 13.2 12.0 - 16.0 g/dL    Hematocrit 40.2 36.0 - 46.0 %    MCV 91 80 - 100 fL    MCH 29.9 26.0 - 34.0 pg    MCHC 32.8 32.0 - 36.0 g/dL    RDW 12.4 11.5 - 14.5 %    Platelets 231 150 - 450 x10*3/uL   Comprehensive metabolic panel   Result Value Ref Range    Glucose 92 74 - 99 mg/dL    Sodium 138 136 - 145 mmol/L    Potassium 4.3 3.5 - 5.3 mmol/L    Chloride 104 98 - 107 mmol/L    Bicarbonate 31 21 - 32 mmol/L    Anion Gap 7 (L) 10 - 20 mmol/L    Urea Nitrogen 10 6 - 23 mg/dL    Creatinine 0.61 0.50 - 1.05 mg/dL    eGFR >90 >60 mL/min/1.73m*2    Calcium 8.9 8.6 - 10.3 mg/dL    Albumin 3.5 3.4 - 5.0 g/dL    Alkaline Phosphatase 49 33 - 110 U/L    Total Protein 5.7 (L) 6.4 - 8.2 g/dL    AST 21 9 - 39 U/L    Bilirubin, Total 0.9 0.0 - 1.2 mg/dL    ALT 22 7 - 45 U/L   US gallbladder    Result Date: 2/6/2024  Interpreted By:  Carroll Cox, STUDY: US  GALLBLADDER  2/6/2024 1:06 pm   INDICATION: 47 y/o   F with  Signs/Symptoms:RUQ pain.   COMPARISON: CT abdomen and pelvis 02/06/2024   ACCESSION NUMBER(S): [id number]   ORDERING CLINICIAN: VIRGINIE STEEN   TECHNIQUE: Routine ultrasound of the right upper quadrant was performed.  Static images were obtained for remote interpretation.   FINDINGS: LIVER: Craniocaudal length:  19.3 cm,  enlarged Echogenicity:  Normal. Mass:  7.2 x 6.9 x 7.1 cm mixed echogenicity left hepatic lobe mass, compatible with a hemangioma seen on same-day CT. Mildly increased echogenicity.   BILE DUCTS: Intrahepatic ducts: Non-dilated. Common bile duct diameter: 4 mm.   GALLBLADDER: Gallbladder:  Normal. Gallstones:  Present Gallbladder sludge:  None. Gallbladder wall thickening:  None. Pericholecystic fluid:  None. Negative sonographic Petit's sign.   PANCREAS:   Visualized portions are unremarkable.   RIGHT KIDNEY: Craniocaudal length:  10.3 cm,  within normal limits of size for age. No hydronephrosis, hydroureter or focal renal lesion.   PERITONEAL FLUID:   None seen in the right upper quadrant.       Hepatomegaly and mild steatosis. 7.2 cm left hepatic lobe hemangioma. Cholelithiasis without evidence of cholecystitis.   Signed by: Carroll Cox 2/6/2024 1:30 PM Dictation workstation:   [id number]    CT abdomen pelvis w IV contrast    Result Date: 2/6/2024  Interpreted By:  Mona Neff, STUDY: CT ABDOMEN PELVIS W IV CONTRAST;  2/6/2024 10:01 am   INDICATION: 47 y/o   F with  Signs/Symptoms:RUQ, right flank, and epigastric pain, nausea vomiting diarrhea.   LIMITATIONS: None.   ACCESSION NUMBER(S): [id number]   ORDERING CLINICIAN: VIRGINIE STEEN   TECHNIQUE: After the administration of IV iodonated contrast, spiral axial images were obtained from the xiphoid down through the symphysis pubis. Sagittal and coronal reconstruction images were generated.   COMPARISON: None.   FINDINGS: Lower Chest: Streaky bibasilar atelectasis.  Small hiatal hernia.   Liver: Lesion left lateral segment of the liver measuring 8.9 x 7.1 cm with discontinuous peripheral nodular enhancement suggestive of hemangioma.   Gallbladder and Biliary: Unremarkable.   Pancreas: No abnormality identified in the pancreas.   Spleen: No abnormality identified in the spleen.   Adrenals: No abnormality identified in either adrenal gland.   Urinary: No parenchymal abnormality identified in either kidney. No hydronephrosis.   Reproductive: Status post hysterectomy.   Gastrointestinal/Peritoneum: No small or large bowel obstruction in the visualized abdomen. In the abdomen, there is no extraluminal air. No significant free fluid. Gastric sleeve. No evidence of acute appendicitis.   Vascular: Abdominal aorta is normal in caliber.Retroaortic left renal vein.   Lymphatics: No enlarged lymph nodes by size criteria.   MSK/Body Wall: No aggressive bony lesion identified.       No acute abnormality.   Hepatic hemangioma.   Signed by: Mona Neff 2/6/2024 10:43 AM Dictation workstation:   [id number]    ECG 12 lead    Result Date: 2/6/2024  Normal sinus rhythm Normal ECG When compared with ECG of 30-JAN-2023 08:22, Criteria for Septal infarct are no longer Present Non-specific change in ST segment in Anterior leads T wave inversion no longer evident in Anterior leads    US abdomen    Result Date: 1/15/2024  Interpreted By:  Joseph Carlson, STUDY: US ABDOMEN;  1/13/2024 11:30 am   INDICATION: Signs/Symptoms:epigastric pain.   COMPARISON: None.   ACCESSION NUMBER(S): [id number]   ORDERING CLINICIAN: MARIAELENA GUERRA   TECHNIQUE: Multiple images of the right upper quadrant were obtained.  Color Doppler was also performed .   FINDINGS: PANCREAS: The pancreatic tail is not well visualized due to overlying bowel, otherwise the visualized pancreatic parenchyma appeared homogeneous. There is no ductal dilatation .   LIVER: An approximate 5.9 x 4.3 x 5.7 cm area of slight heterogeneous but  overall hyperechogenicity is seen within the left liver lobe. This is probably a somewhat atypical hemangioma given the heterogeneity. However, given other lesions including abscess, malignancy etc. Are possible, CT hepatic protocol would be recommended for confirmation and further evaluation. There is mild diffuse heterogeneity of the liver probably due to geographic fatty infiltration, and 2 cm cyst at the left liver lobe, but again further evaluation would be recommended. The liver measures  15.6 cm in length.   GALLBLADDER: The gallbladder wall is borderline measuring 3 mm. The technician noted tenderness at the area when scanning. If there is clinical suspicion of acute cholecystitis radionuclide imaging for further evaluation should be considered.   BILIARY TREE: No intra or extrahepatic biliary dilatation is identified. The common bile duct measures 7 mm.   RIGHT KIDNEY: The right kidney is normal in size, measuring 11 cm in craniocaudal dimension. The renal cortical echogenicity and thickness are within normal limits. No hydronephrosis or renal calculi are evident.   LEFT KIDNEY: The left kidney was poorly visualized particularly at the poles, due to overlying bowel gas. Considering this it appears to measure approximately 11 cm in length, otherwise grossly unremarkable without obvious hydronephrosis or calculi.   SPLEEN: Homogeneous parenchyma and within normal size limits measuring  11 cm in length.   OTHER: No free fluid The bladder contour is smooth.   VESSELS: Hepatopetal blood flow  and patency of the hepatic veins by color doppler. Aorta: Unremarkable, measuring up to 2.2 cm in diameter at the proximal segment. IVC: Patent       Probable hepatic hemangioma and geographic fatty infiltration. However, further evaluation such as with CT hepatic protocol would be recommended. Arslan gallbladder with borderline wall thickness and mild pain with scanning. Suboptimal visualization of the left kidney, and  pancreas.   Signed by: Joseph Carlson 1/15/2024 12:21 PM Dictation workstation:   [id number]                    Assessment/Plan   Principal Problem:    Right upper quadrant abdominal pain    This is a 48 y.o. female with PMH of anxiety/depression, sciatica, pyelonephritis, obesity status post gastric sleeve surgery 2021, HTN, GERD, hepatic hemangioma, ADD, presenting due to a ~3-month history of epigastric/RUQ/flank pain which has been worsening in frequency and severity. She describes pain as dull and occasionally shooting, often associated with nausea and occasional SOB due to severity, worse after eating solids.   Pt also reports worsening heartburn x 2 weeks, has been complaint with PPI BID, last EGD in 2023 - small hiatal hernia. Denies NSAIDs, melena.   She denies personal hx of CAD or VTE/PE.   Workup with stable labs including LFTs, unremarkable UA, stable EKG, CT abdomen/pelvis with contrast showed a known liver hemangioma, gallbladder ultrasound with cholelithiasis, mild liver steatosis.  Patient given IVF, analgesics/antiemetics, admitted for further care.  Seen by general surgery, and for HIDA and possible cholecystectomy     Epigastric pain/ RUQ pain  -cholelithiasis, concern for early cholecystitis   -RUQ US: cholelithiasis   -surgery consulted, appreciate recs: plan for HIDA today, if positive plan for lap jono today  -LFT's WNL  -no leukocytosis  -UA neg     DVT prophylaxis:  Held for HIDA/ potential procedure, SCD    DC plan:  DC home when medically stable    Labs/Testing reviewed    Interdisciplinary team rounding completed with hospitalist, nurse, TCC    NP discussed plan and lab/testing results with Dr. Xavier         I spent 50 minutes in the professional and overall care of this patient.      Arcelia Adair, APRN-CNP

## 2024-02-08 VITALS
RESPIRATION RATE: 17 BRPM | BODY MASS INDEX: 39.25 KG/M2 | HEART RATE: 76 BPM | HEIGHT: 69 IN | DIASTOLIC BLOOD PRESSURE: 80 MMHG | WEIGHT: 265 LBS | OXYGEN SATURATION: 97 % | TEMPERATURE: 98.9 F | SYSTOLIC BLOOD PRESSURE: 123 MMHG

## 2024-02-08 LAB
ALBUMIN SERPL BCP-MCNC: 3.7 G/DL (ref 3.4–5)
ALP SERPL-CCNC: 57 U/L (ref 33–110)
ALT SERPL W P-5'-P-CCNC: 33 U/L (ref 7–45)
ANION GAP SERPL CALC-SCNC: 12 MMOL/L (ref 10–20)
AST SERPL W P-5'-P-CCNC: 28 U/L (ref 9–39)
BACTERIA UR CULT: NO GROWTH
BILIRUB SERPL-MCNC: 0.8 MG/DL (ref 0–1.2)
BUN SERPL-MCNC: 14 MG/DL (ref 6–23)
CALCIUM SERPL-MCNC: 9 MG/DL (ref 8.6–10.3)
CHLORIDE SERPL-SCNC: 102 MMOL/L (ref 98–107)
CO2 SERPL-SCNC: 28 MMOL/L (ref 21–32)
CREAT SERPL-MCNC: 0.61 MG/DL (ref 0.5–1.05)
EGFRCR SERPLBLD CKD-EPI 2021: >90 ML/MIN/1.73M*2
ERYTHROCYTE [DISTWIDTH] IN BLOOD BY AUTOMATED COUNT: 12.2 % (ref 11.5–14.5)
GLUCOSE SERPL-MCNC: 138 MG/DL (ref 74–99)
HCT VFR BLD AUTO: 41.6 % (ref 36–46)
HGB BLD-MCNC: 13.8 G/DL (ref 12–16)
MCH RBC QN AUTO: 29.6 PG (ref 26–34)
MCHC RBC AUTO-ENTMCNC: 33.2 G/DL (ref 32–36)
MCV RBC AUTO: 89 FL (ref 80–100)
NRBC BLD-RTO: 0 /100 WBCS (ref 0–0)
PLATELET # BLD AUTO: 262 X10*3/UL (ref 150–450)
POTASSIUM SERPL-SCNC: 4.5 MMOL/L (ref 3.5–5.3)
PROT SERPL-MCNC: 6.2 G/DL (ref 6.4–8.2)
RBC # BLD AUTO: 4.66 X10*6/UL (ref 4–5.2)
SODIUM SERPL-SCNC: 137 MMOL/L (ref 136–145)
WBC # BLD AUTO: 11 X10*3/UL (ref 4.4–11.3)

## 2024-02-08 PROCEDURE — 80053 COMPREHEN METABOLIC PANEL: CPT | Performed by: SURGERY

## 2024-02-08 PROCEDURE — 96372 THER/PROPH/DIAG INJ SC/IM: CPT | Mod: ZK

## 2024-02-08 PROCEDURE — 2500000001 HC RX 250 WO HCPCS SELF ADMINISTERED DRUGS (ALT 637 FOR MEDICARE OP): Performed by: SURGERY

## 2024-02-08 PROCEDURE — G2212 PROLONG OUTPT/OFFICE VIS: HCPCS | Performed by: NURSE PRACTITIONER

## 2024-02-08 PROCEDURE — 2500000004 HC RX 250 GENERAL PHARMACY W/ HCPCS (ALT 636 FOR OP/ED)

## 2024-02-08 PROCEDURE — 2500000004 HC RX 250 GENERAL PHARMACY W/ HCPCS (ALT 636 FOR OP/ED): Performed by: SURGERY

## 2024-02-08 PROCEDURE — 36415 COLL VENOUS BLD VENIPUNCTURE: CPT | Performed by: SURGERY

## 2024-02-08 PROCEDURE — 99233 SBSQ HOSP IP/OBS HIGH 50: CPT | Performed by: NURSE PRACTITIONER

## 2024-02-08 PROCEDURE — 85027 COMPLETE CBC AUTOMATED: CPT | Performed by: SURGERY

## 2024-02-08 PROCEDURE — G0378 HOSPITAL OBSERVATION PER HR: HCPCS

## 2024-02-08 RX ORDER — OXYCODONE HYDROCHLORIDE 5 MG/1
5 TABLET ORAL EVERY 6 HOURS PRN
Qty: 12 TABLET | Refills: 0 | Status: SHIPPED | OUTPATIENT
Start: 2024-02-08 | End: 2024-02-20 | Stop reason: WASHOUT

## 2024-02-08 RX ORDER — ONDANSETRON 4 MG/1
4 TABLET, FILM COATED ORAL EVERY 8 HOURS PRN
Qty: 20 TABLET | Refills: 0 | Status: SHIPPED | OUTPATIENT
Start: 2024-02-08 | End: 2024-02-20 | Stop reason: WASHOUT

## 2024-02-08 RX ADMIN — ENOXAPARIN SODIUM 40 MG: 40 INJECTION SUBCUTANEOUS at 10:07

## 2024-02-08 RX ADMIN — ALUMINUM HYDROXIDE, MAGNESIUM HYDROXIDE, AND DIMETHICONE 30 ML: 200; 20; 200 SUSPENSION ORAL at 10:14

## 2024-02-08 RX ADMIN — PANTOPRAZOLE SODIUM 40 MG: 40 TABLET, DELAYED RELEASE ORAL at 10:07

## 2024-02-08 RX ADMIN — SODIUM CHLORIDE, POTASSIUM CHLORIDE, SODIUM LACTATE AND CALCIUM CHLORIDE 75 ML/HR: 600; 310; 30; 20 INJECTION, SOLUTION INTRAVENOUS at 06:03

## 2024-02-08 RX ADMIN — KETOROLAC TROMETHAMINE 15 MG: 30 INJECTION, SOLUTION INTRAMUSCULAR; INTRAVENOUS at 03:47

## 2024-02-08 RX ADMIN — DULOXETINE HYDROCHLORIDE 60 MG: 30 CAPSULE, DELAYED RELEASE ORAL at 10:07

## 2024-02-08 RX ADMIN — KETOROLAC TROMETHAMINE 15 MG: 30 INJECTION, SOLUTION INTRAMUSCULAR; INTRAVENOUS at 09:54

## 2024-02-08 RX ADMIN — MORPHINE SULFATE 1 MG: 2 INJECTION, SOLUTION INTRAMUSCULAR; INTRAVENOUS at 00:19

## 2024-02-08 RX ADMIN — POLYETHYLENE GLYCOL 3350 17 G: 17 POWDER, FOR SOLUTION ORAL at 10:17

## 2024-02-08 RX ADMIN — ONDANSETRON 4 MG: 2 INJECTION INTRAMUSCULAR; INTRAVENOUS at 09:56

## 2024-02-08 ASSESSMENT — PAIN SCALES - GENERAL
PAINLEVEL_OUTOF10: 3
PAINLEVEL_OUTOF10: 2
PAINLEVEL_OUTOF10: 6
PAINLEVEL_OUTOF10: 10 - WORST POSSIBLE PAIN
PAINLEVEL_OUTOF10: 7

## 2024-02-08 ASSESSMENT — PAIN - FUNCTIONAL ASSESSMENT
PAIN_FUNCTIONAL_ASSESSMENT: 0-10

## 2024-02-08 ASSESSMENT — PAIN DESCRIPTION - LOCATION: LOCATION: ABDOMEN

## 2024-02-08 NOTE — NURSING NOTE

## 2024-02-08 NOTE — DISCHARGE SUMMARY
Gonzalez Jason is a 48 y.o. female on day 0 of admission presenting with Right upper quadrant abdominal pain.    Subjective   Still having some abdominal discomfort but it is improved compared to yesterday. Reports difficulty voiding and nausea        Your medication list        START taking these medications        Instructions Last Dose Given Next Dose Due   oxyCODONE 5 mg immediate release tablet  Commonly known as: Roxicodone      Take 1 tablet (5 mg) by mouth every 6 hours if needed for moderate pain (4 - 6).              CHANGE how you take these medications        Instructions Last Dose Given Next Dose Due   ondansetron 4 mg tablet  Commonly known as: Zofran  What changed: Another medication with the same name was added. Make sure you understand how and when to take each.           ondansetron 4 mg tablet  Commonly known as: Zofran  What changed: You were already taking a medication with the same name, and this prescription was added. Make sure you understand how and when to take each.      Take 1 tablet (4 mg) by mouth every 8 hours if needed for nausea or vomiting.              CONTINUE taking these medications        Instructions Last Dose Given Next Dose Due   amphetamine-dextroamphetamine XR 20 mg 24 hr capsule  Commonly known as: Adderall XR      Take 1 capsule (20 mg) by mouth once daily. Do not crush or chew.       beta carotene 3,000 mcg (10,000 unit) capsule  Commonly known as: vitamin A           calcium citrate-vitamin D3 250 mg-5 mcg (200 unit) tablet           DULoxetine 60 mg DR capsule  Commonly known as: Cymbalta      Take 1 capsule (60 mg) by mouth once daily. Do not crush or chew.       fexofenadine 180 mg tablet  Commonly known as: Allegra           lisdexamfetamine 50 mg capsule  Commonly known as: Vyvanse      Take 1 capsule (50 mg) by mouth once daily in the morning.       lisdexamfetamine 50 mg capsule  Commonly known as: Vyvanse      Take 1 capsule (50 mg) by mouth once daily in the  morning. Do not start before November 3, 2023.       lisdexamfetamine 50 mg capsule  Commonly known as: Vyvanse      Take 1 capsule (50 mg) by mouth once daily in the morning.       lisdexamfetamine 50 mg capsule  Commonly known as: Vyvanse      Take 1 capsule (50 mg) by mouth once daily in the morning. Do not start before December 3, 2023.       pantoprazole 40 mg EC tablet  Commonly known as: ProtoNix           traZODone 50 mg tablet  Commonly known as: Desyrel      TAKE 1 TABLET (50 MG) BY MOUTH AS NEEDED AT BEDTIME FOR SLEEP.       vitamin B complex tablet           Vitamin D3 25 MCG (1000 UT) tablet  Generic drug: cholecalciferol           zinc gluconate 50 mg tablet                     Where to Get Your Medications        These medications were sent to [facility]/pharmacy #[id number] - [address] AT [address] OF [place] [id number]      Phone: [phone]   ondansetron 4 mg tablet  oxyCODONE 5 mg immediate release tablet         Objective     Physical Exam  Constitutional:       Appearance: Normal appearance.   Cardiovascular:      Rate and Rhythm: Normal rate and regular rhythm.      Pulses: Normal pulses.      Heart sounds: Normal heart sounds. No murmur heard.     No gallop.   Pulmonary:      Effort: Pulmonary effort is normal. No respiratory distress.      Breath sounds: Normal breath sounds. No wheezing or rhonchi.   Abdominal:      General: Abdomen is flat. There is distension.      Palpations: Abdomen is soft.      Tenderness: There is no abdominal tenderness. There is no guarding.   Musculoskeletal:         General: Normal range of motion.   Skin:     General: Skin is warm.      Capillary Refill: Capillary refill takes less than 2 seconds.      Comments: Abdominal lap sites with no drainage   Neurological:      Mental Status: She is alert and oriented to person, place, and time.   Psychiatric:         Mood and Affect: Mood normal.         Thought Content: Thought content normal.   "       Judgment: Judgment normal.         Last Recorded Vitals  Blood pressure (!) 154/98, pulse 74, temperature 37 °C (98.6 °F), temperature source Temporal, resp. rate 17, height 1.753 m (5' 9\"), weight 120 kg (265 lb), SpO2 99 %.  Intake/Output last 3 Shifts:  I/O last 3 completed shifts:  In: 3216.7 (26.8 mL/kg) [P.O.:150; I.V.:2966.7 (24.7 mL/kg); IV Piggyback:100]  Out: 270 (2.2 mL/kg) [Urine:250 (0.1 mL/kg/hr); Blood:20]  Weight: 120.2 kg     Relevant Results  Scheduled medications  amphetamine-dextroamphetamine, 10 mg, oral, BID after breakfast and lunch  DULoxetine, 60 mg, oral, Daily  enoxaparin, 40 mg, subcutaneous, q24h  pantoprazole, 40 mg, oral, BID  polyethylene glycol, 17 g, oral, Daily      Continuous medications  lactated Ringer's, 75 mL/hr, Last Rate: 75 mL/hr (02/08/24 0603)      PRN medications  PRN medications: acetaminophen **OR** acetaminophen **OR** acetaminophen, alum-mag hydroxide-simeth, dextromethorphan-guaifenesin, ketorolac, melatonin, morphine, ondansetron ODT **OR** ondansetron, traZODone  Results for orders placed or performed during the hospital encounter of 02/06/24 (from the past 24 hour(s))   CBC   Result Value Ref Range    WBC 11.0 4.4 - 11.3 x10*3/uL    nRBC 0.0 0.0 - 0.0 /100 WBCs    RBC 4.66 4.00 - 5.20 x10*6/uL    Hemoglobin 13.8 12.0 - 16.0 g/dL    Hematocrit 41.6 36.0 - 46.0 %    MCV 89 80 - 100 fL    MCH 29.6 26.0 - 34.0 pg    MCHC 33.2 32.0 - 36.0 g/dL    RDW 12.2 11.5 - 14.5 %    Platelets 262 150 - 450 x10*3/uL   Comprehensive metabolic panel   Result Value Ref Range    Glucose 138 (H) 74 - 99 mg/dL    Sodium 137 136 - 145 mmol/L    Potassium 4.5 3.5 - 5.3 mmol/L    Chloride 102 98 - 107 mmol/L    Bicarbonate 28 21 - 32 mmol/L    Anion Gap 12 10 - 20 mmol/L    Urea Nitrogen 14 6 - 23 mg/dL    Creatinine 0.61 0.50 - 1.05 mg/dL    eGFR >90 >60 mL/min/1.73m*2    Calcium 9.0 8.6 - 10.3 mg/dL    Albumin 3.7 3.4 - 5.0 g/dL    Alkaline Phosphatase 57 33 - 110 U/L    Total " Protein 6.2 (L) 6.4 - 8.2 g/dL    AST 28 9 - 39 U/L    Bilirubin, Total 0.8 0.0 - 1.2 mg/dL    ALT 33 7 - 45 U/L   US gallbladder    Result Date: 2/6/2024  Interpreted By:  Carroll Cox, STUDY: US GALLBLADDER  2/6/2024 1:06 pm   INDICATION: 49 y/o   F with  Signs/Symptoms:RUQ pain.   COMPARISON: CT abdomen and pelvis 02/06/2024   ACCESSION NUMBER(S): [id number]   ORDERING CLINICIAN: VIRGINIE STEEN   TECHNIQUE: Routine ultrasound of the right upper quadrant was performed.  Static images were obtained for remote interpretation.   FINDINGS: LIVER: Craniocaudal length:  19.3 cm,  enlarged Echogenicity:  Normal. Mass:  7.2 x 6.9 x 7.1 cm mixed echogenicity left hepatic lobe mass, compatible with a hemangioma seen on same-day CT. Mildly increased echogenicity.   BILE DUCTS: Intrahepatic ducts: Non-dilated. Common bile duct diameter: 4 mm.   GALLBLADDER: Gallbladder:  Normal. Gallstones:  Present Gallbladder sludge:  None. Gallbladder wall thickening:  None. Pericholecystic fluid:  None. Negative sonographic Petit's sign.   PANCREAS:   Visualized portions are unremarkable.   RIGHT KIDNEY: Craniocaudal length:  10.3 cm,  within normal limits of size for age. No hydronephrosis, hydroureter or focal renal lesion.   PERITONEAL FLUID:   None seen in the right upper quadrant.       Hepatomegaly and mild steatosis. 7.2 cm left hepatic lobe hemangioma. Cholelithiasis without evidence of cholecystitis.   Signed by: Carroll Cox 2/6/2024 1:30 PM Dictation workstation:   [id number]    CT abdomen pelvis w IV contrast    Result Date: 2/6/2024  Interpreted By:  Mona Neff, STUDY: CT ABDOMEN PELVIS W IV CONTRAST;  2/6/2024 10:01 am   INDICATION: 49 y/o   F with  Signs/Symptoms:RUQ, right flank, and epigastric pain, nausea vomiting diarrhea.   LIMITATIONS: None.   ACCESSION NUMBER(S): [id number]   ORDERING CLINICIAN: VIRGINIE STEEN   TECHNIQUE: After the administration of IV iodonated contrast, spiral axial images  were obtained from the xiphoid down through the symphysis pubis. Sagittal and coronal reconstruction images were generated.   COMPARISON: None.   FINDINGS: Lower Chest: Streaky bibasilar atelectasis. Small hiatal hernia.   Liver: Lesion left lateral segment of the liver measuring 8.9 x 7.1 cm with discontinuous peripheral nodular enhancement suggestive of hemangioma.   Gallbladder and Biliary: Unremarkable.   Pancreas: No abnormality identified in the pancreas.   Spleen: No abnormality identified in the spleen.   Adrenals: No abnormality identified in either adrenal gland.   Urinary: No parenchymal abnormality identified in either kidney. No hydronephrosis.   Reproductive: Status post hysterectomy.   Gastrointestinal/Peritoneum: No small or large bowel obstruction in the visualized abdomen. In the abdomen, there is no extraluminal air. No significant free fluid. Gastric sleeve. No evidence of acute appendicitis.   Vascular: Abdominal aorta is normal in caliber.Retroaortic left renal vein.   Lymphatics: No enlarged lymph nodes by size criteria.   MSK/Body Wall: No aggressive bony lesion identified.       No acute abnormality.   Hepatic hemangioma.   Signed by: Mona Neff 2/6/2024 10:43 AM Dictation workstation:   [id number]    ECG 12 lead    Result Date: 2/6/2024  Normal sinus rhythm Normal ECG When compared with ECG of 30-JAN-2023 08:22, Criteria for Septal infarct are no longer Present Non-specific change in ST segment in Anterior leads T wave inversion no longer evident in Anterior leads    US abdomen    Result Date: 1/15/2024  Interpreted By:  Joseph Carlson, STUDY: US ABDOMEN;  1/13/2024 11:30 am   INDICATION: Signs/Symptoms:epigastric pain.   COMPARISON: None.   ACCESSION NUMBER(S): [id number]   ORDERING CLINICIAN: MARIAELENA GUERRA   TECHNIQUE: Multiple images of the right upper quadrant were obtained.  Color Doppler was also performed .   FINDINGS: PANCREAS: The pancreatic tail is not well visualized due to  overlying bowel, otherwise the visualized pancreatic parenchyma appeared homogeneous. There is no ductal dilatation .   LIVER: An approximate 5.9 x 4.3 x 5.7 cm area of slight heterogeneous but overall hyperechogenicity is seen within the left liver lobe. This is probably a somewhat atypical hemangioma given the heterogeneity. However, given other lesions including abscess, malignancy etc. Are possible, CT hepatic protocol would be recommended for confirmation and further evaluation. There is mild diffuse heterogeneity of the liver probably due to geographic fatty infiltration, and 2 cm cyst at the left liver lobe, but again further evaluation would be recommended. The liver measures  15.6 cm in length.   GALLBLADDER: The gallbladder wall is borderline measuring 3 mm. The technician noted tenderness at the area when scanning. If there is clinical suspicion of acute cholecystitis radionuclide imaging for further evaluation should be considered.   BILIARY TREE: No intra or extrahepatic biliary dilatation is identified. The common bile duct measures 7 mm.   RIGHT KIDNEY: The right kidney is normal in size, measuring 11 cm in craniocaudal dimension. The renal cortical echogenicity and thickness are within normal limits. No hydronephrosis or renal calculi are evident.   LEFT KIDNEY: The left kidney was poorly visualized particularly at the poles, due to overlying bowel gas. Considering this it appears to measure approximately 11 cm in length, otherwise grossly unremarkable without obvious hydronephrosis or calculi.   SPLEEN: Homogeneous parenchyma and within normal size limits measuring  11 cm in length.   OTHER: No free fluid The bladder contour is smooth.   VESSELS: Hepatopetal blood flow  and patency of the hepatic veins by color doppler. Aorta: Unremarkable, measuring up to 2.2 cm in diameter at the proximal segment. IVC: Patent       Probable hepatic hemangioma and geographic fatty infiltration. However, further  evaluation such as with CT hepatic protocol would be recommended. Arslan gallbladder with borderline wall thickness and mild pain with scanning. Suboptimal visualization of the left kidney, and pancreas.   Signed by: Joseph Carlson 1/15/2024 12:21 PM Dictation workstation:   [id number]                    Assessment/Plan   Principal Problem:    Right upper quadrant abdominal pain  Active Problems:    Calculus of gallbladder without cholecystitis without obstruction    This is a 48 y.o. female with PMH of anxiety/depression, sciatica, pyelonephritis, obesity status post gastric sleeve surgery 2021, HTN, GERD, hepatic hemangioma, ADD, presenting due to a ~3-month history of epigastric/RUQ/flank pain which has been worsening in frequency and severity. She describes pain as dull and occasionally shooting, often associated with nausea and occasional SOB due to severity, worse after eating solids.   Pt also reports worsening heartburn x 2 weeks, has been complaint with PPI BID, last EGD in 2023 - small hiatal hernia. Denies NSAIDs, melena.   She denies personal hx of CAD or VTE/PE.   Workup with stable labs including LFTs, unremarkable UA, stable EKG, CT abdomen/pelvis with contrast showed a known liver hemangioma, gallbladder ultrasound with cholelithiasis, mild liver steatosis.  Patient given IVF, analgesics/antiemetics, admitted for further care.  Seen by general surgery, and for HIDA and possible cholecystectomy     Epigastric pain/ RUQ pain  -cholelithiasis, concern for early cholecystitis   -RUQ US: cholelithiasis   -Lap jono yesterday: adhesions to gallbladder consistent with chronic inflammation   -LFT's WNL  -no leukocytosis  -UA neg   -Did have PVR completed for reported difficulty urinating: PVR 26, no retention     DVT prophylaxis:  Held for HIDA/ potential procedure, SCD    DC plan:  DC home when medically stable    Labs/Testing reviewed    Interdisciplinary team rounding completed with hospitalist, nurse,  TCC    NP discussed plan and lab/testing results with Dr. Malloy . Surgery signed off. Will check 1 more PVR, if no retention can DC        I spent 50 minutes in the professional and overall care of this patient.      Arcelia Adair, APRN-CNP

## 2024-02-08 NOTE — DISCHARGE INSTRUCTIONS
Instructions After Laparoscopic Surgery    Wound Care:  - Ice packs to wounds every hour the first day  - Ok to shower 24 hours after surgery  - No baths or swimming for 2 weeks  - Keep wound clean and dry  - Do not apply topical creams or ointments  - Call if you notice redness around wound, foul-smelling drainage, or increasing pain     Diet:          - Low fat/ low cholesterol diet    Activity          - Take it easy for the first 48 hours          - Stairs and walks are fine          - Resume activities gradually over the first week          - Ask Dr. Flanagan before resuming strenuous physical exertions          - No driving while on pain medication    Medications          - Pain medicine prescription attached for severe pain          - Please take Motrin/ Ibuprofen 600 mg every 6 hours scheduled for 2-3 days, then every 6 hours as needed          - Please take Tylenol 625 mg every 6 hours scheduled for 2-3 days, then every 6 hours as needed          - Resume your home medications unless otherwise directed          - Oxycodone 5mg every 6 hours as needed for pain    Other Instructions          - Call to make appointment within 1-2 days: [phone], if one has not already been made for you.          - Call the doctor for the following:   Severe unrelieved pain   Fevers > 101F   Nausea/vomiting   Wound issues   Insurance/return to work forms   Shortness of breath   Chest pains    Other Instructions:  It is important to drink adequate fluid and avoid dehydration. Signs of dehydration include dark urine, decreased frequency in urine, pale mucous membrane, or dry mucous membranes. You should drink at least 8 8oz glasses of fluids a day and it should be a variety of fluids (water, juice, tea, coffee, etc.).  If you start to experience nausea, emesis, fever, or abdominal pain please call Dr. Fontenot's/ Dr. Flanagan office [phone].      Please follow up with surgery on 3-4 weeks, phone number listed above. Please  call to schedule

## 2024-02-08 NOTE — PROGRESS NOTES
"Gonzalez Jason is a 48 y.o. female on day 0 of admission presenting with Right upper quadrant abdominal pain.    Subjective   Still having some abdominal discomfort but it is improved compared to yesterday. Reports difficulty voiding and nausea       Objective     Physical Exam  Constitutional:       Appearance: Normal appearance.   Cardiovascular:      Rate and Rhythm: Normal rate and regular rhythm.      Pulses: Normal pulses.      Heart sounds: Normal heart sounds. No murmur heard.     No gallop.   Pulmonary:      Effort: Pulmonary effort is normal. No respiratory distress.      Breath sounds: Normal breath sounds. No wheezing or rhonchi.   Abdominal:      General: Abdomen is flat. There is distension.      Palpations: Abdomen is soft.      Tenderness: There is no abdominal tenderness. There is no guarding.   Musculoskeletal:         General: Normal range of motion.   Skin:     General: Skin is warm.      Capillary Refill: Capillary refill takes less than 2 seconds.      Comments: Abdominal lap sites with no drainage   Neurological:      Mental Status: She is alert and oriented to person, place, and time.   Psychiatric:         Mood and Affect: Mood normal.         Thought Content: Thought content normal.         Judgment: Judgment normal.         Last Recorded Vitals  Blood pressure 152/90, pulse 87, temperature 36.6 °C (97.9 °F), resp. rate 16, height 1.753 m (5' 9\"), weight 120 kg (265 lb), SpO2 98 %.  Intake/Output last 3 Shifts:  I/O last 3 completed shifts:  In: 3216.7 (26.8 mL/kg) [P.O.:150; I.V.:2966.7 (24.7 mL/kg); IV Piggyback:100]  Out: 270 (2.2 mL/kg) [Urine:250 (0.1 mL/kg/hr); Blood:20]  Weight: 120.2 kg     Relevant Results  Scheduled medications  amphetamine-dextroamphetamine, 10 mg, oral, BID after breakfast and lunch  DULoxetine, 60 mg, oral, Daily  enoxaparin, 40 mg, subcutaneous, q24h  pantoprazole, 40 mg, oral, BID  polyethylene glycol, 17 g, oral, Daily      Continuous medications  lactated " Ringer's, 75 mL/hr, Last Rate: 75 mL/hr (02/08/24 0603)      PRN medications  PRN medications: acetaminophen **OR** acetaminophen **OR** acetaminophen, alum-mag hydroxide-simeth, dextromethorphan-guaifenesin, ketorolac, melatonin, morphine, ondansetron ODT **OR** ondansetron, traZODone  Results for orders placed or performed during the hospital encounter of 02/06/24 (from the past 24 hour(s))   CBC   Result Value Ref Range    WBC 11.0 4.4 - 11.3 x10*3/uL    nRBC 0.0 0.0 - 0.0 /100 WBCs    RBC 4.66 4.00 - 5.20 x10*6/uL    Hemoglobin 13.8 12.0 - 16.0 g/dL    Hematocrit 41.6 36.0 - 46.0 %    MCV 89 80 - 100 fL    MCH 29.6 26.0 - 34.0 pg    MCHC 33.2 32.0 - 36.0 g/dL    RDW 12.2 11.5 - 14.5 %    Platelets 262 150 - 450 x10*3/uL   Comprehensive metabolic panel   Result Value Ref Range    Glucose 138 (H) 74 - 99 mg/dL    Sodium 137 136 - 145 mmol/L    Potassium 4.5 3.5 - 5.3 mmol/L    Chloride 102 98 - 107 mmol/L    Bicarbonate 28 21 - 32 mmol/L    Anion Gap 12 10 - 20 mmol/L    Urea Nitrogen 14 6 - 23 mg/dL    Creatinine 0.61 0.50 - 1.05 mg/dL    eGFR >90 >60 mL/min/1.73m*2    Calcium 9.0 8.6 - 10.3 mg/dL    Albumin 3.7 3.4 - 5.0 g/dL    Alkaline Phosphatase 57 33 - 110 U/L    Total Protein 6.2 (L) 6.4 - 8.2 g/dL    AST 28 9 - 39 U/L    Bilirubin, Total 0.8 0.0 - 1.2 mg/dL    ALT 33 7 - 45 U/L   US gallbladder    Result Date: 2/6/2024  Interpreted By:  Carroll Cox, STUDY: US GALLBLADDER  2/6/2024 1:06 pm   INDICATION: 47 y/o   F with  Signs/Symptoms:RUQ pain.   COMPARISON: CT abdomen and pelvis 02/06/2024   ACCESSION NUMBER(S): [id number]   ORDERING CLINICIAN: VIRGINIE STEEN   TECHNIQUE: Routine ultrasound of the right upper quadrant was performed.  Static images were obtained for remote interpretation.   FINDINGS: LIVER: Craniocaudal length:  19.3 cm,  enlarged Echogenicity:  Normal. Mass:  7.2 x 6.9 x 7.1 cm mixed echogenicity left hepatic lobe mass, compatible with a hemangioma seen on same-day CT. Mildly  increased echogenicity.   BILE DUCTS: Intrahepatic ducts: Non-dilated. Common bile duct diameter: 4 mm.   GALLBLADDER: Gallbladder:  Normal. Gallstones:  Present Gallbladder sludge:  None. Gallbladder wall thickening:  None. Pericholecystic fluid:  None. Negative sonographic Petit's sign.   PANCREAS:   Visualized portions are unremarkable.   RIGHT KIDNEY: Craniocaudal length:  10.3 cm,  within normal limits of size for age. No hydronephrosis, hydroureter or focal renal lesion.   PERITONEAL FLUID:   None seen in the right upper quadrant.       Hepatomegaly and mild steatosis. 7.2 cm left hepatic lobe hemangioma. Cholelithiasis without evidence of cholecystitis.   Signed by: Carroll Cox 2/6/2024 1:30 PM Dictation workstation:   [id number]    CT abdomen pelvis w IV contrast    Result Date: 2/6/2024  Interpreted By:  Mona Neff, STUDY: CT ABDOMEN PELVIS W IV CONTRAST;  2/6/2024 10:01 am   INDICATION: 47 y/o   F with  Signs/Symptoms:RUQ, right flank, and epigastric pain, nausea vomiting diarrhea.   LIMITATIONS: None.   ACCESSION NUMBER(S): [id number]   ORDERING CLINICIAN: VIRGINIE STEEN   TECHNIQUE: After the administration of IV iodonated contrast, spiral axial images were obtained from the xiphoid down through the symphysis pubis. Sagittal and coronal reconstruction images were generated.   COMPARISON: None.   FINDINGS: Lower Chest: Streaky bibasilar atelectasis. Small hiatal hernia.   Liver: Lesion left lateral segment of the liver measuring 8.9 x 7.1 cm with discontinuous peripheral nodular enhancement suggestive of hemangioma.   Gallbladder and Biliary: Unremarkable.   Pancreas: No abnormality identified in the pancreas.   Spleen: No abnormality identified in the spleen.   Adrenals: No abnormality identified in either adrenal gland.   Urinary: No parenchymal abnormality identified in either kidney. No hydronephrosis.   Reproductive: Status post hysterectomy.   Gastrointestinal/Peritoneum: No small  or large bowel obstruction in the visualized abdomen. In the abdomen, there is no extraluminal air. No significant free fluid. Gastric sleeve. No evidence of acute appendicitis.   Vascular: Abdominal aorta is normal in caliber.Retroaortic left renal vein.   Lymphatics: No enlarged lymph nodes by size criteria.   MSK/Body Wall: No aggressive bony lesion identified.       No acute abnormality.   Hepatic hemangioma.   Signed by: Mona Neff 2/6/2024 10:43 AM Dictation workstation:   [id number]    ECG 12 lead    Result Date: 2/6/2024  Normal sinus rhythm Normal ECG When compared with ECG of 30-JAN-2023 08:22, Criteria for Septal infarct are no longer Present Non-specific change in ST segment in Anterior leads T wave inversion no longer evident in Anterior leads    US abdomen    Result Date: 1/15/2024  Interpreted By:  Joseph Carlson, STUDY: US ABDOMEN;  1/13/2024 11:30 am   INDICATION: Signs/Symptoms:epigastric pain.   COMPARISON: None.   ACCESSION NUMBER(S): [id number]   ORDERING CLINICIAN: MARIAELENA GUERRA   TECHNIQUE: Multiple images of the right upper quadrant were obtained.  Color Doppler was also performed .   FINDINGS: PANCREAS: The pancreatic tail is not well visualized due to overlying bowel, otherwise the visualized pancreatic parenchyma appeared homogeneous. There is no ductal dilatation .   LIVER: An approximate 5.9 x 4.3 x 5.7 cm area of slight heterogeneous but overall hyperechogenicity is seen within the left liver lobe. This is probably a somewhat atypical hemangioma given the heterogeneity. However, given other lesions including abscess, malignancy etc. Are possible, CT hepatic protocol would be recommended for confirmation and further evaluation. There is mild diffuse heterogeneity of the liver probably due to geographic fatty infiltration, and 2 cm cyst at the left liver lobe, but again further evaluation would be recommended. The liver measures  15.6 cm in length.   GALLBLADDER: The gallbladder  wall is borderline measuring 3 mm. The technician noted tenderness at the area when scanning. If there is clinical suspicion of acute cholecystitis radionuclide imaging for further evaluation should be considered.   BILIARY TREE: No intra or extrahepatic biliary dilatation is identified. The common bile duct measures 7 mm.   RIGHT KIDNEY: The right kidney is normal in size, measuring 11 cm in craniocaudal dimension. The renal cortical echogenicity and thickness are within normal limits. No hydronephrosis or renal calculi are evident.   LEFT KIDNEY: The left kidney was poorly visualized particularly at the poles, due to overlying bowel gas. Considering this it appears to measure approximately 11 cm in length, otherwise grossly unremarkable without obvious hydronephrosis or calculi.   SPLEEN: Homogeneous parenchyma and within normal size limits measuring  11 cm in length.   OTHER: No free fluid The bladder contour is smooth.   VESSELS: Hepatopetal blood flow  and patency of the hepatic veins by color doppler. Aorta: Unremarkable, measuring up to 2.2 cm in diameter at the proximal segment. IVC: Patent       Probable hepatic hemangioma and geographic fatty infiltration. However, further evaluation such as with CT hepatic protocol would be recommended. Arslan gallbladder with borderline wall thickness and mild pain with scanning. Suboptimal visualization of the left kidney, and pancreas.   Signed by: Joseph Carlson 1/15/2024 12:21 PM Dictation workstation:   [id number]                    Assessment/Plan   Principal Problem:    Right upper quadrant abdominal pain  Active Problems:    Calculus of gallbladder without cholecystitis without obstruction    This is a 48 y.o. female with PMH of anxiety/depression, sciatica, pyelonephritis, obesity status post gastric sleeve surgery 2021, HTN, GERD, hepatic hemangioma, ADD, presenting due to a ~3-month history of epigastric/RUQ/flank pain which has been worsening in frequency and  severity. She describes pain as dull and occasionally shooting, often associated with nausea and occasional SOB due to severity, worse after eating solids.   Pt also reports worsening heartburn x 2 weeks, has been complaint with PPI BID, last EGD in 2023 - small hiatal hernia. Denies NSAIDs, melena.   She denies personal hx of CAD or VTE/PE.   Workup with stable labs including LFTs, unremarkable UA, stable EKG, CT abdomen/pelvis with contrast showed a known liver hemangioma, gallbladder ultrasound with cholelithiasis, mild liver steatosis.  Patient given IVF, analgesics/antiemetics, admitted for further care.  Seen by general surgery, and for HIDA and possible cholecystectomy     Epigastric pain/ RUQ pain  -cholelithiasis, concern for early cholecystitis   -RUQ US: cholelithiasis   -Lap jono yesterday: adhesions to gallbladder consistent with chronic inflammation   -LFT's WNL  -no leukocytosis  -UA neg   -Did have PVR completed for reported difficulty urinating: PVR 26, no retention     DVT prophylaxis:  Held for HIDA/ potential procedure, SCD    DC plan:  DC home when medically stable    Labs/Testing reviewed    Interdisciplinary team rounding completed with hospitalist, nurse, TCC    NP discussed plan and lab/testing results with Dr. Malloy . Surgery signed off. Will check 1 more PVR, if no retention can DC        I spent 50 minutes in the professional and overall care of this patient.      Arcelia Adair, NIRANJAN-CNP

## 2024-02-08 NOTE — POST-PROCEDURE NOTE
Ms. Jason is a 48 year old female who is POD #0 from a laparoscopic cholecystectomy (Intraoperative Findings: Omental adhesions to the gallbladder consistent with chronic inflammation). She is endorsing abdominal discomfort/bloating post-operatively. She endorses nausea without vomiting. She denies any fevers or chills. She states she has not urinated since OR.    PE:  Constitutional: A&Ox3, calm and cooperative, NAD.  Eyes: PERRL, clear sclera.  ENMT: Moist mucous membranes.  Head/Neck: Neck supple.  Cardiovascular: Normal rate and regular rhythm.   Respiratory/Thorax: Unlabored breathing.  Gastrointestinal: Abdomen slightly distended, soft, appropriately tender to palpation, no peritoneal signs, laparotomy sites c/d/i, well approximated with Dermabond, no erythema or drainage.  Genitourinary: Awaiting post-op void.  Musculoskeletal: ROM intact.  Neurological: A&Ox3, No focal deficits.  Psychological: Appropriate mood and behavior.  Skin: Warm and dry.    Radiology and labs reviewed. VSS, afebrile.    Plan:   - Diet: 40 g fat  - IVF  - Continue current pain regimen   - PRN antiemetic   - DVT Proph: SCDs/ ambulate/ Lovenox  - Bowel regimen: Miralax  - Monitor VS every 4 hours   - Labs ordered for AM   - IS every hour while awake   - Encourage ambulation / OOB as tolerated   - Monitor lap sites for drainage, erythema, excessive bruising   - Continue supportive care  - F/u with Dr. Flanagan outpatient in 10-14 days once d/c from hospital     Dispo: Pain and nausea control. OOB as able. Awaiting post-op urinary void.

## 2024-02-08 NOTE — CARE PLAN
The patient's goals for the shift include      The clinical goals for the shift include Patient bowel function will improve throughout shift    Problem: Pain  Goal: Takes deep breaths with improved pain control throughout the shift  Outcome: Met  Goal: Turns in bed with improved pain control throughout the shift  Outcome: Met  Goal: Walks with improved pain control throughout the shift  Outcome: Met  Goal: Performs ADL's with improved pain control throughout shift  Outcome: Met  Goal: Participates in PT with improved pain control throughout the shift  Outcome: Met  Goal: Free from opioid side effects throughout the shift  Outcome: Met  Goal: Free from acute confusion related to pain meds throughout the shift  Outcome: Met

## 2024-02-08 NOTE — PROGRESS NOTES
"Gonzalez Jason is a 48 y.o. female on day 0 of admission presenting with Right upper quadrant abdominal pain.    Subjective   Patient is awake in bed this morning. She reports having mild nausea and some concern not being able to pee. She reports eating some of her breakfast.        Objective     Physical Exam  Constitutional:       Appearance: Normal appearance.   Cardiovascular:      Rate and Rhythm: Normal rate and regular rhythm.   Pulmonary:      Effort: Pulmonary effort is normal.      Comments: Non labored breathing on RA  Abdominal:      General: There is distension.      Palpations: Abdomen is soft.      Tenderness: There is no abdominal tenderness.      Comments: Lap sites C/D/I, edges well approximated, covered in Dermabond.   Genitourinary:     Comments: Voiding independently I bladder scanned 26mL after patient voided at around 0830  Skin:     General: Skin is warm and dry.   Neurological:      General: No focal deficit present.      Mental Status: She is alert and oriented to person, place, and time. Mental status is at baseline.   Psychiatric:         Attention and Perception: Attention normal.         Mood and Affect: Mood normal.         Speech: Speech normal.         Behavior: Behavior normal.         Cognition and Memory: Cognition normal.         Last Recorded Vitals  Blood pressure 137/85, pulse 104, temperature 36.8 °C (98.2 °F), resp. rate 17, height 1.753 m (5' 9\"), weight 120 kg (265 lb), SpO2 99 %.  Intake/Output last 3 Shifts:  I/O last 3 completed shifts:  In: 3216.7 (26.8 mL/kg) [P.O.:150; I.V.:2966.7 (24.7 mL/kg); IV Piggyback:100]  Out: 270 (2.2 mL/kg) [Urine:250 (0.1 mL/kg/hr); Blood:20]  Weight: 120.2 kg     Relevant Results  Scheduled medications  amphetamine-dextroamphetamine, 10 mg, oral, BID after breakfast and lunch  DULoxetine, 60 mg, oral, Daily  enoxaparin, 40 mg, subcutaneous, q24h  pantoprazole, 40 mg, oral, BID  polyethylene glycol, 17 g, oral, Daily      Continuous " medications  lactated Ringer's, 75 mL/hr, Last Rate: 75 mL/hr (02/08/24 0603)      PRN medications  PRN medications: acetaminophen **OR** acetaminophen **OR** acetaminophen, alum-mag hydroxide-simeth, dextromethorphan-guaifenesin, ketorolac, melatonin, morphine, ondansetron ODT **OR** ondansetron, traZODone  Results for orders placed or performed during the hospital encounter of 02/06/24 (from the past 24 hour(s))   CBC   Result Value Ref Range    WBC 11.0 4.4 - 11.3 x10*3/uL    nRBC 0.0 0.0 - 0.0 /100 WBCs    RBC 4.66 4.00 - 5.20 x10*6/uL    Hemoglobin 13.8 12.0 - 16.0 g/dL    Hematocrit 41.6 36.0 - 46.0 %    MCV 89 80 - 100 fL    MCH 29.6 26.0 - 34.0 pg    MCHC 33.2 32.0 - 36.0 g/dL    RDW 12.2 11.5 - 14.5 %    Platelets 262 150 - 450 x10*3/uL   Comprehensive metabolic panel   Result Value Ref Range    Glucose 138 (H) 74 - 99 mg/dL    Sodium 137 136 - 145 mmol/L    Potassium 4.5 3.5 - 5.3 mmol/L    Chloride 102 98 - 107 mmol/L    Bicarbonate 28 21 - 32 mmol/L    Anion Gap 12 10 - 20 mmol/L    Urea Nitrogen 14 6 - 23 mg/dL    Creatinine 0.61 0.50 - 1.05 mg/dL    eGFR >90 >60 mL/min/1.73m*2    Calcium 9.0 8.6 - 10.3 mg/dL    Albumin 3.7 3.4 - 5.0 g/dL    Alkaline Phosphatase 57 33 - 110 U/L    Total Protein 6.2 (L) 6.4 - 8.2 g/dL    AST 28 9 - 39 U/L    Bilirubin, Total 0.8 0.0 - 1.2 mg/dL    ALT 33 7 - 45 U/L     NM hepatobiliary w cholecystokinin   Final Result   Normal hepatobiliary imaging with no sign of cystic duct obstruction   or biliary dyskinesia.        I personally reviewed the images/study, and I agree with the findings   as stated above. This study was interpreted at [place], [place].        MACRO:   None        Signed by: Dexter Wright 2/7/2024 12:15 PM   Dictation workstation:   [id number]      US gallbladder   Final Result   Hepatomegaly and mild steatosis.   7.2 cm left hepatic lobe hemangioma.   Cholelithiasis without evidence of cholecystitis.         Signed by: Carroll Cox 2/6/2024 1:30 PM   Dictation workstation:   [id number]      CT abdomen pelvis w IV contrast   Final Result   No acute abnormality.        Hepatic hemangioma.        Signed by: Mona Neff 2/6/2024 10:43 AM   Dictation workstation:   [id number]              Assessment/Plan   Principal Problem:    Right upper quadrant abdominal pain  Active Problems:    Calculus of gallbladder without cholecystitis without obstruction    Plan: POD #1 Cholecystectomy Laparoscopy Findings: Omental adhesions to the gallbladder consistent with chronic inflammation   - Low fat diet  - PRN pain control  - PRN antiemetic  - Encourage OOB  - Encourage IS  - DVT prophylaxis SCDs/ Lovenox    Dispo: Patient recovering well post-operatively. Surgery to sign off. Okay for discharge when medically cleared. Follow up outpatient with Dr. Flanagan in 3-4 weeks. Discussed plan with Dr. Flanagan         I spent 35 minutes in the professional and overall care of this patient.      Margarito Helm PA-C

## 2024-02-09 ENCOUNTER — PATIENT OUTREACH (OUTPATIENT)
Dept: PRIMARY CARE | Facility: CLINIC | Age: 49
End: 2024-02-09
Payer: COMMERCIAL

## 2024-02-09 NOTE — PROGRESS NOTES
Discharge Facility: [facility]  Discharge Diagnosis: abd pain, Chol Lap  Admission Date: 6 Feb 24  Discharge Date: 8 Feb 24    PCP Appointment Date: 20 Feb 24  Specialist Appointment Date: 5 Mar 24 (Gen [facility])   Hospital Encounter and Summary: Linked    See discharge assessment below for further details     Engagement  Call Start Time: 1052 (2/9/2024 11:02 AM)    Medications  Medications reviewed with patient/caregiver?: Yes (2/9/2024 11:02 AM)  Is the patient having any side effects they believe may be caused by any medication additions or changes?: No (2/9/2024 11:02 AM)  Does the patient have all medications ordered at discharge?: Yes (2/9/2024 11:02 AM)  Prescription Comments: None (2/9/2024 11:02 AM)  Is the patient taking all medications as directed (includes completed medication regime)?: Yes (2/9/2024 11:02 AM)  Medication Comments: None (2/9/2024 11:02 AM)    Appointments  Does the patient have a primary care provider?: Yes (follow up made by me for 20 Feb 24) (2/9/2024 11:02 AM)  Care Management Interventions: Verified appointment date/time/provider (2/9/2024 11:02 AM)  Has the patient kept scheduled appointments due by today?: Yes (2/9/2024 11:02 AM)  Care Management Interventions: Advised patient to keep appointment (2/9/2024 11:02 AM)    Self Management  What is the home health agency?: N/A (2/9/2024 11:02 AM)  Has home health visited the patient within 72 hours of discharge?: Not applicable (2/9/2024 11:02 AM)  What Durable Medical Equipment (DME) was ordered?: N/A (2/9/2024 11:02 AM)    Patient Teaching  Does the patient have access to their discharge instructions?: Yes (2/9/2024 11:02 AM)  Care Management Interventions: Reviewed instructions with patient (2/9/2024 11:02 AM)  What is the patient's perception of their health status since discharge?: Improving (2/9/2024 11:02 AM)  Is the patient/caregiver able to teach back the hierarchy of who to call/visit for symptoms/problems? PCP, Specialist,  Home Health nurse, Urgent Care, ED, 911: Yes (2/9/2024 11:02 AM)  Patient/Caregiver Education Comments: None (2/9/2024 11:02 AM)    Wrap Up  Wrap Up Additional Comments: None (2/9/2024 11:02 AM)  Call End Time: 1102 (2/9/2024 11:02 AM)     Pt grateful for outreach call and is agreeable to being contacted after PCP appt to see how she is doing.

## 2024-02-09 NOTE — ANESTHESIA POSTPROCEDURE EVALUATION
Patient: Gonzalez Jason    Procedure Summary       Date: 02/07/24 Room / Location: [facility] OR 04 / Virtual [facility] OR    Anesthesia Start: 1557 Anesthesia Stop: 1810    Procedure: Cholecystectomy Laparoscopy Diagnosis:       Right upper quadrant abdominal pain      (Right upper quadrant abdominal pain [R10.11])    Surgeons: Chilo Flanagan MD Responsible Provider: Canelo Ervin MD    Anesthesia Type: general ASA Status: 2            Anesthesia Type: general    Vitals Value Taken Time   /81 02/07/24 1945   Temp 36.3 °C (97.3 °F) 02/07/24 1945   Pulse 77 02/07/24 1945   Resp 15 02/07/24 1945   SpO2 95 % 02/07/24 1945       Anesthesia Post Evaluation    Patient location during evaluation: PACU  Patient participation: complete - patient participated  Level of consciousness: awake and alert  Pain management: adequate  Airway patency: patent  Cardiovascular status: acceptable and hemodynamically stable  Respiratory status: acceptable, spontaneous ventilation and nonlabored ventilation  Hydration status: acceptable  Postoperative Nausea and Vomiting: none        No notable events documented.

## 2024-02-12 LAB
LABORATORY COMMENT REPORT: NORMAL
PATH REPORT.FINAL DX SPEC: NORMAL
PATH REPORT.GROSS SPEC: NORMAL
PATH REPORT.RELEVANT HX SPEC: NORMAL
PATH REPORT.TOTAL CANCER: NORMAL

## 2024-02-20 ENCOUNTER — OFFICE VISIT (OUTPATIENT)
Dept: PRIMARY CARE | Facility: CLINIC | Age: 49
End: 2024-02-20
Payer: COMMERCIAL

## 2024-02-20 VITALS
HEIGHT: 69 IN | WEIGHT: 285 LBS | DIASTOLIC BLOOD PRESSURE: 80 MMHG | SYSTOLIC BLOOD PRESSURE: 116 MMHG | BODY MASS INDEX: 42.21 KG/M2

## 2024-02-20 DIAGNOSIS — K81.9 CHOLECYSTITIS: Primary | ICD-10-CM

## 2024-02-20 PROCEDURE — 3008F BODY MASS INDEX DOCD: CPT | Performed by: INTERNAL MEDICINE

## 2024-02-20 PROCEDURE — 3079F DIAST BP 80-89 MM HG: CPT | Performed by: INTERNAL MEDICINE

## 2024-02-20 PROCEDURE — 1036F TOBACCO NON-USER: CPT | Performed by: INTERNAL MEDICINE

## 2024-02-20 PROCEDURE — 99213 OFFICE O/P EST LOW 20 MIN: CPT | Performed by: INTERNAL MEDICINE

## 2024-02-20 PROCEDURE — 3074F SYST BP LT 130 MM HG: CPT | Performed by: INTERNAL MEDICINE

## 2024-02-20 ASSESSMENT — PATIENT HEALTH QUESTIONNAIRE - PHQ9
2. FEELING DOWN, DEPRESSED OR HOPELESS: NOT AT ALL
SUM OF ALL RESPONSES TO PHQ9 QUESTIONS 1 AND 2: 0
1. LITTLE INTEREST OR PLEASURE IN DOING THINGS: NOT AT ALL

## 2024-02-20 NOTE — PROGRESS NOTES
"Subjective   Patient ID: Gonzalez Jason is a 48 y.o. female who presents for Follow-up (From gallbladder surgery).    HPI     Note 1/12/24 reviewed  Status post uncomplicated cholecystectomy for acute cholecystitis.  Pathology revealed chronic mild cholecystitis.  Overall doing very well since discharge.  No nausea or vomiting.  No diarrhea.  Eating well.  Review of Systems    Objective   /80 (BP Location: Left arm, Patient Position: Sitting, BP Cuff Size: Large adult)   Ht 1.753 m (5' 9\")   Wt 129 kg (285 lb)   BMI 42.09 kg/m²     Physical Exam    Lab Results   Component Value Date    WBC 11.0 02/08/2024    HGB 13.8 02/08/2024    HCT 41.6 02/08/2024     02/08/2024    CHOL 182 09/26/2023    TRIG 98 09/26/2023    HDL 53.0 09/26/2023    ALT 33 02/08/2024    AST 28 02/08/2024     02/08/2024    K 4.5 02/08/2024     02/08/2024    CREATININE 0.61 02/08/2024    BUN 14 02/08/2024    CO2 28 02/08/2024    TSH 1.12 08/29/2022    HGBA1C 5.2 06/01/2023       === 02/06/24 ===    CT ABDOMEN PELVIS W IV CONTRAST    - Impression -  No acute abnormality.    Hepatic hemangioma.    Signed by: Mona Neff 2/6/2024 10:43 AM  Dictation workstation:   [id number]    Assessment/Plan     Status postcholecystectomy for cholecystitis.  Doing quite well.  Follow-up with surgery.  Gradually advance diet.     "

## 2024-02-21 ENCOUNTER — PATIENT OUTREACH (OUTPATIENT)
Dept: PRIMARY CARE | Facility: CLINIC | Age: 49
End: 2024-02-21
Payer: COMMERCIAL

## 2024-02-21 NOTE — PROGRESS NOTES
Call regarding appt. with PCP on (20 Feb 24) after hospitalization.  At time of outreach call the patient feels as if their condition has improved since last visit.  Reviewed the PCP appointment with the pt and addressed any questions or concerns.

## 2024-02-29 ENCOUNTER — TELEMEDICINE (OUTPATIENT)
Dept: BEHAVIORAL HEALTH | Facility: CLINIC | Age: 49
End: 2024-02-29
Payer: COMMERCIAL

## 2024-02-29 DIAGNOSIS — F90.0 ATTENTION DEFICIT HYPERACTIVITY DISORDER (ADHD), PREDOMINANTLY INATTENTIVE TYPE: ICD-10-CM

## 2024-02-29 DIAGNOSIS — F98.8 ATTENTION DEFICIT DISORDER (ADD) WITHOUT HYPERACTIVITY: ICD-10-CM

## 2024-02-29 PROCEDURE — 3008F BODY MASS INDEX DOCD: CPT | Performed by: PSYCHIATRY & NEUROLOGY

## 2024-02-29 PROCEDURE — 1036F TOBACCO NON-USER: CPT | Performed by: PSYCHIATRY & NEUROLOGY

## 2024-02-29 PROCEDURE — 99213 OFFICE O/P EST LOW 20 MIN: CPT | Performed by: PSYCHIATRY & NEUROLOGY

## 2024-02-29 RX ORDER — DEXTROAMPHETAMINE SACCHARATE, AMPHETAMINE ASPARTATE MONOHYDRATE, DEXTROAMPHETAMINE SULFATE AND AMPHETAMINE SULFATE 7.5; 7.5; 7.5; 7.5 MG/1; MG/1; MG/1; MG/1
30 CAPSULE, EXTENDED RELEASE ORAL EVERY MORNING
Qty: 30 CAPSULE | Refills: 0 | Status: SHIPPED | OUTPATIENT
Start: 2024-04-29 | End: 2024-05-29

## 2024-02-29 RX ORDER — DEXTROAMPHETAMINE SACCHARATE, AMPHETAMINE ASPARTATE MONOHYDRATE, DEXTROAMPHETAMINE SULFATE AND AMPHETAMINE SULFATE 7.5; 7.5; 7.5; 7.5 MG/1; MG/1; MG/1; MG/1
30 CAPSULE, EXTENDED RELEASE ORAL EVERY MORNING
Qty: 30 CAPSULE | Refills: 0 | Status: SHIPPED | OUTPATIENT
Start: 2024-02-29 | End: 2024-03-30

## 2024-02-29 RX ORDER — DEXTROAMPHETAMINE SACCHARATE, AMPHETAMINE ASPARTATE MONOHYDRATE, DEXTROAMPHETAMINE SULFATE AND AMPHETAMINE SULFATE 7.5; 7.5; 7.5; 7.5 MG/1; MG/1; MG/1; MG/1
30 CAPSULE, EXTENDED RELEASE ORAL EVERY MORNING
Qty: 30 CAPSULE | Refills: 0 | Status: SHIPPED | OUTPATIENT
Start: 2024-03-30 | End: 2024-04-29

## 2024-02-29 ASSESSMENT — ENCOUNTER SYMPTOMS: PSYCHIATRIC NEGATIVE: 1

## 2024-02-29 NOTE — PROGRESS NOTES
Gonzalez Jason is a 48 year old female who presented to ER on February 6, 2024  with RUQ pain off and on for 3 months. She reported that her pain was worsening and over the past 2-3 weeks has been more constant with associated nausea/vomiting.  She was taken to the operating room on February 7, 2024.    2/07/2024 Operation:  Laparoscopic Cholecystectomy  Pathology:  GALLBLADDER, CHOLECYSTECTOMY:  - MILD CHRONIC CHOLECYSTITIS WITH PARTIAL MUCOSAL AUTOLYSIS AND CHOLELITHIASIS.       Review of Systems  Constitutional: Negative for fever, chills, anorexia, weight loss, malaise     ENMT: Negative for nasal discharge, congestion, ear pain, mouth pain, throat pain     Respiratory: Negative for cough, hemoptysis, wheezing, shortness of breath     Cardiac: Negative for chest pain, dyspnea on exertion, orthopnea, palpitations, syncope     Gastrointestinal: Negative for nausea, vomiting, diarrhea, constipation, abdominal pain,     Genitourinary: Negative for discharge, dysuria, flank pain, frequency, hematuria     Musculoskeletal: Negative for decreased ROM, pain, swelling, weakness     Neurological: Negative for dizziness, confusion, headache, seizures, syncope     Psychiatric: Negative for mood changes, anxiety, hallucinations, sleep changes, suicidal ideas     Skin: Negative for mass, pain, itching, rash, ulcer     Endocrine: Negative for heat intolerance, cold intolerance, excessive sweating, polyuria, excess thirst     Hematologic/Lymph: Negative for anemia, bruising, easy bleeding, night sweats, petechiae, history of DVT/PE or cancer     Allergic/Immunologic: Negative for anaphylaxis, itchy/ teary eyes, itching, sneezing, swelling      Physical Exam    {Assess/Plan SmartLinks (Optional):70467}

## 2024-02-29 NOTE — PROGRESS NOTES
Subjective   Patient ID: Gonzalez Jason is a 48 y.o. female who presents for medication management visit..  HPI patient seen interval psych history reviewed.  Patient reports doing well on current med regimen.  Denies any adverse events.  Feels that the 20 mg Adderall XR no longer helping her optimally throughout the day with follow-through and focus.  Review of Systems   Psychiatric/Behavioral: Negative.         Objective   Physical Exam  Psychiatric:         Attention and Perception: Attention and perception normal.         Mood and Affect: Mood and affect normal.         Speech: Speech normal.         Behavior: Behavior normal. Behavior is cooperative.         Thought Content: Thought content normal.         Cognition and Memory: Cognition and memory normal.         Judgment: Judgment normal.         Assessment/Plan   Problem List Items Addressed This Visit             ICD-10-CM    Attention deficit hyperactivity disorder (ADHD) F90.9     Increase Adderall XR to 30 mg in the morning.  Follow-up in 3 months          Other Visit Diagnoses         Codes    Attention deficit disorder (ADD) without hyperactivity     F98.8    Relevant Medications    amphetamine-dextroamphetamine XR (Adderall XR) 30 mg 24 hr capsule    amphetamine-dextroamphetamine XR (Adderall XR) 30 mg 24 hr capsule (Start on 3/30/2024)    amphetamine-dextroamphetamine XR (Adderall XR) 30 mg 24 hr capsule (Start on 4/29/2024)                 Aman Perdomo MD 02/29/24 6:05 PM

## 2024-03-05 ENCOUNTER — APPOINTMENT (OUTPATIENT)
Dept: SURGERY | Facility: CLINIC | Age: 49
End: 2024-03-05
Payer: COMMERCIAL

## 2024-03-07 ENCOUNTER — PATIENT OUTREACH (OUTPATIENT)
Dept: PRIMARY CARE | Facility: CLINIC | Age: 49
End: 2024-03-07
Payer: COMMERCIAL

## 2024-04-10 ENCOUNTER — OFFICE VISIT (OUTPATIENT)
Dept: PRIMARY CARE | Facility: CLINIC | Age: 49
End: 2024-04-10
Payer: COMMERCIAL

## 2024-04-10 VITALS — DIASTOLIC BLOOD PRESSURE: 80 MMHG | WEIGHT: 288 LBS | SYSTOLIC BLOOD PRESSURE: 120 MMHG | BODY MASS INDEX: 42.53 KG/M2

## 2024-04-10 DIAGNOSIS — G47.30 SLEEP APNEA, UNSPECIFIED TYPE: ICD-10-CM

## 2024-04-10 DIAGNOSIS — F41.9 ANXIETY AND DEPRESSION: ICD-10-CM

## 2024-04-10 DIAGNOSIS — E66.01 CLASS 3 SEVERE OBESITY WITH SERIOUS COMORBIDITY AND BODY MASS INDEX (BMI) OF 40.0 TO 44.9 IN ADULT, UNSPECIFIED OBESITY TYPE (MULTI): Primary | ICD-10-CM

## 2024-04-10 DIAGNOSIS — E66.01 CLASS 3 SEVERE OBESITY WITH SERIOUS COMORBIDITY AND BODY MASS INDEX (BMI) OF 40.0 TO 44.9 IN ADULT, UNSPECIFIED OBESITY TYPE (MULTI): ICD-10-CM

## 2024-04-10 DIAGNOSIS — I10 ESSENTIAL HYPERTENSION: Chronic | ICD-10-CM

## 2024-04-10 DIAGNOSIS — D18.03 HEMANGIOMA OF LIVER: ICD-10-CM

## 2024-04-10 DIAGNOSIS — E88.810 DYSMETABOLIC SYNDROME: ICD-10-CM

## 2024-04-10 DIAGNOSIS — F32.A ANXIETY AND DEPRESSION: ICD-10-CM

## 2024-04-10 DIAGNOSIS — F90.0 ATTENTION DEFICIT HYPERACTIVITY DISORDER (ADHD), PREDOMINANTLY INATTENTIVE TYPE: ICD-10-CM

## 2024-04-10 PROCEDURE — 3008F BODY MASS INDEX DOCD: CPT | Performed by: INTERNAL MEDICINE

## 2024-04-10 PROCEDURE — 3079F DIAST BP 80-89 MM HG: CPT | Performed by: INTERNAL MEDICINE

## 2024-04-10 PROCEDURE — 3074F SYST BP LT 130 MM HG: CPT | Performed by: INTERNAL MEDICINE

## 2024-04-10 PROCEDURE — 1036F TOBACCO NON-USER: CPT | Performed by: INTERNAL MEDICINE

## 2024-04-10 PROCEDURE — 99214 OFFICE O/P EST MOD 30 MIN: CPT | Performed by: INTERNAL MEDICINE

## 2024-04-10 RX ORDER — TIRZEPATIDE 2.5 MG/.5ML
2.5 INJECTION, SOLUTION SUBCUTANEOUS
Qty: 2 ML | Refills: 2 | Status: SHIPPED | OUTPATIENT
Start: 2024-04-14 | End: 2024-07-13

## 2024-04-10 ASSESSMENT — PATIENT HEALTH QUESTIONNAIRE - PHQ9
2. FEELING DOWN, DEPRESSED OR HOPELESS: NOT AT ALL
1. LITTLE INTEREST OR PLEASURE IN DOING THINGS: NOT AT ALL
SUM OF ALL RESPONSES TO PHQ9 QUESTIONS 1 AND 2: 0

## 2024-04-10 NOTE — PROGRESS NOTES
Subjective   Patient ID: Gonzalez Jason is a 48 y.o. female who presents for Follow-up (Weight / lymphedema).    HPI     Review of Systems    Objective   /80 (BP Location: Right arm, Patient Position: Sitting, BP Cuff Size: Large adult)   Wt 131 kg (288 lb)   BMI 42.53 kg/m²     Physical Exam    Lab Results   Component Value Date    WBC 11.0 02/08/2024    HGB 13.8 02/08/2024    HCT 41.6 02/08/2024     02/08/2024    CHOL 182 09/26/2023    TRIG 98 09/26/2023    HDL 53.0 09/26/2023    ALT 33 02/08/2024    AST 28 02/08/2024     02/08/2024    K 4.5 02/08/2024     02/08/2024    CREATININE 0.61 02/08/2024    BUN 14 02/08/2024    CO2 28 02/08/2024    TSH 1.12 08/29/2022    HGBA1C 5.2 06/01/2023       Assessment/Plan       #1 obesity- reviewed.  Diet and exercise reviewed.  Spent significant time.  Will begin low-dose at pounds.  Reviewed use risks and side effects.  #2 s/p sleeve surgery- 2021. signif wt loss, now trending back up.. follows w/ surgery in [place].  Repeat labs next visit  #3 IFBS-good on last test. Patient will obtain labs. Continue lifestyle.  Labs at follow-up  #4 htn-good without treatment and surgery  #5 GERD-controlled with PPI.  #6 depression/anxiety-significant increase stress.  Overall doing well.  But certainly an issue.  No significant benefit from increase venlafaxine.  Reduce back to 150.  Follow as we add trazodone for insomnia.  Reviewed use risk and side effects.  Did discuss risks of serotonin syndrome.  #7 hepatic hemangioma- ultrasound last year in [place] with reduced size. Per hepatobiliary surgery no follow-up recommended. Will consider imaging next visit.  #8 ADD-follow-up psychiatry  #9 fatigue-positive snoring.  Significant fatigue with nonrefreshing sleep.  Will check home sleep study.    [place] 2023.  lipids- will bring labs next visit (+fhx CAD). c/s repeat CACS . Strong family history of CAD. Consider low-dose statin

## 2024-04-12 ENCOUNTER — APPOINTMENT (OUTPATIENT)
Dept: PRIMARY CARE | Facility: CLINIC | Age: 49
End: 2024-04-12
Payer: COMMERCIAL

## 2024-04-29 ENCOUNTER — CLINICAL SUPPORT (OUTPATIENT)
Dept: SLEEP MEDICINE | Facility: CLINIC | Age: 49
End: 2024-04-29
Payer: COMMERCIAL

## 2024-04-29 DIAGNOSIS — G47.30 SLEEP APNEA, UNSPECIFIED TYPE: ICD-10-CM

## 2024-04-29 DIAGNOSIS — G47.33 OBSTRUCTIVE SLEEP APNEA (ADULT) (PEDIATRIC): ICD-10-CM

## 2024-04-29 PROCEDURE — 95806 SLEEP STUDY UNATT&RESP EFFT: CPT | Performed by: INTERNAL MEDICINE

## 2024-04-29 NOTE — PROGRESS NOTES
Type of Study: HOME SLEEP STUDY - NOMAD     The patient received equipment and instructions for use of the [employer] Nomad HSAT device. The patient was instructed how to apply the effort belts, cannula, thermistor. It was also explained how the Nomad and oximeter components work.  The patient was asked to record their sleep for an 8-hour period.     The patient was informed of their responsibility for the device and acknowledged this by signing the HSAT device contract. The patient was asked to return the device on 04/30/2024 by 10 AM to the Respiratory Care Department at [facility].     The patient was instructed to call 911 as usual for any medical- emergencies while at home.  The patient was also given a phone number for troubleshooting when using the device in case there were additional questions.

## 2024-05-06 ENCOUNTER — PATIENT OUTREACH (OUTPATIENT)
Dept: PRIMARY CARE | Facility: CLINIC | Age: 49
End: 2024-05-06
Payer: COMMERCIAL

## 2024-05-08 ENCOUNTER — APPOINTMENT (OUTPATIENT)
Dept: PRIMARY CARE | Facility: CLINIC | Age: 49
End: 2024-05-08
Payer: COMMERCIAL

## 2024-05-29 ENCOUNTER — TELEMEDICINE (OUTPATIENT)
Dept: BEHAVIORAL HEALTH | Facility: CLINIC | Age: 49
End: 2024-05-29
Payer: COMMERCIAL

## 2024-05-29 DIAGNOSIS — F90.2 ATTENTION DEFICIT HYPERACTIVITY DISORDER (ADHD), COMBINED TYPE: ICD-10-CM

## 2024-05-29 DIAGNOSIS — F32.A ANXIETY AND DEPRESSION: ICD-10-CM

## 2024-05-29 DIAGNOSIS — G47.00 INSOMNIA DISORDER, WITH NON-SLEEP DISORDER MENTAL COMORBIDITY: ICD-10-CM

## 2024-05-29 DIAGNOSIS — F41.9 ANXIETY AND DEPRESSION: ICD-10-CM

## 2024-05-29 DIAGNOSIS — F98.8 ATTENTION DEFICIT DISORDER (ADD) WITHOUT HYPERACTIVITY: ICD-10-CM

## 2024-05-29 PROCEDURE — 99214 OFFICE O/P EST MOD 30 MIN: CPT | Performed by: PSYCHIATRY & NEUROLOGY

## 2024-05-29 PROCEDURE — 3008F BODY MASS INDEX DOCD: CPT | Performed by: PSYCHIATRY & NEUROLOGY

## 2024-05-29 RX ORDER — DEXTROAMPHETAMINE SACCHARATE, AMPHETAMINE ASPARTATE MONOHYDRATE, DEXTROAMPHETAMINE SULFATE AND AMPHETAMINE SULFATE 7.5; 7.5; 7.5; 7.5 MG/1; MG/1; MG/1; MG/1
30 CAPSULE, EXTENDED RELEASE ORAL EVERY MORNING
Qty: 30 CAPSULE | Refills: 0 | Status: SHIPPED | OUTPATIENT
Start: 2024-06-28 | End: 2024-07-28

## 2024-05-29 RX ORDER — ZOLPIDEM TARTRATE 5 MG/1
5 TABLET ORAL NIGHTLY PRN
Qty: 30 TABLET | Refills: 1 | Status: SHIPPED | OUTPATIENT
Start: 2024-05-29 | End: 2024-07-28

## 2024-05-29 RX ORDER — DEXTROAMPHETAMINE SACCHARATE, AMPHETAMINE ASPARTATE MONOHYDRATE, DEXTROAMPHETAMINE SULFATE AND AMPHETAMINE SULFATE 7.5; 7.5; 7.5; 7.5 MG/1; MG/1; MG/1; MG/1
30 CAPSULE, EXTENDED RELEASE ORAL EVERY MORNING
Qty: 30 CAPSULE | Refills: 0 | Status: SHIPPED | OUTPATIENT
Start: 2024-07-28 | End: 2024-08-27

## 2024-05-29 RX ORDER — DEXTROAMPHETAMINE SACCHARATE, AMPHETAMINE ASPARTATE MONOHYDRATE, DEXTROAMPHETAMINE SULFATE AND AMPHETAMINE SULFATE 7.5; 7.5; 7.5; 7.5 MG/1; MG/1; MG/1; MG/1
30 CAPSULE, EXTENDED RELEASE ORAL EVERY MORNING
Qty: 30 CAPSULE | Refills: 0 | Status: SHIPPED | OUTPATIENT
Start: 2024-05-29 | End: 2024-06-28

## 2024-05-29 ASSESSMENT — ENCOUNTER SYMPTOMS
NERVOUS/ANXIOUS: 1
SLEEP DISTURBANCE: 1

## 2024-05-29 NOTE — PROGRESS NOTES
Subjective   Patient ID: Gonzalez Jason is a 49 y.o. female who presents for medication management..  HPI patient reports high anxiety not sure how much is due to stressors including change at her job and his son who has autism and cancer.  She has been having more trouble sleeping lately and has tried some trazodone however this causes headaches and a hangover effect.  She is seeing her therapist every 1 to 2 weeks and is doing EMDR therapy.    Review of Systems   Psychiatric/Behavioral:  Positive for sleep disturbance. Negative for self-injury and suicidal ideas. The patient is nervous/anxious.        Objective   Physical Exam  Psychiatric:         Attention and Perception: Attention and perception normal.         Mood and Affect: Affect normal. Mood is anxious.         Speech: Speech normal.         Behavior: Behavior normal. Behavior is cooperative.         Thought Content: Thought content normal. Thought content does not include suicidal ideation. Thought content does not include suicidal plan.         Cognition and Memory: Cognition and memory normal.         Judgment: Judgment normal.         Assessment/Plan   Problem List Items Addressed This Visit             ICD-10-CM    Anxiety and depression F41.9, F32.A     Substitute Ambien 5 mg at at bedtime as needed for insomnia.  Discontinue trazodone.  Continue duloxetine for anxiety and depression.         Attention deficit hyperactivity disorder (ADHD) F90.9     Continue ongoing treatment with Adderall XR 30 mg daily to manage ADHD.  Follow-up 3 months          Other Visit Diagnoses         Codes    Attention deficit disorder (ADD) without hyperactivity     F98.8    Relevant Medications    amphetamine-dextroamphetamine XR (Adderall XR) 30 mg 24 hr capsule    amphetamine-dextroamphetamine XR (Adderall XR) 30 mg 24 hr capsule (Start on 6/28/2024)    amphetamine-dextroamphetamine XR (Adderall XR) 30 mg 24 hr capsule (Start on 7/28/2024)    Insomnia disorder, with  non-sleep disorder mental comorbidity     G47.00    Relevant Medications    zolpidem (Ambien) 5 mg tablet                 Aman Perdomo MD 05/29/24 7:22 PM

## 2024-05-29 NOTE — ASSESSMENT & PLAN NOTE
Substitute Ambien 5 mg at at bedtime as needed for insomnia.  Discontinue trazodone.  Continue duloxetine for anxiety and depression.

## 2024-07-16 ENCOUNTER — TELEPHONE (OUTPATIENT)
Dept: PRIMARY CARE | Facility: CLINIC | Age: 49
End: 2024-07-16
Payer: COMMERCIAL

## 2024-07-17 ENCOUNTER — TELEPHONE (OUTPATIENT)
Dept: PRIMARY CARE | Facility: CLINIC | Age: 49
End: 2024-07-17
Payer: COMMERCIAL

## 2024-08-13 ENCOUNTER — APPOINTMENT (OUTPATIENT)
Dept: PRIMARY CARE | Facility: CLINIC | Age: 49
End: 2024-08-13
Payer: COMMERCIAL

## 2024-08-13 VITALS — BODY MASS INDEX: 42.23 KG/M2 | DIASTOLIC BLOOD PRESSURE: 82 MMHG | WEIGHT: 286 LBS | SYSTOLIC BLOOD PRESSURE: 124 MMHG

## 2024-08-13 DIAGNOSIS — R73.01 IMPAIRED FASTING BLOOD SUGAR: ICD-10-CM

## 2024-08-13 DIAGNOSIS — Z00.00 WELL ADULT EXAM: Primary | ICD-10-CM

## 2024-08-13 DIAGNOSIS — I10 ESSENTIAL HYPERTENSION: ICD-10-CM

## 2024-08-13 DIAGNOSIS — E66.01 CLASS 3 SEVERE OBESITY WITHOUT SERIOUS COMORBIDITY WITH BODY MASS INDEX (BMI) OF 40.0 TO 44.9 IN ADULT, UNSPECIFIED OBESITY TYPE (MULTI): ICD-10-CM

## 2024-08-13 DIAGNOSIS — E88.810 DYSMETABOLIC SYNDROME: ICD-10-CM

## 2024-08-13 DIAGNOSIS — Z98.84 H/O GASTRIC BYPASS: ICD-10-CM

## 2024-08-13 PROCEDURE — 3079F DIAST BP 80-89 MM HG: CPT | Performed by: INTERNAL MEDICINE

## 2024-08-13 PROCEDURE — 3074F SYST BP LT 130 MM HG: CPT | Performed by: INTERNAL MEDICINE

## 2024-08-13 PROCEDURE — 99213 OFFICE O/P EST LOW 20 MIN: CPT | Performed by: INTERNAL MEDICINE

## 2024-08-13 RX ORDER — LANOLIN ALCOHOL/MO/W.PET/CERES
100 CREAM (GRAM) TOPICAL DAILY
Qty: 30 TABLET | Refills: 11 | Status: SHIPPED | OUTPATIENT
Start: 2024-08-13 | End: 2025-08-13

## 2024-08-13 NOTE — PROGRESS NOTES
Subjective   Patient ID: Gonzalez Jason is a 49 y.o. female who presents for weight /  back pain.    HPI     Review of Systems    Objective   There were no vitals taken for this visit.    Physical Exam      Lab Results   Component Value Date    WBC 11.0 02/08/2024    HGB 13.8 02/08/2024    HCT 41.6 02/08/2024     02/08/2024    CHOL 182 09/26/2023    TRIG 98 09/26/2023    HDL 53.0 09/26/2023    ALT 33 02/08/2024    AST 28 02/08/2024     02/08/2024    K 4.5 02/08/2024     02/08/2024    CREATININE 0.61 02/08/2024    BUN 14 02/08/2024    CO2 28 02/08/2024    TSH 1.12 08/29/2022    HGBA1C 5.2 06/01/2023       Assessment/Plan         #1 obesity- reviewed.  Diet and exercise reviewed.  Spent significant time.  Will begin low-dose at pounds.  Reviewed use risks and side effects.  #2 s/p sleeve surgery- 2021. signif wt loss, now trending back up.. follows w/ surgery in [place].  Repeat labs next visit  #3 IFBS-good on last test. Patient will obtain labs. Continue lifestyle.  Labs    #4 htn-good without treatment and surgery  #5 GERD-controlled with PPI.  #6 depression/anxiety-significant increase stress.  Overall doing well.  But certainly an issue.  No significant benefit from increase venlafaxine.  Reduce back to 150.  Follow as we add trazodone for insomnia.  Reviewed use risk and side effects.  Did discuss risks of serotonin syndrome.  #7 hepatic hemangioma-  Per hepatobiliary surgery no follow-up recommended. Will consider imaging next visit.  #8 ADD-follow-up psychiatry  #9 fatigue-positive snoring.  Significant fatigue with nonrefreshing sleep.  Will check home sleep study.  #10 LBP-     [place] 2023.  lipids- will bring labs next visit (+fhx CAD). c/s repeat CACS . Strong family history of CAD. Consider low-dose statin

## 2024-08-28 ENCOUNTER — APPOINTMENT (OUTPATIENT)
Dept: BEHAVIORAL HEALTH | Facility: CLINIC | Age: 49
End: 2024-08-28
Payer: COMMERCIAL

## 2024-08-28 DIAGNOSIS — F98.8 ATTENTION DEFICIT DISORDER (ADD) WITHOUT HYPERACTIVITY: ICD-10-CM

## 2024-08-28 DIAGNOSIS — F90.2 ATTENTION DEFICIT HYPERACTIVITY DISORDER (ADHD), COMBINED TYPE: ICD-10-CM

## 2024-08-28 DIAGNOSIS — F41.9 ANXIETY AND DEPRESSION: ICD-10-CM

## 2024-08-28 DIAGNOSIS — F32.A ANXIETY AND DEPRESSION: ICD-10-CM

## 2024-08-28 PROCEDURE — 99212 OFFICE O/P EST SF 10 MIN: CPT | Performed by: PSYCHIATRY & NEUROLOGY

## 2024-08-28 RX ORDER — DEXTROAMPHETAMINE SACCHARATE, AMPHETAMINE ASPARTATE MONOHYDRATE, DEXTROAMPHETAMINE SULFATE AND AMPHETAMINE SULFATE 7.5; 7.5; 7.5; 7.5 MG/1; MG/1; MG/1; MG/1
30 CAPSULE, EXTENDED RELEASE ORAL EVERY MORNING
Qty: 30 CAPSULE | Refills: 0 | Status: SHIPPED | OUTPATIENT
Start: 2024-08-28 | End: 2024-09-27

## 2024-08-28 RX ORDER — DEXTROAMPHETAMINE SACCHARATE, AMPHETAMINE ASPARTATE MONOHYDRATE, DEXTROAMPHETAMINE SULFATE AND AMPHETAMINE SULFATE 7.5; 7.5; 7.5; 7.5 MG/1; MG/1; MG/1; MG/1
30 CAPSULE, EXTENDED RELEASE ORAL EVERY MORNING
Qty: 30 CAPSULE | Refills: 0 | Status: SHIPPED | OUTPATIENT
Start: 2024-10-27 | End: 2024-11-26

## 2024-08-28 RX ORDER — DEXTROAMPHETAMINE SACCHARATE, AMPHETAMINE ASPARTATE MONOHYDRATE, DEXTROAMPHETAMINE SULFATE AND AMPHETAMINE SULFATE 7.5; 7.5; 7.5; 7.5 MG/1; MG/1; MG/1; MG/1
30 CAPSULE, EXTENDED RELEASE ORAL EVERY MORNING
Qty: 30 CAPSULE | Refills: 0 | Status: SHIPPED | OUTPATIENT
Start: 2024-09-27 | End: 2024-10-27

## 2024-08-28 ASSESSMENT — ENCOUNTER SYMPTOMS: PSYCHIATRIC NEGATIVE: 1

## 2024-08-28 NOTE — PROGRESS NOTES
Subjective   Patient ID: Gonzalez Jason is a 49 y.o. female who presents for medication management visit virtual. Virtual or Telephone Consent    An interactive audio and video telecommunication system which permits real time communications between the patient (at the originating site) and provider (at the distant site) was utilized to provide this telehealth service.   Verbal consent was requested and obtained from Gonzalez Jason on this date, 08/28/24 for a telehealth visit.    HPI patient seen interval psych history reviewed.  Patient reports that her medications are working well.  She seems to need the Adderall XR every day even on the weekends.  It is noticeable to  when she does not take it she becomes very scattered.  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  She cannot wait to retire from it.  Thinks the Cymbalta is working well except for the anxiety about the job during the day.    Review of Systems   Psychiatric/Behavioral: Negative.         Objective   Physical Exam  Psychiatric:         Attention and Perception: Attention and perception normal.         Mood and Affect: Mood and affect normal.         Speech: Speech normal.         Behavior: Behavior normal. Behavior is cooperative.         Thought Content: Thought content normal.         Cognition and Memory: Cognition and memory normal.         Judgment: Judgment normal.         Assessment/Plan   Problem List Items Addressed This Visit             ICD-10-CM    Anxiety and depression F41.9, F32.A     Continue Cymbalta 60 mg daily.  Follow-up 3 months         Attention deficit hyperactivity disorder (ADHD) F90.9     Continue Adderall XR 30 mg daily.  Follow-up 3 months          Other Visit Diagnoses         Codes    Attention deficit disorder (ADD) without hyperactivity     F98.8    Relevant Medications    amphetamine-dextroamphetamine XR (Adderall XR) 30 mg 24 hr capsule     amphetamine-dextroamphetamine XR (Adderall XR) 30 mg 24 hr capsule (Start on 9/27/2024)    amphetamine-dextroamphetamine XR (Adderall XR) 30 mg 24 hr capsule (Start on 10/27/2024)                 Aman Perdomo MD 08/28/24 11:48 AM

## 2024-09-04 ENCOUNTER — TELEPHONE (OUTPATIENT)
Dept: BEHAVIORAL HEALTH | Facility: CLINIC | Age: 49
End: 2024-09-04
Payer: COMMERCIAL

## 2024-09-04 DIAGNOSIS — F98.8 ATTENTION DEFICIT DISORDER (ADD) WITHOUT HYPERACTIVITY: ICD-10-CM

## 2024-09-04 RX ORDER — DEXTROAMPHETAMINE SACCHARATE, AMPHETAMINE ASPARTATE MONOHYDRATE, DEXTROAMPHETAMINE SULFATE, AMPHETAMINE SULFATE 6.25; 6.25; 6.25; 6.25 MG/1; MG/1; MG/1; MG/1
25 CAPSULE, EXTENDED RELEASE ORAL EVERY MORNING
Qty: 30 CAPSULE | Refills: 0 | Status: SHIPPED | OUTPATIENT
Start: 2024-09-04 | End: 2024-10-04

## 2024-10-18 ENCOUNTER — LAB (OUTPATIENT)
Dept: LAB | Facility: LAB | Age: 49
End: 2024-10-18
Payer: COMMERCIAL

## 2024-10-18 DIAGNOSIS — R74.8 ELEVATED LIVER ENZYMES: ICD-10-CM

## 2024-10-18 DIAGNOSIS — Z00.00 WELL ADULT EXAM: ICD-10-CM

## 2024-10-18 DIAGNOSIS — Z98.84 H/O GASTRIC BYPASS: ICD-10-CM

## 2024-10-18 DIAGNOSIS — R10.13 EPIGASTRIC PAIN: ICD-10-CM

## 2024-10-18 LAB
25(OH)D3 SERPL-MCNC: 26 NG/ML (ref 30–100)
ALBUMIN SERPL BCP-MCNC: 3.7 G/DL (ref 3.4–5)
ALP SERPL-CCNC: 63 U/L (ref 33–110)
ALT SERPL W P-5'-P-CCNC: 14 U/L (ref 7–45)
ANION GAP SERPL CALC-SCNC: 9 MMOL/L (ref 10–20)
AST SERPL W P-5'-P-CCNC: 13 U/L (ref 9–39)
BILIRUB DIRECT SERPL-MCNC: 0.2 MG/DL (ref 0–0.3)
BILIRUB SERPL-MCNC: 0.7 MG/DL (ref 0–1.2)
BUN SERPL-MCNC: 20 MG/DL (ref 6–23)
CALCIUM SERPL-MCNC: 8.6 MG/DL (ref 8.6–10.3)
CHLORIDE SERPL-SCNC: 105 MMOL/L (ref 98–107)
CHOLEST SERPL-MCNC: 182 MG/DL (ref 0–199)
CHOLESTEROL/HDL RATIO: 3
CO2 SERPL-SCNC: 32 MMOL/L (ref 21–32)
CREAT SERPL-MCNC: 0.57 MG/DL (ref 0.5–1.05)
EGFRCR SERPLBLD CKD-EPI 2021: >90 ML/MIN/1.73M*2
ERYTHROCYTE [DISTWIDTH] IN BLOOD BY AUTOMATED COUNT: 12.7 % (ref 11.5–14.5)
EST. AVERAGE GLUCOSE BLD GHB EST-MCNC: 108 MG/DL
FERRITIN SERPL-MCNC: 23 NG/ML (ref 8–150)
GLUCOSE SERPL-MCNC: 98 MG/DL (ref 74–99)
HBA1C MFR BLD: 5.4 %
HCT VFR BLD AUTO: 43.4 % (ref 36–46)
HDLC SERPL-MCNC: 60.3 MG/DL
HGB BLD-MCNC: 14.2 G/DL (ref 12–16)
IRON SATN MFR SERPL: 17 % (ref 25–45)
IRON SERPL-MCNC: 58 UG/DL (ref 35–150)
LDLC SERPL CALC-MCNC: 107 MG/DL
MCH RBC QN AUTO: 29.5 PG (ref 26–34)
MCHC RBC AUTO-ENTMCNC: 32.7 G/DL (ref 32–36)
MCV RBC AUTO: 90 FL (ref 80–100)
NON HDL CHOLESTEROL: 122 MG/DL (ref 0–149)
NRBC BLD-RTO: 0 /100 WBCS (ref 0–0)
PLATELET # BLD AUTO: 278 X10*3/UL (ref 150–450)
POTASSIUM SERPL-SCNC: 5 MMOL/L (ref 3.5–5.3)
PROT SERPL-MCNC: 6 G/DL (ref 6.4–8.2)
RBC # BLD AUTO: 4.82 X10*6/UL (ref 4–5.2)
SODIUM SERPL-SCNC: 141 MMOL/L (ref 136–145)
TIBC SERPL-MCNC: 343 UG/DL (ref 240–445)
TRIGL SERPL-MCNC: 75 MG/DL (ref 0–149)
TSH SERPL-ACNC: 1.94 MIU/L (ref 0.44–3.98)
UIBC SERPL-MCNC: 285 UG/DL (ref 110–370)
VIT B12 SERPL-MCNC: 371 PG/ML (ref 211–911)
VLDL: 15 MG/DL (ref 0–40)
WBC # BLD AUTO: 5.1 X10*3/UL (ref 4.4–11.3)

## 2024-10-18 PROCEDURE — 86803 HEPATITIS C AB TEST: CPT

## 2024-10-18 PROCEDURE — 36415 COLL VENOUS BLD VENIPUNCTURE: CPT

## 2024-10-18 PROCEDURE — 82306 VITAMIN D 25 HYDROXY: CPT

## 2024-10-18 PROCEDURE — 85027 COMPLETE CBC AUTOMATED: CPT

## 2024-10-18 PROCEDURE — 80061 LIPID PANEL: CPT

## 2024-10-18 PROCEDURE — 83540 ASSAY OF IRON: CPT

## 2024-10-18 PROCEDURE — 83036 HEMOGLOBIN GLYCOSYLATED A1C: CPT

## 2024-10-18 PROCEDURE — 82728 ASSAY OF FERRITIN: CPT

## 2024-10-18 PROCEDURE — 80053 COMPREHEN METABOLIC PANEL: CPT

## 2024-10-18 PROCEDURE — 87340 HEPATITIS B SURFACE AG IA: CPT

## 2024-10-18 PROCEDURE — 82607 VITAMIN B-12: CPT

## 2024-10-18 PROCEDURE — 82248 BILIRUBIN DIRECT: CPT

## 2024-10-18 PROCEDURE — 84630 ASSAY OF ZINC: CPT

## 2024-10-18 PROCEDURE — 84443 ASSAY THYROID STIM HORMONE: CPT

## 2024-10-18 PROCEDURE — 83550 IRON BINDING TEST: CPT

## 2024-10-19 LAB
HBV SURFACE AG SERPL QL IA: NONREACTIVE
HCV AB SER QL: NONREACTIVE

## 2024-10-20 LAB — ZINC SERPL-MCNC: 66.2 UG/DL (ref 60–120)

## 2024-10-23 ENCOUNTER — OFFICE VISIT (OUTPATIENT)
Dept: PRIMARY CARE | Facility: CLINIC | Age: 49
End: 2024-10-23
Payer: COMMERCIAL

## 2024-10-23 VITALS
OXYGEN SATURATION: 97 % | HEART RATE: 72 BPM | WEIGHT: 284.8 LBS | TEMPERATURE: 97 F | DIASTOLIC BLOOD PRESSURE: 85 MMHG | SYSTOLIC BLOOD PRESSURE: 128 MMHG | BODY MASS INDEX: 42.06 KG/M2

## 2024-10-23 DIAGNOSIS — E55.9 VITAMIN D DEFICIENCY: Primary | ICD-10-CM

## 2024-10-23 DIAGNOSIS — E53.8 B12 DEFICIENCY: ICD-10-CM

## 2024-10-23 DIAGNOSIS — G62.89 OTHER POLYNEUROPATHY: ICD-10-CM

## 2024-10-23 PROBLEM — H65.90 OTITIS, SEROUS: Status: RESOLVED | Noted: 2023-11-06 | Resolved: 2024-10-23

## 2024-10-23 PROBLEM — R09.82 POST-NASAL DRIP: Status: RESOLVED | Noted: 2023-11-06 | Resolved: 2024-10-23

## 2024-10-23 PROBLEM — K13.0 LIP LESION: Status: RESOLVED | Noted: 2023-11-06 | Resolved: 2024-10-23

## 2024-10-23 PROCEDURE — 1036F TOBACCO NON-USER: CPT | Performed by: STUDENT IN AN ORGANIZED HEALTH CARE EDUCATION/TRAINING PROGRAM

## 2024-10-23 PROCEDURE — 3074F SYST BP LT 130 MM HG: CPT | Performed by: STUDENT IN AN ORGANIZED HEALTH CARE EDUCATION/TRAINING PROGRAM

## 2024-10-23 PROCEDURE — 99213 OFFICE O/P EST LOW 20 MIN: CPT | Performed by: STUDENT IN AN ORGANIZED HEALTH CARE EDUCATION/TRAINING PROGRAM

## 2024-10-23 PROCEDURE — 3079F DIAST BP 80-89 MM HG: CPT | Performed by: STUDENT IN AN ORGANIZED HEALTH CARE EDUCATION/TRAINING PROGRAM

## 2024-10-23 RX ORDER — CYANOCOBALAMIN 1000 UG/ML
1000 INJECTION, SOLUTION INTRAMUSCULAR; SUBCUTANEOUS
Status: SHIPPED | OUTPATIENT
Start: 2024-10-24 | End: 2025-02-13

## 2024-10-23 RX ORDER — ERGOCALCIFEROL 1.25 MG/1
50000 CAPSULE ORAL WEEKLY
Qty: 4 CAPSULE | Refills: 1 | Status: SHIPPED | OUTPATIENT
Start: 2024-10-23 | End: 2024-12-18

## 2024-10-23 ASSESSMENT — ENCOUNTER SYMPTOMS
DIZZINESS: 1
HEADACHES: 0
LIGHT-HEADEDNESS: 0
FEVER: 0
SHORTNESS OF BREATH: 0
NUMBNESS: 1
WEAKNESS: 1
CHILLS: 0
COUGH: 0

## 2024-10-23 NOTE — PROGRESS NOTES
Assessment/Plan   Problem List Items Addressed This Visit    None  Visit Diagnoses         Codes    Vitamin D deficiency    -  Primary E55.9    Relevant Medications    ergocalciferol (Vitamin D-2) 1.25 MG (01560 UT) capsule    Other Relevant Orders    Vitamin D 25-Hydroxy,Total (for eval of Vitamin D levels)    B12 deficiency     E53.8    Relevant Medications    cyanocobalamin (Vitamin B-12) injection 1,000 mcg (Start on 10/24/2024  9:00 AM)    Other Relevant Orders    Vitamin B12    Other polyneuropathy     G62.89    Relevant Orders    EMG & nerve conduction            Subjective   Patient ID: Gonzalez Jason is a 49 y.o. female who presents for Pain (Been dealing with lower pain for about a year and manages the sx but in the last 2 weeks having a dull ache from the hip/groin on down, balls and heel of feet get sharp shooting pain. Worse when first getting up or sitting for long periods, also calves go numb./tingling. described as a mix of charley horse and pins and needles/Has not taken adderall for 4 days, she held so she can rest.).  HPI  Numbness and tingling bilateral LE  -She has had issues with back pain for the past 2 years  -She has gone through PT in past, steroid injections in lower spine  -Pain is described as a dull ache and feels as if someone is pushing on lower spine  -She does stretches she learned in PT twice a day  -2 weeks ago she started having pain radiating from R hip into RLE. She attempted to change to more supportive shoes. She states both of her legs feel weak and has had cramping, tightness .  -The only time she doesn'thave pain is when she is walking, hif she is standing in one position too long pain will re-start.   -Reviewed labs from 10/18 - B12 was 371 down from 520 a year ago. Discussed with patient that at levels between 200-400 some may experience numbness, tingling and dizziness.She does take oral B12 supplementation already.   -Vitamin D was low at 26, she currently takes D3  1000 units daily    Review of Systems   Constitutional:  Negative for chills and fever.   HENT:  Negative for congestion.    Respiratory:  Negative for cough and shortness of breath.    Cardiovascular:  Negative for chest pain.   Neurological:  Positive for dizziness, weakness and numbness. Negative for light-headedness and headaches.       Objective   Physical Exam  Constitutional:       General: She is not in acute distress.     Appearance: Normal appearance. She is not ill-appearing.   HENT:      Head: Normocephalic and atraumatic.   Eyes:      Extraocular Movements: Extraocular movements intact.   Cardiovascular:      Rate and Rhythm: Normal rate and regular rhythm.   Pulmonary:      Effort: Pulmonary effort is normal. No respiratory distress.      Breath sounds: Normal breath sounds. No stridor. No wheezing, rhonchi or rales.   Neurological:      Mental Status: She is alert and oriented to person, place, and time. Mental status is at baseline.      Sensory: No sensory deficit.      Motor: Weakness (slight weakness 4/5 in bilateral LE) present.            Caleb Jama MD 10/23/24 10:31 AM

## 2024-10-25 ENCOUNTER — CLINICAL SUPPORT (OUTPATIENT)
Dept: PRIMARY CARE | Facility: CLINIC | Age: 49
End: 2024-10-25
Payer: COMMERCIAL

## 2024-10-25 DIAGNOSIS — E53.8 B12 DEFICIENCY: ICD-10-CM

## 2024-10-25 PROCEDURE — 96372 THER/PROPH/DIAG INJ SC/IM: CPT | Performed by: INTERNAL MEDICINE

## 2024-10-25 RX ORDER — CYANOCOBALAMIN 1000 UG/ML
1000 INJECTION, SOLUTION INTRAMUSCULAR; SUBCUTANEOUS ONCE
Status: COMPLETED | OUTPATIENT
Start: 2024-10-25 | End: 2024-10-25

## 2024-10-31 ENCOUNTER — HOSPITAL ENCOUNTER (OUTPATIENT)
Dept: RADIOLOGY | Facility: CLINIC | Age: 49
Discharge: HOME | End: 2024-10-31
Payer: COMMERCIAL

## 2024-10-31 ENCOUNTER — APPOINTMENT (OUTPATIENT)
Dept: OBSTETRICS AND GYNECOLOGY | Facility: CLINIC | Age: 49
End: 2024-10-31
Payer: COMMERCIAL

## 2024-10-31 VITALS — HEIGHT: 68 IN | WEIGHT: 286 LBS | BODY MASS INDEX: 43.35 KG/M2

## 2024-10-31 DIAGNOSIS — R10.2 PELVIC PAIN: ICD-10-CM

## 2024-10-31 PROCEDURE — 76830 TRANSVAGINAL US NON-OB: CPT | Performed by: OBSTETRICS & GYNECOLOGY

## 2024-10-31 PROCEDURE — 99203 OFFICE O/P NEW LOW 30 MIN: CPT | Performed by: OBSTETRICS & GYNECOLOGY

## 2024-10-31 PROCEDURE — 3008F BODY MASS INDEX DOCD: CPT | Performed by: OBSTETRICS & GYNECOLOGY

## 2024-10-31 PROCEDURE — 1036F TOBACCO NON-USER: CPT | Performed by: OBSTETRICS & GYNECOLOGY

## 2024-10-31 ASSESSMENT — PAIN SCALES - GENERAL: PAINLEVEL_OUTOF10: 4

## 2024-11-08 ENCOUNTER — APPOINTMENT (OUTPATIENT)
Dept: PRIMARY CARE | Facility: CLINIC | Age: 49
End: 2024-11-08
Payer: COMMERCIAL

## 2024-11-08 VITALS — TEMPERATURE: 97.5 F

## 2024-11-08 PROCEDURE — 96372 THER/PROPH/DIAG INJ SC/IM: CPT | Performed by: STUDENT IN AN ORGANIZED HEALTH CARE EDUCATION/TRAINING PROGRAM

## 2024-11-08 NOTE — PROGRESS NOTES
Per protocol patient came in for nurse visit for b12 injection. Ok per Dr. Jama.  Patient received 1,000 mcg of vitamin B12 IM in right deltoid.   Tolerated well     Lot: [id number]  EXP 2/2025  NDC [id number]  [facility]

## 2024-11-21 ENCOUNTER — HOSPITAL ENCOUNTER (OUTPATIENT)
Dept: NEUROLOGY | Facility: CLINIC | Age: 49
Discharge: HOME | End: 2024-11-21
Payer: COMMERCIAL

## 2024-11-21 DIAGNOSIS — G62.89 OTHER POLYNEUROPATHY: ICD-10-CM

## 2024-11-21 PROCEDURE — 95911 NRV CNDJ TEST 9-10 STUDIES: CPT | Mod: GC | Performed by: PSYCHIATRY & NEUROLOGY

## 2024-11-21 PROCEDURE — 95885 MUSC TST DONE W/NERV TST LIM: CPT | Mod: 59,GC | Performed by: PSYCHIATRY & NEUROLOGY

## 2024-11-21 PROCEDURE — 95886 MUSC TEST DONE W/N TEST COMP: CPT | Mod: GC | Performed by: PSYCHIATRY & NEUROLOGY

## 2024-11-21 NOTE — RESULT ENCOUNTER NOTE
No evidence of peripheral polyneuropathy on EMG. Could consider neurology evaluation for further testing

## 2024-11-22 ENCOUNTER — APPOINTMENT (OUTPATIENT)
Dept: PRIMARY CARE | Facility: CLINIC | Age: 49
End: 2024-11-22
Payer: COMMERCIAL

## 2024-11-22 ENCOUNTER — TELEPHONE (OUTPATIENT)
Dept: PRIMARY CARE | Facility: CLINIC | Age: 49
End: 2024-11-22

## 2024-11-22 DIAGNOSIS — G62.89 OTHER POLYNEUROPATHY: Primary | ICD-10-CM

## 2024-11-22 DIAGNOSIS — F98.8 ATTENTION DEFICIT DISORDER (ADD) WITHOUT HYPERACTIVITY: ICD-10-CM

## 2024-11-22 PROCEDURE — 96372 THER/PROPH/DIAG INJ SC/IM: CPT | Performed by: STUDENT IN AN ORGANIZED HEALTH CARE EDUCATION/TRAINING PROGRAM

## 2024-11-22 NOTE — TELEPHONE ENCOUNTER
----- Message from Caleb Jama sent at 11/21/2024  2:52 PM EST -----  No evidence of peripheral polyneuropathy on EMG. Could consider neurology evaluation for further testing

## 2024-11-22 NOTE — TELEPHONE ENCOUNTER
Spoke to patient regarding results.   Would like referral to neurology for further testing. Is aware that provider is out of office until tues is ok with waiting.

## 2024-11-25 ENCOUNTER — APPOINTMENT (OUTPATIENT)
Dept: BEHAVIORAL HEALTH | Facility: CLINIC | Age: 49
End: 2024-11-25
Payer: COMMERCIAL

## 2024-11-27 RX ORDER — DEXTROAMPHETAMINE SACCHARATE, AMPHETAMINE ASPARTATE MONOHYDRATE, DEXTROAMPHETAMINE SULFATE AND AMPHETAMINE SULFATE 7.5; 7.5; 7.5; 7.5 MG/1; MG/1; MG/1; MG/1
30 CAPSULE, EXTENDED RELEASE ORAL EVERY MORNING
Qty: 30 CAPSULE | Refills: 0 | Status: SHIPPED | OUTPATIENT
Start: 2024-11-27 | End: 2024-12-27

## 2024-11-28 NOTE — TELEPHONE ENCOUNTER
Unable to review PDMP in Epic due to apparent error. Reviewed via browser. No red flags. Refill submitted. Will submit Pass Report regarding PDMP integration error given potential negative impact on patient care.     Yanira Hillman MD

## 2024-12-06 ENCOUNTER — APPOINTMENT (OUTPATIENT)
Dept: PRIMARY CARE | Facility: CLINIC | Age: 49
End: 2024-12-06
Payer: COMMERCIAL

## 2024-12-12 ENCOUNTER — APPOINTMENT (OUTPATIENT)
Dept: BEHAVIORAL HEALTH | Facility: CLINIC | Age: 49
End: 2024-12-12
Payer: COMMERCIAL

## 2024-12-12 DIAGNOSIS — F90.0 ATTENTION DEFICIT HYPERACTIVITY DISORDER (ADHD), PREDOMINANTLY INATTENTIVE TYPE: ICD-10-CM

## 2024-12-12 DIAGNOSIS — F41.1 GENERALIZED ANXIETY DISORDER: Primary | ICD-10-CM

## 2024-12-12 DIAGNOSIS — F32.1 MODERATE MAJOR DEPRESSION (MULTI): ICD-10-CM

## 2024-12-12 DIAGNOSIS — F33.1 MODERATE EPISODE OF RECURRENT MAJOR DEPRESSIVE DISORDER: ICD-10-CM

## 2024-12-12 DIAGNOSIS — F42.8 OBSESSIVE THINKING: ICD-10-CM

## 2024-12-12 PROCEDURE — 99215 OFFICE O/P EST HI 40 MIN: CPT | Performed by: NURSE PRACTITIONER

## 2024-12-12 PROCEDURE — G2212 PROLONG OUTPT/OFFICE VIS: HCPCS | Performed by: NURSE PRACTITIONER

## 2024-12-12 RX ORDER — NUTRITIONAL SUPPLEMENT/FIBER
1 LIQUID (GRAM) ORAL DAILY PRN
COMMUNITY

## 2024-12-12 RX ORDER — DULOXETIN HYDROCHLORIDE 30 MG/1
30 CAPSULE, DELAYED RELEASE ORAL DAILY
Qty: 30 CAPSULE | Refills: 11 | Status: SHIPPED | OUTPATIENT
Start: 2024-12-12 | End: 2025-12-12

## 2024-12-12 RX ORDER — PROPRANOLOL HYDROCHLORIDE 10 MG/1
30 TABLET ORAL DAILY PRN
Qty: 180 TABLET | Refills: 0 | Status: SHIPPED | OUTPATIENT
Start: 2024-12-12 | End: 2025-12-12

## 2024-12-12 ASSESSMENT — PATIENT HEALTH QUESTIONNAIRE - PHQ9
1. LITTLE INTEREST OR PLEASURE IN DOING THINGS: NOT AT ALL
3. TROUBLE FALLING OR STAYING ASLEEP OR SLEEPING TOO MUCH: NEARLY EVERY DAY
7. TROUBLE CONCENTRATING ON THINGS, SUCH AS READING THE NEWSPAPER OR WATCHING TELEVISION: NEARLY EVERY DAY
10. IF YOU CHECKED OFF ANY PROBLEMS, HOW DIFFICULT HAVE THESE PROBLEMS MADE IT FOR YOU TO DO YOUR WORK, TAKE CARE OF THINGS AT HOME, OR GET ALONG WITH OTHER PEOPLE: NOT DIFFICULT AT ALL
2. FEELING DOWN, DEPRESSED OR HOPELESS: SEVERAL DAYS
6. FEELING BAD ABOUT YOURSELF - OR THAT YOU ARE A FAILURE OR HAVE LET YOURSELF OR YOUR FAMILY DOWN: MORE THAN HALF THE DAYS
5. POOR APPETITE OR OVEREATING: NOT AT ALL
4. FEELING TIRED OR HAVING LITTLE ENERGY: SEVERAL DAYS
9. THOUGHTS THAT YOU WOULD BE BETTER OFF DEAD, OR OF HURTING YOURSELF: NOT AT ALL
8. MOVING OR SPEAKING SO SLOWLY THAT OTHER PEOPLE COULD HAVE NOTICED. OR THE OPPOSITE, BEING SO FIGETY OR RESTLESS THAT YOU HAVE BEEN MOVING AROUND A LOT MORE THAN USUAL: NOT AT ALL

## 2024-12-12 ASSESSMENT — ANXIETY QUESTIONNAIRES
6. BECOMING EASILY ANNOYED OR IRRITABLE: SEVERAL DAYS
GAD7 TOTAL SCORE: 12
IF YOU CHECKED OFF ANY PROBLEMS ON THIS QUESTIONNAIRE, HOW DIFFICULT HAVE THESE PROBLEMS MADE IT FOR YOU TO DO YOUR WORK, TAKE CARE OF THINGS AT HOME, OR GET ALONG WITH OTHER PEOPLE: SOMEWHAT DIFFICULT
3. WORRYING TOO MUCH ABOUT DIFFERENT THINGS: SEVERAL DAYS
4. TROUBLE RELAXING: NEARLY EVERY DAY
2. NOT BEING ABLE TO STOP OR CONTROL WORRYING: SEVERAL DAYS
7. FEELING AFRAID AS IF SOMETHING AWFUL MIGHT HAPPEN: SEVERAL DAYS
1. FEELING NERVOUS, ANXIOUS, OR ON EDGE: MORE THAN HALF THE DAYS
5. BEING SO RESTLESS THAT IT IS HARD TO SIT STILL: NEARLY EVERY DAY
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
## 2024-12-16 PROBLEM — F41.9 ANXIETY AND DEPRESSION: Status: RESOLVED | Noted: 2020-09-24 | Resolved: 2024-12-16

## 2024-12-16 PROBLEM — F32.A ANXIETY AND DEPRESSION: Status: RESOLVED | Noted: 2020-09-24 | Resolved: 2024-12-16

## 2024-12-16 PROBLEM — F42.8 OBSESSIVE THINKING: Status: ACTIVE | Noted: 2024-12-16

## 2024-12-16 RX ORDER — DEXTROAMPHETAMINE SACCHARATE, AMPHETAMINE ASPARTATE MONOHYDRATE, DEXTROAMPHETAMINE SULFATE AND AMPHETAMINE SULFATE 7.5; 7.5; 7.5; 7.5 MG/1; MG/1; MG/1; MG/1
30 CAPSULE, EXTENDED RELEASE ORAL EVERY MORNING
Qty: 30 CAPSULE | Refills: 0 | Status: SHIPPED | OUTPATIENT
Start: 2025-01-26 | End: 2025-02-25

## 2024-12-16 RX ORDER — DEXTROAMPHETAMINE SACCHARATE, AMPHETAMINE ASPARTATE MONOHYDRATE, DEXTROAMPHETAMINE SULFATE AND AMPHETAMINE SULFATE 7.5; 7.5; 7.5; 7.5 MG/1; MG/1; MG/1; MG/1
30 CAPSULE, EXTENDED RELEASE ORAL EVERY MORNING
Qty: 30 CAPSULE | Refills: 0 | Status: SHIPPED | OUTPATIENT
Start: 2024-12-27 | End: 2025-01-26

## 2024-12-16 RX ORDER — DEXTROAMPHETAMINE SACCHARATE, AMPHETAMINE ASPARTATE MONOHYDRATE, DEXTROAMPHETAMINE SULFATE AND AMPHETAMINE SULFATE 7.5; 7.5; 7.5; 7.5 MG/1; MG/1; MG/1; MG/1
30 CAPSULE, EXTENDED RELEASE ORAL EVERY MORNING
Qty: 30 CAPSULE | Refills: 0 | Status: SHIPPED | OUTPATIENT
Start: 2025-02-25 | End: 2025-03-27

## 2024-12-20 ENCOUNTER — APPOINTMENT (OUTPATIENT)
Dept: PRIMARY CARE | Facility: CLINIC | Age: 49
End: 2024-12-20
Payer: COMMERCIAL

## 2024-12-20 DIAGNOSIS — E53.8 B12 DEFICIENCY: ICD-10-CM

## 2024-12-20 PROCEDURE — 96372 THER/PROPH/DIAG INJ SC/IM: CPT | Performed by: STUDENT IN AN ORGANIZED HEALTH CARE EDUCATION/TRAINING PROGRAM

## 2024-12-20 RX ORDER — CYANOCOBALAMIN 1000 UG/ML
1000 INJECTION, SOLUTION INTRAMUSCULAR; SUBCUTANEOUS ONCE
Status: COMPLETED | OUTPATIENT
Start: 2024-12-20 | End: 2024-12-20

## 2025-01-03 ENCOUNTER — APPOINTMENT (OUTPATIENT)
Dept: PRIMARY CARE | Facility: CLINIC | Age: 50
End: 2025-01-03
Payer: COMMERCIAL

## 2025-01-03 VITALS — TEMPERATURE: 97.7 F

## 2025-01-03 PROCEDURE — 96372 THER/PROPH/DIAG INJ SC/IM: CPT | Performed by: STUDENT IN AN ORGANIZED HEALTH CARE EDUCATION/TRAINING PROGRAM

## 2025-01-03 RX ADMIN — CYANOCOBALAMIN 1000 MCG: 1000 INJECTION, SOLUTION INTRAMUSCULAR; SUBCUTANEOUS at 09:30

## 2025-01-03 NOTE — PROGRESS NOTES
Per protocol patient came in for nurse visit to receive B12 injection Ok per Dr. Jama.  Patient received 1,000 mcg of B12 IM in left deltoid.   Tolerated well  LOT [id number]  EXP 2/25  NDC 6732-5849-84  [employer]

## 2025-01-21 ENCOUNTER — APPOINTMENT (OUTPATIENT)
Dept: BEHAVIORAL HEALTH | Facility: CLINIC | Age: 50
End: 2025-01-21
Payer: COMMERCIAL

## 2025-01-21 DIAGNOSIS — G47.9 SLEEP DIFFICULTIES: ICD-10-CM

## 2025-01-21 DIAGNOSIS — F42.8 OBSESSIVE THINKING: ICD-10-CM

## 2025-01-21 DIAGNOSIS — F32.1 MODERATE MAJOR DEPRESSION (MULTI): Primary | ICD-10-CM

## 2025-01-21 DIAGNOSIS — F41.1 GENERALIZED ANXIETY DISORDER: ICD-10-CM

## 2025-01-21 DIAGNOSIS — F90.2 ATTENTION DEFICIT HYPERACTIVITY DISORDER (ADHD), COMBINED TYPE: ICD-10-CM

## 2025-01-21 PROCEDURE — 99214 OFFICE O/P EST MOD 30 MIN: CPT | Performed by: NURSE PRACTITIONER

## 2025-01-21 RX ORDER — AMITRIPTYLINE HYDROCHLORIDE 10 MG/1
50 TABLET, FILM COATED ORAL NIGHTLY PRN
Qty: 300 TABLET | Refills: 0 | Status: SHIPPED | OUTPATIENT
Start: 2025-01-21 | End: 2025-03-22

## 2025-01-21 ASSESSMENT — PATIENT HEALTH QUESTIONNAIRE - PHQ9
5. POOR APPETITE OR OVEREATING: MORE THAN HALF THE DAYS
2. FEELING DOWN, DEPRESSED OR HOPELESS: NEARLY EVERY DAY
6. FEELING BAD ABOUT YOURSELF - OR THAT YOU ARE A FAILURE OR HAVE LET YOURSELF OR YOUR FAMILY DOWN: NOT AT ALL
10. IF YOU CHECKED OFF ANY PROBLEMS, HOW DIFFICULT HAVE THESE PROBLEMS MADE IT FOR YOU TO DO YOUR WORK, TAKE CARE OF THINGS AT HOME, OR GET ALONG WITH OTHER PEOPLE: SOMEWHAT DIFFICULT
3. TROUBLE FALLING OR STAYING ASLEEP OR SLEEPING TOO MUCH: NEARLY EVERY DAY
7. TROUBLE CONCENTRATING ON THINGS, SUCH AS READING THE NEWSPAPER OR WATCHING TELEVISION: MORE THAN HALF THE DAYS
9. THOUGHTS THAT YOU WOULD BE BETTER OFF DEAD, OR OF HURTING YOURSELF: NOT AT ALL
4. FEELING TIRED OR HAVING LITTLE ENERGY: SEVERAL DAYS
8. MOVING OR SPEAKING SO SLOWLY THAT OTHER PEOPLE COULD HAVE NOTICED. OR THE OPPOSITE, BEING SO FIGETY OR RESTLESS THAT YOU HAVE BEEN MOVING AROUND A LOT MORE THAN USUAL: NOT AT ALL
1. LITTLE INTEREST OR PLEASURE IN DOING THINGS: NEARLY EVERY DAY

## 2025-01-21 ASSESSMENT — ANXIETY QUESTIONNAIRES
4. TROUBLE RELAXING: NEARLY EVERY DAY
IF YOU CHECKED OFF ANY PROBLEMS ON THIS QUESTIONNAIRE, HOW DIFFICULT HAVE THESE PROBLEMS MADE IT FOR YOU TO DO YOUR WORK, TAKE CARE OF THINGS AT HOME, OR GET ALONG WITH OTHER PEOPLE: SOMEWHAT DIFFICULT
5. BEING SO RESTLESS THAT IT IS HARD TO SIT STILL: SEVERAL DAYS
2. NOT BEING ABLE TO STOP OR CONTROL WORRYING: MORE THAN HALF THE DAYS
1. FEELING NERVOUS, ANXIOUS, OR ON EDGE: MORE THAN HALF THE DAYS
7. FEELING AFRAID AS IF SOMETHING AWFUL MIGHT HAPPEN: SEVERAL DAYS
3. WORRYING TOO MUCH ABOUT DIFFERENT THINGS: SEVERAL DAYS
GAD7 TOTAL SCORE: 11
6. BECOMING EASILY ANNOYED OR IRRITABLE: SEVERAL DAYS

## 2025-01-21 NOTE — PROGRESS NOTES
"Outpatient Psychiatry    Subjective   Gonzalez Jason, a 49 y.o. female, presenting to Psychiatry for evaluation.  Patient is referred by Teresa Mckeon MD \"Things have been good. I feel like there has been improvement in the increased dose of the medication. I am sorry for missing our appt as I was having back to back appts. I do notice that whenever I miss a dose of the Adderall I do lose track of things.\"      Virtual Consent    An interactive audio and video telecommunication system which permits real time communications between the patient (at home) and provider (at [facility] office) was utilized to provide this telehealth service.     Verbal consent was requested and obtained from Gonzalez Jason on this date, 01/21/2025 for a telehealth visit.      HPI:    Present Illness - anxiety, depression, ADHD     Characteristics/Recent psychiatric symptoms (pertinent positives and negatives) - reports anxiety is more present - feeling the pressure in her body in response to her son's self-loathing/hatred and his responding to the vitriol and transphobia from the outside world (political climate), is so upsetting to him that he is crying, yelling and angry and she is trying to be supportive, and be calm outwardly but on the inside is feeling torn apart by her anxiety but also the sadness, and depression of not being able to help him with his struggles. Reports feeling more alone, wanting to be more seclusive, less motivated but wants to at the same time, be there for him and help him get connected with others in the community. Reports he is getting to be in front of  this Friday to get his birth certificate changed with proper gender listed. Reports less panic attacks (still attributes to them for her stressful job and her son's mental state), as she had talked with her therapist, and she finally spoke up and stood up for herself and told to her boss in a nice way, of how the company, and he himself has been " treating her 'like a moron' and within 24 hours, he has been now treating her nicer. By speaking up, she felt the stress come off of her shoulders and felt much calmer. Reports having used the Propranolol several times and has found it to be helpful with her physical anxiety symptom management. Admits her parents are both having health issues and that is contributing to her depression, even though they are both alive still 'and the thought of losing them does terrify me, and I did lose a friend to cancer 2 weeks ago. 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 Sleep averages 4-4.5 hrs a night, and rarely gets 6 hrs a night. Tried Trazodone and it leaves her with a hangover (same with Ambien - headache), and Atarax (like Benadryl - dried out). Energy levels have been 'low' and mental/physical fatigue 'are pretty tired. Especially over the past several weeks, it's because I am eating later and that alone is affecting my diet.' Admits to racing, and obsessive thoughts only (about her son).     Onset/timeframe - 1 month  Type - anxiety, depression, ADHD  Duration - daily  Aggravating and/or relieving factors/triggers  Treatment and treatment changes (new meds, dosage increases or decreases, med compliance, therapy frequency, etc.) (Past and Recent) - See below.    Issues: Denies SI/HI/AVH currently    Psychiatric Review Of Systems:  Depressive Symptoms: anhedonia, appetite increased, concentration, energy, helpless, hopeless, and sleep decreased   Manic Symptoms: negative  Anxiety Symptoms: General Anxiety Disorder (PIETRO)PIETRO Behaviors: difficult to control worry, excessive anxiety/worry, easily fatigued,  irritability, restlessness, sleep disturbance, and dread and Obsessive Compulsive Disorder (OCD)OCD Behaviors: repetitive thoughts  Psychotic Symptoms: negative  Other Symptoms:    Current Medications:    Current Outpatient Medications:     amphetamine-dextroamphetamine XR (Adderall XR) 30 mg 24 hr capsule, Take 1 capsule (30 mg) by mouth once daily in the morning. Do not crush or chew. Do not fill before December 27, 2024., Disp: 30 capsule, Rfl: 0    amphetamine-dextroamphetamine XR (Adderall XR) 30 mg 24 hr capsule, Take 1 capsule (30 mg) by mouth once daily in the morning. Do not crush or chew. Do not fill before January 26, 2025., Disp: 30 capsule, Rfl: 0    [START ON 2/25/2025] amphetamine-dextroamphetamine XR (Adderall XR) 30 mg 24 hr capsule, Take 1 capsule (30 mg) by mouth once daily in the morning. Do not crush or chew. Do not fill before February 25, 2025., Disp: 30 capsule, Rfl: 0    beta carotene (vitamin A) 3,000 mcg (10,000 unit) capsule, Take 1 capsule (10,000 Units) by mouth once daily., Disp: , Rfl:     cholecalciferol (Vitamin D3) 25 MCG (1000 UT) tablet, Take 2 tablets (2,000 Units) by mouth once daily. (Patient taking differently: Take 2 tablets (2,000 Units) by mouth once daily. Actual amount is 5000 units/daily), Disp: , Rfl:     DULoxetine (Cymbalta) 30 mg DR capsule, Take 1 capsule (30 mg) by mouth once daily. Do not crush or chew., Disp: 30 capsule, Rfl: 11    fexofenadine (Allegra) 180 mg tablet, Take 1 tablet (180 mg) by mouth once daily as needed (allergies)., Disp: , Rfl:     foot care products pad, 1 Application once daily as needed (plantar's fasciatis)., Disp: , Rfl:     nutritional supplement-fiber (Quinoa-Kale-Hemp) liquid, Apply 1 Application topically once daily as needed (arthritic pain)., Disp: , Rfl:     pantoprazole (ProtoNix) 40 mg EC tablet, Take 1 tablet (40 mg) by mouth 2 times a day., Disp: , Rfl:     propranolol (Inderal) 10 mg tablet, Take 3 tablets (30 mg) by mouth  once daily as needed (physical anxiety symptom management)., Disp: 180 tablet, Rfl: 0    vitamin B complex tablet, Take 1 tablet by mouth once daily., Disp: , Rfl:     zinc gluconate 50 mg tablet, Take 1 tablet (50 mg) by mouth once daily., Disp: , Rfl:     amitriptyline (Elavil) 10 mg tablet, Take 5 tablets (50 mg) by mouth as needed at bedtime for sleep. Can take up to 5 tablets (starting initially at 1 tablet) for sleep., Disp: 300 tablet, Rfl: 0    DULoxetine (Cymbalta) 60 mg DR capsule, Take 1 capsule (60 mg) by mouth once daily. Do not crush or chew., Disp: 30 capsule, Rfl: 11    zolpidem (Ambien) 5 mg tablet, Take 1 tablet (5 mg) by mouth as needed at bedtime for sleep., Disp: 30 tablet, Rfl: 1    Current Facility-Administered Medications:     cyanocobalamin (Vitamin B-12) injection 1,000 mcg, 1,000 mcg, intramuscular, q14 days, Caleb Jama MD, 1,000 mcg at 01/03/25 0930    Medical History:  Past Medical History:   Diagnosis Date    ADD (attention deficit disorder)     Anxiety     Depression     GERD (gastroesophageal reflux disease)     HTN (hypertension)     Liver hemangioma        Substance Use History:  Tobacco use: former  Use of alcohol:  1-2 drinks a year  Use of caffeine: coffee 1-2 /day  Use of other substances: denies  Legal consequences of substance use: n/a  Substance use disorder treatment: n/a    Record Review: brief     Medical Review Of Systems:  Behavioral/Psych: positive for anxiety and depression    OARRS:  NIRANJAN Burgess-CNP on 1/26/2025 11:50 PM  I have personally reviewed the OARRS report for Gonzalez Jason. I have considered the risks of abuse, dependence, addiction and diversion    Is the patient prescribed a combination of a benzodiazepine and opioid?  No    Last Urine Drug Screen / ordered today: Yes  No results found for this or any previous visit (from the past 8760 hours).  Results are as expected.         Controlled Substance Agreement:  Date of the Last Agreement:  12/12/2024  Reviewed Controlled Substance Agreement including but not limited to the benefits, risks, and alternatives to treatment with a Controlled Substance medication(s).    Stimulants:   What is the patient's goal of therapy? Stable ADHD  Is this being achieved with current treatment? yes    Activities of Daily Living:   Is your overall impression that this patient is benefiting (symptom reduction outweighs side effects) from stimulant therapy? Yes     1. Physical Functioning: Better  2. Family Relationship: Same  3. Social Relationship: Worse  4. Mood: Worse  5. Sleep Patterns: Worse  6. Overall Function: Worse      Objective   Mental Status Exam  Appearance: Well-groomed  Attitude: Calm, cooperative, at times distracted but  engaged in conversation.  Behavior: Appropriate eye contact.   Motor Activity: No psychomotor agitation or retardation. No abnormal movements, tremors or tics. No evidence of extrapyramidal symptoms or tardive dyskinesia.  Speech: Regular rate, rhythm, volume. Spontaneous, no pressured speech.  Mood: 'doing ok'  Affect: Dysphoric, constricted yet reactive, anxious but mood congruent.  Thought Process: Linear, logical, and goal-directed but also racing, obsessive thoughts. No loose associations or gross thought disorganization.  Thought Content: Denied current suicidal ideation or thoughts of harm to self, denied homicidal ideation or thoughts of harm to others. No delusional thinking elicited. No perseverations or obsessions identified. LGBT issues d/t changes in political landscape but more so in regards to her trans son who is autistic and his safety.  Perception: Did not endorse auditory or visual hallucinations, did not appear to be responding to hallucinatory stimuli.   Cognition: Alert, oriented x3. Preserved attention span and concentration, recent and remote memory. Adequate fund of knowledge. No deficits in language.   Insight: Fair, in regards to understanding mental health  condition  Judgement: Fair      PIETRO-7/PHQ-9 scores reviewed: 11, 14 compared to 12, 10 reflecting mild improvement in anxiety and a small increase in depression.     Other Objective Information:  Labs ordered and pending.      Risk Assessment:  Risk of harm to self: Low Risk -- Risk factors include: No significant risk factors identified on screening Protective factors include:Denies current suicidal ideation and Denies history of suicide attempts     Risk of harm to others: Low Risk - Risk factors include: No significant risk factors identified on screening. Protective factors include: Lack of known history of harm to others  and Lack of known history of violent ideation     PSYCHOTHERAPY:  Plan: goals, type of therapy/modality, mental status when leaving, dx, time, continues seeing therapist     Diagnoses and all orders for this visit:  Sleep difficulties (Primary)  -     amitriptyline (Elavil) 10 mg tablet; Take 5 tablets (50 mg) by mouth as needed at bedtime for sleep. Can take up to 5 tablets (starting initially at 1 tablet) for sleep.       Plan/Recommendations:  Medications: Starts taking Amitriptyline (Elavil) 10-50mg at bedtime (for sleep and adjunct for depression). Continues Cymbalta DR 30mg every day. Continues taking Adderall XR 30mg every day. Continues taking Propranolol 10mg-20mg as needed for anxiety management (test taking, presentation anxiety, generalized anxiety) - physiological symptom management. Monitor HR closely, and make sure it does not drop below 75 BPM.   Orders: Cooperative but anxious. Admits stressed over changes in political landscape is concerning to her, especially with worrying about her transgender son's safety and future. Having had to switch appt to virtual, next appt will be done in person to sign CSA but also reminded patient that she needs to get the urine tests done in order for Adderall to be refilled. Adding on Elavil for sleep and adjunct for depression (giving patient  room from 10-50mg to figure out which dose works best for sleep without leaving her overly groggy in the AM), but leave other medications and treatment plan in place.   Follow up: 03/25/2025  Call  Psychiatry at [phone] with issues.  For [facility] residents, [employer] is a 24/7 hotline you can call for assistance [[phone]]. Please call [phone] or go to your closest Emergency Room if you feel unsafe. This includes thoughts of hurting yourself or anyone else, or having other troubles such as hearing voices, seeing visions, or having new and scary thoughts about the people around you.    Review with patient: Treatment plan reviewed with the patient.  Medication risks/benefit reviewed with the patient  Time Spent:    Prep time: 1 min.  Direct patient time: 29 min.  Documentation time: 6 min.  Total time: 36 min.    Js Carrillo, APRN-CNP

## 2025-01-26 PROBLEM — G47.9 SLEEP DIFFICULTIES: Status: ACTIVE | Noted: 2025-01-26

## 2025-03-10 ENCOUNTER — OFFICE VISIT (OUTPATIENT)
Dept: PRIMARY CARE | Facility: CLINIC | Age: 50
End: 2025-03-10
Payer: COMMERCIAL

## 2025-03-10 VITALS
WEIGHT: 293 LBS | OXYGEN SATURATION: 96 % | HEIGHT: 69 IN | BODY MASS INDEX: 43.4 KG/M2 | HEART RATE: 89 BPM | DIASTOLIC BLOOD PRESSURE: 80 MMHG | TEMPERATURE: 97.5 F | SYSTOLIC BLOOD PRESSURE: 124 MMHG

## 2025-03-10 DIAGNOSIS — N30.00 ACUTE CYSTITIS WITHOUT HEMATURIA: ICD-10-CM

## 2025-03-10 DIAGNOSIS — R30.0 DYSURIA: Primary | ICD-10-CM

## 2025-03-10 LAB
POC APPEARANCE, URINE: CLEAR
POC BILIRUBIN, URINE: NEGATIVE
POC BLOOD, URINE: NEGATIVE
POC COLOR, URINE: YELLOW
POC GLUCOSE, URINE: NEGATIVE MG/DL
POC KETONES, URINE: NEGATIVE MG/DL
POC LEUKOCYTES, URINE: ABNORMAL
POC NITRITE,URINE: NEGATIVE
POC PH, URINE: 7 PH
POC PROTEIN, URINE: NEGATIVE MG/DL
POC SPECIFIC GRAVITY, URINE: 1.01
POC UROBILINOGEN, URINE: 0.2 EU/DL

## 2025-03-10 PROCEDURE — 99213 OFFICE O/P EST LOW 20 MIN: CPT | Performed by: NURSE PRACTITIONER

## 2025-03-10 PROCEDURE — 81003 URINALYSIS AUTO W/O SCOPE: CPT | Performed by: NURSE PRACTITIONER

## 2025-03-10 PROCEDURE — 3008F BODY MASS INDEX DOCD: CPT | Performed by: NURSE PRACTITIONER

## 2025-03-10 PROCEDURE — 1036F TOBACCO NON-USER: CPT | Performed by: NURSE PRACTITIONER

## 2025-03-10 PROCEDURE — 3074F SYST BP LT 130 MM HG: CPT | Performed by: NURSE PRACTITIONER

## 2025-03-10 PROCEDURE — 3079F DIAST BP 80-89 MM HG: CPT | Performed by: NURSE PRACTITIONER

## 2025-03-10 RX ORDER — NITROFURANTOIN 25; 75 MG/1; MG/1
100 CAPSULE ORAL 2 TIMES DAILY
Qty: 10 CAPSULE | Refills: 0 | Status: SHIPPED | OUTPATIENT
Start: 2025-03-10 | End: 2025-03-15

## 2025-03-10 ASSESSMENT — ENCOUNTER SYMPTOMS
RESPIRATORY NEGATIVE: 1
MUSCULOSKELETAL NEGATIVE: 1
PSYCHIATRIC NEGATIVE: 1
CONSTITUTIONAL NEGATIVE: 1
CARDIOVASCULAR NEGATIVE: 1
NEUROLOGICAL NEGATIVE: 1

## 2025-03-10 NOTE — PROGRESS NOTES
"Subjective   Patient ID: Gonzalez Jason is a 49 y.o. female who presents for UTI (Lot of lower back pain, urgency, pain, since Thursday. Patient has history of kidney stones.).    HPI Presents today with symptoms that started 4 days ago.   Pain with urination, back pain and feeling like have to urinate more frequently.   No fever or chills.    Review of Systems   Constitutional: Negative.    Respiratory: Negative.     Cardiovascular: Negative.    Genitourinary:         As noted in HPI     Musculoskeletal: Negative.    Neurological: Negative.    Psychiatric/Behavioral: Negative.         Objective   /80 (BP Location: Left arm, Patient Position: Sitting)   Pulse 89   Temp 36.4 °C (97.5 °F)   Ht 1.753 m (5' 9\")   Wt 133 kg (294 lb 3.2 oz)   SpO2 96%   BMI 43.45 kg/m²     Physical Exam  Constitutional:       Appearance: Normal appearance.   Cardiovascular:      Rate and Rhythm: Normal rate and regular rhythm.   Pulmonary:      Effort: Pulmonary effort is normal.      Breath sounds: Normal breath sounds.   Abdominal:      Tenderness: There is no right CVA tenderness, left CVA tenderness, guarding or rebound.   Musculoskeletal:         General: Normal range of motion.   Neurological:      General: No focal deficit present.      Mental Status: She is alert.   Psychiatric:         Mood and Affect: Mood normal.         Behavior: Behavior normal.         Assessment/Plan   Problem List Items Addressed This Visit    None  Visit Diagnoses         Codes    Dysuria    -  Primary R30.0    Relevant Medications    nitrofurantoin, macrocrystal-monohydrate, (Macrobid) 100 mg capsule    Other Relevant Orders    POCT UA Automated manually resulted (Completed)    Acute cystitis without hematuria     N30.00    Relevant Orders    POCT UA Automated manually resulted (Completed)    Urine Culture               "

## 2025-03-10 NOTE — PATIENT INSTRUCTIONS
Increase fluids and rest.   Take the antibiotic as directed.    Let me know in 48 hours if no better.

## 2025-03-12 LAB — BACTERIA UR CULT: ABNORMAL

## 2025-03-25 ENCOUNTER — APPOINTMENT (OUTPATIENT)
Dept: BEHAVIORAL HEALTH | Facility: CLINIC | Age: 50
End: 2025-03-25
Payer: COMMERCIAL

## 2025-03-25 VITALS
HEART RATE: 79 BPM | BODY MASS INDEX: 43.4 KG/M2 | DIASTOLIC BLOOD PRESSURE: 86 MMHG | SYSTOLIC BLOOD PRESSURE: 129 MMHG | WEIGHT: 293 LBS | TEMPERATURE: 97.7 F | HEIGHT: 69 IN

## 2025-03-25 DIAGNOSIS — F90.0 ATTENTION DEFICIT HYPERACTIVITY DISORDER (ADHD), PREDOMINANTLY INATTENTIVE TYPE: ICD-10-CM

## 2025-03-25 DIAGNOSIS — F41.1 GENERALIZED ANXIETY DISORDER: ICD-10-CM

## 2025-03-25 DIAGNOSIS — G47.9 SLEEP DIFFICULTIES: ICD-10-CM

## 2025-03-25 DIAGNOSIS — F42.8 OBSESSIVE THINKING: ICD-10-CM

## 2025-03-25 DIAGNOSIS — F33.1 MODERATE EPISODE OF RECURRENT MAJOR DEPRESSIVE DISORDER: Primary | ICD-10-CM

## 2025-03-25 DIAGNOSIS — F33.41 RECURRENT MAJOR DEPRESSIVE DISORDER, IN PARTIAL REMISSION (CMS-HCC): ICD-10-CM

## 2025-03-25 DIAGNOSIS — F32.1 MODERATE MAJOR DEPRESSION (MULTI): ICD-10-CM

## 2025-03-25 PROCEDURE — 3008F BODY MASS INDEX DOCD: CPT | Performed by: NURSE PRACTITIONER

## 2025-03-25 PROCEDURE — 3079F DIAST BP 80-89 MM HG: CPT | Performed by: NURSE PRACTITIONER

## 2025-03-25 PROCEDURE — 1036F TOBACCO NON-USER: CPT | Performed by: NURSE PRACTITIONER

## 2025-03-25 PROCEDURE — 3074F SYST BP LT 130 MM HG: CPT | Performed by: NURSE PRACTITIONER

## 2025-03-25 PROCEDURE — 99214 OFFICE O/P EST MOD 30 MIN: CPT | Performed by: NURSE PRACTITIONER

## 2025-03-25 RX ORDER — DEXTROAMPHETAMINE SACCHARATE, AMPHETAMINE ASPARTATE MONOHYDRATE, DEXTROAMPHETAMINE SULFATE AND AMPHETAMINE SULFATE 7.5; 7.5; 7.5; 7.5 MG/1; MG/1; MG/1; MG/1
30 CAPSULE, EXTENDED RELEASE ORAL EVERY MORNING
Qty: 30 CAPSULE | Refills: 0 | Status: SHIPPED | OUTPATIENT
Start: 2025-05-26 | End: 2025-06-25

## 2025-03-25 RX ORDER — DULOXETIN HYDROCHLORIDE 60 MG/1
60 CAPSULE, DELAYED RELEASE ORAL DAILY
Qty: 90 CAPSULE | Refills: 3 | Status: SHIPPED | OUTPATIENT
Start: 2025-03-25 | End: 2026-03-25

## 2025-03-25 RX ORDER — DEXTROAMPHETAMINE SACCHARATE, AMPHETAMINE ASPARTATE MONOHYDRATE, DEXTROAMPHETAMINE SULFATE AND AMPHETAMINE SULFATE 7.5; 7.5; 7.5; 7.5 MG/1; MG/1; MG/1; MG/1
30 CAPSULE, EXTENDED RELEASE ORAL EVERY MORNING
Qty: 30 CAPSULE | Refills: 0 | Status: SHIPPED | OUTPATIENT
Start: 2025-04-26 | End: 2025-05-26

## 2025-03-25 RX ORDER — DULOXETIN HYDROCHLORIDE 30 MG/1
30 CAPSULE, DELAYED RELEASE ORAL DAILY
Qty: 90 CAPSULE | Refills: 3 | Status: SHIPPED | OUTPATIENT
Start: 2025-03-25 | End: 2026-03-25

## 2025-03-25 RX ORDER — DEXTROAMPHETAMINE SACCHARATE, AMPHETAMINE ASPARTATE MONOHYDRATE, DEXTROAMPHETAMINE SULFATE AND AMPHETAMINE SULFATE 7.5; 7.5; 7.5; 7.5 MG/1; MG/1; MG/1; MG/1
30 CAPSULE, EXTENDED RELEASE ORAL EVERY MORNING
Qty: 30 CAPSULE | Refills: 0 | Status: SHIPPED | OUTPATIENT
Start: 2025-03-27 | End: 2025-04-26

## 2025-03-25 ASSESSMENT — PATIENT HEALTH QUESTIONNAIRE - PHQ9
9. THOUGHTS THAT YOU WOULD BE BETTER OFF DEAD, OR OF HURTING YOURSELF: NOT AT ALL
1. LITTLE INTEREST OR PLEASURE IN DOING THINGS: NOT AT ALL
6. FEELING BAD ABOUT YOURSELF - OR THAT YOU ARE A FAILURE OR HAVE LET YOURSELF OR YOUR FAMILY DOWN: SEVERAL DAYS
7. TROUBLE CONCENTRATING ON THINGS, SUCH AS READING THE NEWSPAPER OR WATCHING TELEVISION: NOT AT ALL
5. POOR APPETITE OR OVEREATING: NEARLY EVERY DAY
2. FEELING DOWN, DEPRESSED OR HOPELESS: SEVERAL DAYS
3. TROUBLE FALLING OR STAYING ASLEEP OR SLEEPING TOO MUCH: NEARLY EVERY DAY
10. IF YOU CHECKED OFF ANY PROBLEMS, HOW DIFFICULT HAVE THESE PROBLEMS MADE IT FOR YOU TO DO YOUR WORK, TAKE CARE OF THINGS AT HOME, OR GET ALONG WITH OTHER PEOPLE: SOMEWHAT DIFFICULT
8. MOVING OR SPEAKING SO SLOWLY THAT OTHER PEOPLE COULD HAVE NOTICED. OR THE OPPOSITE, BEING SO FIGETY OR RESTLESS THAT YOU HAVE BEEN MOVING AROUND A LOT MORE THAN USUAL: NOT AT ALL
4. FEELING TIRED OR HAVING LITTLE ENERGY: SEVERAL DAYS

## 2025-03-25 ASSESSMENT — ANXIETY QUESTIONNAIRES
7. FEELING AFRAID AS IF SOMETHING AWFUL MIGHT HAPPEN: NOT AT ALL
5. BEING SO RESTLESS THAT IT IS HARD TO SIT STILL: MORE THAN HALF THE DAYS
6. BECOMING EASILY ANNOYED OR IRRITABLE: NOT AT ALL
1. FEELING NERVOUS, ANXIOUS, OR ON EDGE: MORE THAN HALF THE DAYS
IF YOU CHECKED OFF ANY PROBLEMS ON THIS QUESTIONNAIRE, HOW DIFFICULT HAVE THESE PROBLEMS MADE IT FOR YOU TO DO YOUR WORK, TAKE CARE OF THINGS AT HOME, OR GET ALONG WITH OTHER PEOPLE: SOMEWHAT DIFFICULT
3. WORRYING TOO MUCH ABOUT DIFFERENT THINGS: NEARLY EVERY DAY
GAD7 TOTAL SCORE: 13
4. TROUBLE RELAXING: NEARLY EVERY DAY
2. NOT BEING ABLE TO STOP OR CONTROL WORRYING: NEARLY EVERY DAY

## 2025-03-25 ASSESSMENT — PAIN SCALES - GENERAL: PAINLEVEL_OUTOF10: 2

## 2025-03-25 NOTE — PROGRESS NOTES
"Outpatient Psychiatry    Subjective   Gonzalez Jason, a 49 y.o. female, presenting to Psychiatry for evaluation.  Patient is referred by Teresa Mckeon MD \"I am good. I am just ready for the weather to be done. I am just not sure if I am ready to find the right medication to sleep. I am taking 1/2 of the Elavil and it helps me sleep but that brain fog in the morning... otherwise I am good.\"    Virtual Consent    An interactive audio and video telecommunication system which permits real time communications between the patient (at home) and provider (at  [facility] office) was utilized to provide this telehealth service.     Verbal consent was requested and obtained from Gonzalez Jason on this date, 01/21/2025 for a telehealth visit.      HPI:    Present Illness - anxiety     Characteristics/Recent psychiatric symptoms (pertinent positives and negatives) - reports her most stressful issue in her life are her kids - her son is struggling with his IOP. He is doing well academically but he is so fearful of the world (politically lately), that he is not leaving their home, as he is so worried of transphobia and he doesn't want to be around people. Patient is going to [employer] and wants him to be around others similar to him, and help him be comfortable and he is not ready and that frustrates her. And then her daughter is dealing with PCOS and body image issues and that is leaving her upset and sad for her daughter and just trying to be there for her. Reports her main, regular job is a [employer], and she is heading into her 29th year and feels marginalized, even though she vocalizes her concerns about how everyone is doubling the work on various projects that are unnecessary and is outspoken and feels she should be allowed to speak her mind as she had always kept her mouth shut in the past, and feels she should be in her boss's job as he takes forever and is frustrated with the process. \"It is my day job, and I hate it. 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 When they get to work together after work hours, they have fun, having a good time working late into the night, laughing and being in each other's presence. Depression is stable overall. Reports making sure to manage how much she gets involved with political news and not get overwhelmed, or spiraling but does it for her trans son's emotional and mental well-being. Sleep is only 4-5 hours a night but uses 5mg of Elavil d/t morning grogginess and Melatonin to help her sleep. Rarely has needed to use Propranolol. Adderall continues to work for ADHD. Feels overall mental health is 'fine. I am more hopeful then usual as I know my stress is work. I am in a better place then before.' Energy levels have been 'low' in the morning but can go back to sleep very easily if she lays down and may nap periodically (15-60 min.) and mental/physical fatigue 'is still there.' Especially over the past several weeks, it's because I am eating later and that alone is affecting my diet.' Admits to racing, and obsessive thoughts only (about her son).     Onset/timeframe - 1 month  Type - anxiety, depression, ADHD  Duration - daily  Aggravating and/or relieving factors/triggers  Treatment and treatment changes (new meds, dosage increases or decreases, med compliance, therapy frequency, etc.) (Past and Recent) - See below.    Issues: Denies SI/HI/AVH currently    Psychiatric Review Of Systems:  Depressive Symptoms: appetite increased, energy, guilt, hopeless, sleep decreased , and sleep increased  Manic Symptoms: negative  Anxiety Symptoms: General Anxiety Disorder (PIETRO)PIETRO Behaviors: difficult to control worry, excessive anxiety/worry, easily fatigued, restlessness, and sleep disturbance and Obsessive Compulsive Disorder  (OCD)OCD Behaviors: repetitive thoughts  Psychotic Symptoms: negative  Other Symptoms:    Current Medications:    Current Outpatient Medications:     amitriptyline (Elavil) 10 mg tablet, Take 5 tablets (50 mg) by mouth as needed at bedtime for sleep. Can take up to 5 tablets (starting initially at 1 tablet) for sleep., Disp: 300 tablet, Rfl: 0    amphetamine-dextroamphetamine XR (Adderall XR) 30 mg 24 hr capsule, Take 1 capsule (30 mg) by mouth once daily in the morning. Do not crush or chew. Do not fill before December 27, 2024., Disp: 30 capsule, Rfl: 0    amphetamine-dextroamphetamine XR (Adderall XR) 30 mg 24 hr capsule, Take 1 capsule (30 mg) by mouth once daily in the morning. Do not crush or chew. Do not fill before January 26, 2025., Disp: 30 capsule, Rfl: 0    amphetamine-dextroamphetamine XR (Adderall XR) 30 mg 24 hr capsule, Take 1 capsule (30 mg) by mouth once daily in the morning. Do not crush or chew. Do not fill before February 25, 2025., Disp: 30 capsule, Rfl: 0    beta carotene (vitamin A) 3,000 mcg (10,000 unit) capsule, Take 1 capsule (10,000 Units) by mouth once daily., Disp: , Rfl:     cholecalciferol (Vitamin D3) 25 MCG (1000 UT) tablet, Take 2 tablets (2,000 Units) by mouth once daily., Disp: , Rfl:     DULoxetine (Cymbalta) 30 mg DR capsule, Take 1 capsule (30 mg) by mouth once daily. Do not crush or chew., Disp: 30 capsule, Rfl: 11    DULoxetine (Cymbalta) 60 mg DR capsule, Take 1 capsule (60 mg) by mouth once daily. Do not crush or chew., Disp: 30 capsule, Rfl: 11    fexofenadine (Allegra) 180 mg tablet, Take 1 tablet (180 mg) by mouth once daily as needed (allergies)., Disp: , Rfl:     foot care products pad, 1 Application once daily as needed (plantar's fasciatis)., Disp: , Rfl:     nutritional supplement-fiber (Quinoa-Kale-Hemp) liquid, Apply 1 Application topically once daily as needed (arthritic pain)., Disp: , Rfl:     pantoprazole (ProtoNix) 40 mg EC tablet, Take 1 tablet (40 mg) by  mouth 2 times a day., Disp: , Rfl:     propranolol (Inderal) 10 mg tablet, TAKE 3 TABLETS (30 MG) BY MOUTH ONCE DAILY AS NEEDED (PHYSICAL ANXIETY SYMPTOM MANAGEMENT)., Disp: 90 tablet, Rfl: 3    vitamin B complex tablet, Take 1 tablet by mouth once daily., Disp: , Rfl:     zinc gluconate 50 mg tablet, Take 1 tablet (50 mg) by mouth once daily., Disp: , Rfl:     zolpidem (Ambien) 5 mg tablet, Take 1 tablet (5 mg) by mouth as needed at bedtime for sleep., Disp: 30 tablet, Rfl: 1    Medical History:  Past Medical History:   Diagnosis Date    ADD (attention deficit disorder)     Anxiety     Depression     GERD (gastroesophageal reflux disease)     HTN (hypertension)     Liver hemangioma        Substance Use History:  Tobacco use: former  Use of alcohol:  1-2 drinks a year  Use of caffeine: coffee 1-2 /day  Use of other substances: denies  Legal consequences of substance use: n/a  Substance use disorder treatment: n/a    Record Review: brief     Medical Review Of Systems:  Behavioral/Psych: positive for anxiety and depression    OARRS:  No data recorded  I have personally reviewed the OARRS report for Gonzalez Jason. I have considered the risks of abuse, dependence, addiction and diversion    Is the patient prescribed a combination of a benzodiazepine and opioid?  No    Last Urine Drug Screen / ordered today: Yes  No results found for this or any previous visit (from the past 8760 hours).  Results are as expected.         Controlled Substance Agreement:  Date of the Last Agreement: 12/12/2024  Reviewed Controlled Substance Agreement including but not limited to the benefits, risks, and alternatives to treatment with a Controlled Substance medication(s).    Stimulants:   What is the patient's goal of therapy? Stable ADHD  Is this being achieved with current treatment? yes    Activities of Daily Living:   Is your overall impression that this patient is benefiting (symptom reduction outweighs side effects) from stimulant  therapy? Yes     1. Physical Functioning: Better  2. Family Relationship: Same  3. Social Relationship: Worse  4. Mood: Worse  5. Sleep Patterns: Worse  6. Overall Function: Worse      Objective   Mental Status Exam  Appearance: Well-groomed  Attitude: Calm, cooperative, at times distracted but  engaged in conversation.  Behavior: Appropriate eye contact.   Motor Activity: No psychomotor agitation or retardation. No abnormal movements, tremors or tics. No evidence of extrapyramidal symptoms or tardive dyskinesia.  Speech: Regular rate, rhythm, volume. Spontaneous, no pressured speech.  Mood: 'doing ok'  Affect: Dysphoric, constricted yet reactive, anxious but mood congruent.  Thought Process: Linear, logical, and goal-directed but also racing, obsessive thoughts. No loose associations or gross thought disorganization.  Thought Content: Denied current suicidal ideation or thoughts of harm to self, denied homicidal ideation or thoughts of harm to others. No delusional thinking elicited. No perseverations or obsessions identified. LGBT issues d/t changes in political landscape but more so in regards to her trans son who is autistic and his safety.  Perception: Did not endorse auditory or visual hallucinations, did not appear to be responding to hallucinatory stimuli.   Cognition: Alert, oriented x3. Preserved attention span and concentration, recent and remote memory. Adequate fund of knowledge. No deficits in language.   Insight: Fair, in regards to understanding mental health condition  Judgement: Fair      PIETRO-7/PHQ-9 scores reviewed: 13, 9 compared to 11, 14 reflecting mild improvement in anxiety and depression.     Other Objective Information:  Labs ordered and pending.      Risk Assessment:  Risk of harm to self: Low Risk -- Risk factors include: No significant risk factors identified on screening Protective factors include:Denies current suicidal ideation and Denies history of suicide attempts     Risk of harm  to others: Low Risk - Risk factors include: No significant risk factors identified on screening. Protective factors include: Lack of known history of harm to others  and Lack of known history of violent ideation     PSYCHOTHERAPY:  Plan: goals, type of therapy/modality, mental status when leaving, dx, time, continues seeing therapist     There are no diagnoses linked to this encounter.       Plan/Recommendations:  Medications: Continues taking Amitriptyline (Elavil) 10-50mg at bedtime (for sleep and adjunct for depression). Continues Cymbalta DR 90mg every day. Continues taking Adderall XR 30mg every day. Continues taking Propranolol 10mg-20mg as needed for anxiety management (test taking, presentation anxiety, generalized anxiety) - physiological symptom management. Monitor HR closely, and make sure it does not drop below 75 BPM.   Orders: Cooperative and less anxious or sad. Admits stressed over changes in political landscape is concerning to her, but is doing everything in her power to be an ally to the LGBT community for her transgender son's safety and future. Has not needed to use Propranolol. Elavil for sleep is helping as well as having some improvement with depression. Agreed to leaving other medications and treatment plan in place. Continues to see her therapist. Signed CSA. Did urine tests yesterday - results pending.  Follow up: 06/23  Call  Psychiatry at [phone] with issues.  For [facility] residents, [employer] is a 24/7 hotline you can call for assistance [[phone]]. Please call [phone] or go to your closest Emergency Room if you feel unsafe. This includes thoughts of hurting yourself or anyone else, or having other troubles such as hearing voices, seeing visions, or having new and scary thoughts about the people around you.    Review with patient: Treatment plan reviewed with the patient.  Medication risks/benefit reviewed with the patient  Time Spent:    Prep time: 1  min.  Direct patient time: 31 min.  Documentation time: 6 min.  Total time: 38 min.    Js Carrillo, NIRANJAN-CNP

## 2025-03-26 LAB
AMPHET UR-MCNC: 1670 NG/ML
AMPHET UR-MCNC: 1788 NG/ML
AMPHETAMINES UR QL: POSITIVE NG/ML
BARBITURATES UR QL: NEGATIVE NG/ML
BENZODIAZ UR QL: NEGATIVE NG/ML
BZE UR QL: NEGATIVE NG/ML
CREAT UR-MCNC: 29.7 MG/DL
DRUG SCREEN COMMENT UR-IMP: ABNORMAL
MDA UR-MCNC: NEGATIVE NG/ML
MDEA UR-MCNC: NEGATIVE NG/ML
MDMA UR-MCNC: NEGATIVE NG/ML
METHADONE UR QL: NEGATIVE NG/ML
METHAMPHET UR-MCNC: NEGATIVE NG/ML
METHAMPHET UR-MCNC: NEGATIVE NG/ML
OPIATES UR QL: NEGATIVE NG/ML
OXIDANTS UR QL: NEGATIVE MCG/ML
OXYCODONE UR QL: NEGATIVE NG/ML
PCP UR QL: NEGATIVE NG/ML
PH UR: 7.4 [PH] (ref 4.5–9)
PHENTERMINE UR-MCNC: NEGATIVE NG/ML
QUEST NOTES AND COMMENTS: ABNORMAL
THC UR QL: NEGATIVE NG/ML

## 2025-04-04 ENCOUNTER — HOSPITAL ENCOUNTER (OUTPATIENT)
Dept: RADIOLOGY | Facility: CLINIC | Age: 50
Discharge: HOME | End: 2025-04-04
Payer: COMMERCIAL

## 2025-04-04 VITALS — BODY MASS INDEX: 43.4 KG/M2 | WEIGHT: 293 LBS | HEIGHT: 69 IN

## 2025-04-04 DIAGNOSIS — Z12.31 SCREENING MAMMOGRAM FOR BREAST CANCER: ICD-10-CM

## 2025-04-04 PROCEDURE — 77067 SCR MAMMO BI INCL CAD: CPT

## 2025-04-07 ENCOUNTER — PATIENT MESSAGE (OUTPATIENT)
Dept: BEHAVIORAL HEALTH | Facility: CLINIC | Age: 50
End: 2025-04-07

## 2025-04-09 ENCOUNTER — HOSPITAL ENCOUNTER (OUTPATIENT)
Dept: RADIOLOGY | Facility: EXTERNAL LOCATION | Age: 50
Discharge: HOME | End: 2025-04-09

## 2025-04-09 DIAGNOSIS — Z12.31 SCREENING MAMMOGRAM FOR BREAST CANCER: ICD-10-CM

## 2025-04-10 ENCOUNTER — TELEMEDICINE (OUTPATIENT)
Dept: BEHAVIORAL HEALTH | Facility: CLINIC | Age: 50
End: 2025-04-10
Payer: COMMERCIAL

## 2025-04-10 DIAGNOSIS — F33.2 SEVERE EPISODE OF RECURRENT MAJOR DEPRESSIVE DISORDER, WITHOUT PSYCHOTIC FEATURES (MULTI): Primary | ICD-10-CM

## 2025-04-10 DIAGNOSIS — G47.9 SLEEP DIFFICULTIES: ICD-10-CM

## 2025-04-10 DIAGNOSIS — F41.1 GENERALIZED ANXIETY DISORDER: ICD-10-CM

## 2025-04-10 DIAGNOSIS — F90.2 ATTENTION DEFICIT HYPERACTIVITY DISORDER (ADHD), COMBINED TYPE: ICD-10-CM

## 2025-04-10 DIAGNOSIS — F42.8 OBSESSIVE THINKING: ICD-10-CM

## 2025-04-10 PROCEDURE — 99214 OFFICE O/P EST MOD 30 MIN: CPT | Performed by: NURSE PRACTITIONER

## 2025-04-10 ASSESSMENT — ANXIETY QUESTIONNAIRES
GAD7 TOTAL SCORE: 21
1. FEELING NERVOUS, ANXIOUS, OR ON EDGE: NEARLY EVERY DAY
4. TROUBLE RELAXING: NEARLY EVERY DAY
IF YOU CHECKED OFF ANY PROBLEMS ON THIS QUESTIONNAIRE, HOW DIFFICULT HAVE THESE PROBLEMS MADE IT FOR YOU TO DO YOUR WORK, TAKE CARE OF THINGS AT HOME, OR GET ALONG WITH OTHER PEOPLE: EXTREMELY DIFFICULT
5. BEING SO RESTLESS THAT IT IS HARD TO SIT STILL: NEARLY EVERY DAY
7. FEELING AFRAID AS IF SOMETHING AWFUL MIGHT HAPPEN: NEARLY EVERY DAY
3. WORRYING TOO MUCH ABOUT DIFFERENT THINGS: NEARLY EVERY DAY
6. BECOMING EASILY ANNOYED OR IRRITABLE: NEARLY EVERY DAY
2. NOT BEING ABLE TO STOP OR CONTROL WORRYING: NEARLY EVERY DAY

## 2025-04-10 ASSESSMENT — PATIENT HEALTH QUESTIONNAIRE - PHQ9
5. POOR APPETITE OR OVEREATING: NEARLY EVERY DAY
4. FEELING TIRED OR HAVING LITTLE ENERGY: NEARLY EVERY DAY
2. FEELING DOWN, DEPRESSED OR HOPELESS: NEARLY EVERY DAY
8. MOVING OR SPEAKING SO SLOWLY THAT OTHER PEOPLE COULD HAVE NOTICED. OR THE OPPOSITE, BEING SO FIGETY OR RESTLESS THAT YOU HAVE BEEN MOVING AROUND A LOT MORE THAN USUAL: NEARLY EVERY DAY
1. LITTLE INTEREST OR PLEASURE IN DOING THINGS: NEARLY EVERY DAY
7. TROUBLE CONCENTRATING ON THINGS, SUCH AS READING THE NEWSPAPER OR WATCHING TELEVISION: NEARLY EVERY DAY
3. TROUBLE FALLING OR STAYING ASLEEP OR SLEEPING TOO MUCH: NEARLY EVERY DAY
10. IF YOU CHECKED OFF ANY PROBLEMS, HOW DIFFICULT HAVE THESE PROBLEMS MADE IT FOR YOU TO DO YOUR WORK, TAKE CARE OF THINGS AT HOME, OR GET ALONG WITH OTHER PEOPLE: VERY DIFFICULT
9. THOUGHTS THAT YOU WOULD BE BETTER OFF DEAD, OR OF HURTING YOURSELF: NOT AT ALL
6. FEELING BAD ABOUT YOURSELF - OR THAT YOU ARE A FAILURE OR HAVE LET YOURSELF OR YOUR FAMILY DOWN: NEARLY EVERY DAY

## 2025-04-10 NOTE — PROGRESS NOTES
"Outpatient Psychiatry    Subjective   Gonzalez Jason, a 49 y.o. female, presenting to Psychiatry for evaluation.  Patient is referred by Teresa Mckeon MD \"I try to will myself to not feel marginalized and I feel like I am be devalued at work. I am trying to not allow myself feel overwhelmed. Dealing with my son and his IOP program and now my daughter dealing with her PCOS, I can't sleep. I am not eating right. I have talked to my boss and he is fearful to allow me to do my work, and that is making me feel worse and now I worry that I may have over-shared. My boss questions everything that I do. I have built up more of everything that I do, that I have done over the past year. I am feeling my anxiety and depression is up. I am burned out. I wonder if I should just take time off to help manage myself better.\"    Virtual Consent    An interactive audio and video telecommunication system which permits real time communications between the patient (at home) and provider (at [facility] office) was utilized to provide this telehealth service.     Verbal consent was requested and obtained from Gonzalez Jason on this date, 04/10/2025 for a telehealth visit.      HPI:    Present Illness - anxiety     Characteristics/Recent psychiatric symptoms (pertinent positives and negatives) - admits increased depression and anxiety. 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 Wants to know about starting FMLA. Also is wary about the political issues and how there is such a disconnect between those at work worrying about the tarrifs impacting the businesses bottom line vs her son and his worrying about his getting beaten up being killed because he is trans. Sleep is 'not being able to " sleep.' Even with sleep medications, she is dealing with interrupted sleep, that she is barely getting 4 hours of sleep at night. Emotional and stress eating - to 'fill that anxious void' is weighing on her void and admits she needs to contact her PCP to address that. Used to smoke cigarettes and won't resume that but it is on the periphery of her thoughts. Even with Adderall, focus is shot to hell but now she can't as it will be gone by hour 3 instead of hour 6. Admits increasingly mentally/physically fatigued. Severe anhedonia and now dealing with constant racing, obsessive, ruminating and intrusive thoughts. Feels the trigger for all of this was her daughter's PCOS dx that was the icing on the cake and she felt she now has no control over anything and also reminds her of how she feels treated at work 'My opinion is not valued.'    Onset/timeframe - 2 weeks  Type - anxiety, depression, ADHD  Duration - daily  Aggravating and/or relieving factors/triggers - children's mental/medical issues, burned out with work, politics  Treatment and treatment changes (new meds, dosage increases or decreases, med compliance, therapy frequency, etc.) (Past and Recent) - See below.    Issues: Denies SI/HI/AVH currently    Psychiatric Review Of Systems:  Depressive Symptoms: anhedonia, appetite increased, concentration, energy, guilt, helpless, hopeless, interest, psychomotor retardation, sleep decreased , tearfulness, and withdrawn  Manic Symptoms: negative  Anxiety Symptoms: General Anxiety Disorder (PIETRO)PIETRO Behaviors: difficult to control worry, excessive anxiety/worry, difficulty concentrating, easily fatigued, irritability, restlessness, sleep disturbance, and dread and Obsessive Compulsive Disorder (OCD)OCD Behaviors: repetitive thoughts and ruminatory thoughts  Psychotic Symptoms: negative  Other Symptoms:    Current Medications:    Current Outpatient Medications:     amphetamine-dextroamphetamine XR (Adderall XR) 30 mg 24 hr  capsule, Take 1 capsule (30 mg) by mouth once daily in the morning. Do not crush or chew. Do not fill before March 27, 2025., Disp: 30 capsule, Rfl: 0    [START ON 4/26/2025] amphetamine-dextroamphetamine XR (Adderall XR) 30 mg 24 hr capsule, Take 1 capsule (30 mg) by mouth once daily in the morning. Do not crush or chew. Do not fill before April 26, 2025., Disp: 30 capsule, Rfl: 0    [START ON 5/26/2025] amphetamine-dextroamphetamine XR (Adderall XR) 30 mg 24 hr capsule, Take 1 capsule (30 mg) by mouth once daily in the morning. Do not crush or chew. Do not fill before May 26, 2025., Disp: 30 capsule, Rfl: 0    beta carotene (vitamin A) 3,000 mcg (10,000 unit) capsule, Take 1 capsule (10,000 Units) by mouth once daily., Disp: , Rfl:     cholecalciferol (Vitamin D3) 25 MCG (1000 UT) tablet, Take 2 tablets (2,000 Units) by mouth once daily., Disp: , Rfl:     DULoxetine (Cymbalta) 30 mg DR capsule, Take 1 capsule (30 mg) by mouth once daily. Do not crush or chew., Disp: 90 capsule, Rfl: 3    DULoxetine (Cymbalta) 60 mg DR capsule, Take 1 capsule (60 mg) by mouth once daily. Do not crush or chew., Disp: 90 capsule, Rfl: 3    fexofenadine (Allegra) 180 mg tablet, Take 1 tablet (180 mg) by mouth once daily as needed (allergies)., Disp: , Rfl:     foot care products pad, 1 Application once daily as needed (plantar's fasciatis)., Disp: , Rfl:     nutritional supplement-fiber (Quinoa-Kale-Hemp) liquid, Apply 1 Application topically once daily as needed (arthritic pain)., Disp: , Rfl:     pantoprazole (ProtoNix) 40 mg EC tablet, Take 1 tablet (40 mg) by mouth 2 times a day., Disp: , Rfl:     propranolol (Inderal) 10 mg tablet, TAKE 3 TABLETS (30 MG) BY MOUTH ONCE DAILY AS NEEDED (PHYSICAL ANXIETY SYMPTOM MANAGEMENT)., Disp: 90 tablet, Rfl: 3    vitamin B complex tablet, Take 1 tablet by mouth once daily., Disp: , Rfl:     zinc gluconate 50 mg tablet, Take 1 tablet (50 mg) by mouth once daily., Disp: , Rfl:     amitriptyline  (Elavil) 10 mg tablet, Take 5 tablets (50 mg) by mouth as needed at bedtime for sleep. Can take up to 5 tablets (starting initially at 1 tablet) for sleep., Disp: 300 tablet, Rfl: 0    amphetamine-dextroamphetamine XR (Adderall XR) 30 mg 24 hr capsule, Take 1 capsule (30 mg) by mouth once daily in the morning. Do not crush or chew. Do not fill before February 25, 2025., Disp: 30 capsule, Rfl: 0    zolpidem (Ambien) 5 mg tablet, Take 1 tablet (5 mg) by mouth as needed at bedtime for sleep., Disp: 30 tablet, Rfl: 1    Medical History:  Past Medical History:   Diagnosis Date    ADD (attention deficit disorder)     Anxiety     Depression     GERD (gastroesophageal reflux disease)     HTN (hypertension)     Liver hemangioma        Substance Use History:  Tobacco use: former  Use of alcohol:  1-2 drinks a year  Use of caffeine: coffee 1-2 /day  Use of other substances: denies  Legal consequences of substance use: n/a  Substance use disorder treatment: n/a    Record Review: brief     Medical Review Of Systems:  Behavioral/Psych: positive for anxiety and depression    OARRS:  Js Carrillo, APRN-CNP on 4/13/2025  6:39 PM  I have personally reviewed the OARRS report for Gonzalez Jason. I have considered the risks of abuse, dependence, addiction and diversion    Is the patient prescribed a combination of a benzodiazepine and opioid?  No    Last Urine Drug Screen / ordered today: Yes  Recent Results (from the past 8760 hours)   Amphetamine Confirm, Urine    Collection Time: 03/24/25 11:55 AM   Result Value Ref Range    AMPHETAMINE 1,788 (H) <200 ng/mL    METHAMPHETAMINE NEGATIVE <200 ng/mL    PHENTERMINE NEGATIVE <200 ng/mL    MDA NEGATIVE <200 ng/mL    MDMA NEGATIVE <200 ng/mL    MDEA NEGATIVE <200 ng/mL   Drug Screen, Urine With Reflex to Confirmation    Collection Time: 03/24/25 11:55 AM   Result Value Ref Range    Amphetamines POSITIVE (A) <500 ng/mL    Amphetamine 1,670 (H) <250 ng/mL    Methamphetamine NEGATIVE <250  ng/mL    Amphetamines Comments      Barbiturates NEGATIVE <300 ng/mL    Benzodiazepines NEGATIVE <100 ng/mL    Cocaine Metabolite NEGATIVE <150 ng/mL    Marijuana Metabolite NEGATIVE <20 ng/mL    Methadone Metabolite NEGATIVE <100 ng/mL    Opiates NEGATIVE <100 ng/mL    Oxycodone NEGATIVE <100 ng/mL    Phencyclidine NEGATIVE <25 ng/mL    Creatinine 29.7 > or = 20.0 mg/dL    pH 7.4 4.5 - 9.0    Oxidant NEGATIVE <200 mcg/mL    Notes and Comments       Results are as expected.         Controlled Substance Agreement:  Date of the Last Agreement: 12/12/2024  Reviewed Controlled Substance Agreement including but not limited to the benefits, risks, and alternatives to treatment with a Controlled Substance medication(s).    Stimulants:   What is the patient's goal of therapy? Stable ADHD  Is this being achieved with current treatment? no    Activities of Daily Living:   Is your overall impression that this patient is benefiting (symptom reduction outweighs side effects) from stimulant therapy? no     1. Physical Functioning: Worse  2. Family Relationship: Same  3. Social Relationship: Worse  4. Mood: Worse  5. Sleep Patterns: Worse  6. Overall Function: Worse      Objective   Mental Status Exam  Appearance: Well-groomed  Attitude: Cooperative, distracted but engaged in conversation.  Behavior: Fair eye contact.   Motor Activity: Fidgeting. No abnormal movements, tremors or tics. No evidence of extrapyramidal symptoms or tardive dyskinesia.  Speech: Rapid, pressured and perseverative speech.  Mood: 'not good, upset, anxious, down, angry'  Affect: Dysphoric, anxious, tearful and mood congruent.  Thought Process: Linear, logical, and goal-directed but also perseverative racing, obsessive, ruminating thoughts. No loose associations or gross thought disorganization.  Thought Content: Denied current suicidal ideation or thoughts of harm to self, denied homicidal ideation or thoughts of harm to others. No delusional thinking  elicited. No perseverations or obsessions identified. Work stress is horrible/lack of recognition, trying to balance work/life balance is gone - feeling burned out, trying to be there for her son and daughter (both with their own issues) while trying not to let politics weigh in, but it is a concern underlying it all.  Perception: Did not endorse auditory or visual hallucinations, did not appear to be responding to hallucinatory stimuli.   Cognition: Alert, oriented x3. Preserved attention span and concentration, recent and remote memory. Adequate fund of knowledge. No deficits in language.   Insight: Fair, in regards to understanding mental health condition  Judgement: Fair      PIETRO-7/PHQ-9 scores reviewed: 21, 24 compared to 13, 9 reflecting significant jumps in anxiety and depression.     Other Objective Information:  Labs ordered and pending.      Risk Assessment:  Risk of harm to self: Low Risk -- Risk factors include: No significant risk factors identified on screening Protective factors include:Denies current suicidal ideation and Denies history of suicide attempts     Risk of harm to others: Low Risk - Risk factors include: No significant risk factors identified on screening. Protective factors include: Lack of known history of harm to others  and Lack of known history of violent ideation     PSYCHOTHERAPY:  Plan: goals, type of therapy/modality, mental status when leaving, dx, time, continues seeing therapist     There are no diagnoses linked to this encounter.       Plan/Recommendations:  Medications: Continues taking Amitriptyline (Elavil) 10-50mg at bedtime (for sleep and adjunct for depression). Continues Cymbalta DR 90mg every day. Continues taking Adderall XR 30mg every day. Continues taking Propranolol 10mg-20mg as needed for anxiety management (test taking, presentation anxiety, generalized anxiety) - physiological symptom management. Monitor HR closely, and make sure it does not drop below 75 BPM.    Orders: Last seen just over 2 weeks prior. No changes to medications but open to admitting she is burned out from work stress, taking care of being a supportive mother for her kids, trying to find time for herself and worrying about the political nightmare unfolding across [place]. Agreed that no changes were needed to her medications, but would be getting FMLA paperwork started - 1st month off, and then intermittent leave thereafter. Continues to see her therapist. Labs reflect she is taking the Adderall taken daily.  Follow up: 06/23  Call  Psychiatry at [phone] with issues.  For [facility] residents, [employer] is a 24/7 hotline you can call for assistance [[phone]]. Please call [phone] or go to your closest Emergency Room if you feel unsafe. This includes thoughts of hurting yourself or anyone else, or having other troubles such as hearing voices, seeing visions, or having new and scary thoughts about the people around you.    Review with patient: Treatment plan reviewed with the patient.  Medication risks/benefit reviewed with the patient  Time Spent:    Prep time: 1 min.  Direct patient time: 32 min.  Documentation time: 6 min.  Total time: 39 min.    Js Carrillo, APRN-CNP

## 2025-05-06 ENCOUNTER — APPOINTMENT (OUTPATIENT)
Dept: PRIMARY CARE | Facility: CLINIC | Age: 50
End: 2025-05-06
Payer: COMMERCIAL

## 2025-05-06 VITALS
TEMPERATURE: 97.5 F | DIASTOLIC BLOOD PRESSURE: 80 MMHG | WEIGHT: 293 LBS | BODY MASS INDEX: 44.41 KG/M2 | HEIGHT: 68 IN | SYSTOLIC BLOOD PRESSURE: 130 MMHG

## 2025-05-06 DIAGNOSIS — E66.813 CLASS 3 SEVERE OBESITY WITH SERIOUS COMORBIDITY AND BODY MASS INDEX (BMI) OF 40.0 TO 44.9 IN ADULT, UNSPECIFIED OBESITY TYPE: ICD-10-CM

## 2025-05-06 DIAGNOSIS — I10 ESSENTIAL HYPERTENSION: ICD-10-CM

## 2025-05-06 DIAGNOSIS — E55.9 VITAMIN D DEFICIENCY: ICD-10-CM

## 2025-05-06 DIAGNOSIS — R73.01 IMPAIRED FASTING BLOOD SUGAR: ICD-10-CM

## 2025-05-06 DIAGNOSIS — M25.552 BILATERAL HIP PAIN: Primary | ICD-10-CM

## 2025-05-06 DIAGNOSIS — R53.83 OTHER FATIGUE: ICD-10-CM

## 2025-05-06 DIAGNOSIS — Z98.84 H/O GASTRIC BYPASS: ICD-10-CM

## 2025-05-06 DIAGNOSIS — M25.551 BILATERAL HIP PAIN: Primary | ICD-10-CM

## 2025-05-06 DIAGNOSIS — R74.8 ELEVATED LIVER ENZYMES: ICD-10-CM

## 2025-05-06 DIAGNOSIS — E78.5 HYPERLIPIDEMIA, UNSPECIFIED HYPERLIPIDEMIA TYPE: ICD-10-CM

## 2025-05-06 DIAGNOSIS — E53.8 B12 DEFICIENCY: ICD-10-CM

## 2025-05-06 PROCEDURE — 3079F DIAST BP 80-89 MM HG: CPT | Performed by: INTERNAL MEDICINE

## 2025-05-06 PROCEDURE — 3075F SYST BP GE 130 - 139MM HG: CPT | Performed by: INTERNAL MEDICINE

## 2025-05-06 PROCEDURE — 99214 OFFICE O/P EST MOD 30 MIN: CPT | Performed by: INTERNAL MEDICINE

## 2025-05-06 PROCEDURE — 3008F BODY MASS INDEX DOCD: CPT | Performed by: INTERNAL MEDICINE

## 2025-05-06 RX ORDER — METHYLPREDNISOLONE 4 MG/1
TABLET ORAL
Qty: 21 TABLET | Refills: 0 | Status: SHIPPED | OUTPATIENT
Start: 2025-05-06 | End: 2025-05-12

## 2025-05-08 ENCOUNTER — HOSPITAL ENCOUNTER (OUTPATIENT)
Dept: RADIOLOGY | Facility: CLINIC | Age: 50
Discharge: HOME | End: 2025-05-08
Payer: COMMERCIAL

## 2025-05-08 DIAGNOSIS — M25.552 BILATERAL HIP PAIN: ICD-10-CM

## 2025-05-08 DIAGNOSIS — M25.551 BILATERAL HIP PAIN: ICD-10-CM

## 2025-05-08 PROCEDURE — 73502 X-RAY EXAM HIP UNI 2-3 VIEWS: CPT | Mod: LT

## 2025-05-08 PROCEDURE — 73502 X-RAY EXAM HIP UNI 2-3 VIEWS: CPT | Mod: LEFT SIDE | Performed by: RADIOLOGY

## 2025-05-09 LAB
25(OH)D3+25(OH)D2 SERPL-MCNC: 36 NG/ML (ref 30–100)
ALBUMIN SERPL-MCNC: 3.8 G/DL (ref 3.6–5.1)
ALP SERPL-CCNC: 59 U/L (ref 37–153)
ALT SERPL-CCNC: 14 U/L (ref 6–29)
ANA SER QL IF: NORMAL
ANION GAP SERPL CALCULATED.4IONS-SCNC: 8 MMOL/L (CALC) (ref 7–17)
AST SERPL-CCNC: 13 U/L (ref 10–35)
BILIRUB SERPL-MCNC: 1 MG/DL (ref 0.2–1.2)
BUN SERPL-MCNC: 17 MG/DL (ref 7–25)
CALCIUM SERPL-MCNC: 8.7 MG/DL (ref 8.6–10.4)
CHLORIDE SERPL-SCNC: 104 MMOL/L (ref 98–110)
CHOLEST SERPL-MCNC: 215 MG/DL
CHOLEST/HDLC SERPL: 2.9 (CALC)
CO2 SERPL-SCNC: 29 MMOL/L (ref 20–32)
CREAT SERPL-MCNC: 0.52 MG/DL (ref 0.5–1.03)
EGFRCR SERPLBLD CKD-EPI 2021: 112 ML/MIN/1.73M2
ERYTHROCYTE [DISTWIDTH] IN BLOOD BY AUTOMATED COUNT: 13 % (ref 11–15)
ERYTHROCYTE [SEDIMENTATION RATE] IN BLOOD BY WESTERGREN METHOD: 6 MM/H
EST. AVERAGE GLUCOSE BLD GHB EST-MCNC: 111 MG/DL
EST. AVERAGE GLUCOSE BLD GHB EST-SCNC: 6.2 MMOL/L
FERRITIN SERPL-MCNC: 8 NG/ML (ref 16–232)
GLUCOSE SERPL-MCNC: 101 MG/DL (ref 65–99)
HBA1C MFR BLD: 5.5 %
HCT VFR BLD AUTO: 45 % (ref 35–45)
HDLC SERPL-MCNC: 75 MG/DL
HGB BLD-MCNC: 14.7 G/DL (ref 11.7–15.5)
IRON SATN MFR SERPL: 25 % (CALC) (ref 16–45)
IRON SERPL-MCNC: 95 MCG/DL (ref 45–160)
LDLC SERPL CALC-MCNC: 119 MG/DL (CALC)
MCH RBC QN AUTO: 29.3 PG (ref 27–33)
MCHC RBC AUTO-ENTMCNC: 32.7 G/DL (ref 32–36)
MCV RBC AUTO: 89.8 FL (ref 80–100)
NONHDLC SERPL-MCNC: 140 MG/DL (CALC)
PLATELET # BLD AUTO: 264 THOUSAND/UL (ref 140–400)
PMV BLD REES-ECKER: 10.5 FL (ref 7.5–12.5)
POTASSIUM SERPL-SCNC: 4.6 MMOL/L (ref 3.5–5.3)
PROT SERPL-MCNC: 6.2 G/DL (ref 6.1–8.1)
RBC # BLD AUTO: 5.01 MILLION/UL (ref 3.8–5.1)
RHEUMATOID FACT SERPL-ACNC: <10 IU/ML
SODIUM SERPL-SCNC: 141 MMOL/L (ref 135–146)
TIBC SERPL-MCNC: 374 MCG/DL (CALC) (ref 250–450)
TRIGL SERPL-MCNC: 103 MG/DL
TSH SERPL-ACNC: 1.4 MIU/L
URATE SERPL-MCNC: 4 MG/DL (ref 2.5–7)
VIT B12 SERPL-MCNC: 377 PG/ML (ref 200–1100)
WBC # BLD AUTO: 5.6 THOUSAND/UL (ref 3.8–10.8)

## 2025-05-13 LAB
25(OH)D3+25(OH)D2 SERPL-MCNC: 36 NG/ML (ref 30–100)
ALBUMIN SERPL-MCNC: 3.8 G/DL (ref 3.6–5.1)
ALP SERPL-CCNC: 59 U/L (ref 37–153)
ALT SERPL-CCNC: 14 U/L (ref 6–29)
ANA PAT SER IF-IMP: ABNORMAL
ANA SER QL IF: POSITIVE
ANA TITR SER IF: ABNORMAL TITER
ANION GAP SERPL CALCULATED.4IONS-SCNC: 8 MMOL/L (CALC) (ref 7–17)
AST SERPL-CCNC: 13 U/L (ref 10–35)
BILIRUB SERPL-MCNC: 1 MG/DL (ref 0.2–1.2)
BUN SERPL-MCNC: 17 MG/DL (ref 7–25)
CALCIUM SERPL-MCNC: 8.7 MG/DL (ref 8.6–10.4)
CENTROMERE B AB SER-ACNC: ABNORMAL AI
CHLORIDE SERPL-SCNC: 104 MMOL/L (ref 98–110)
CHOLEST SERPL-MCNC: 215 MG/DL
CHOLEST/HDLC SERPL: 2.9 (CALC)
CO2 SERPL-SCNC: 29 MMOL/L (ref 20–32)
CREAT SERPL-MCNC: 0.52 MG/DL (ref 0.5–1.03)
DSDNA AB SER-ACNC: 1 IU/ML
EGFRCR SERPLBLD CKD-EPI 2021: 112 ML/MIN/1.73M2
ENA JO1 AB SER IA-ACNC: ABNORMAL AI
ENA RNP AB SER-ACNC: ABNORMAL AI
ENA SCL70 AB SER IA-ACNC: ABNORMAL AI
ENA SM AB SER IA-ACNC: ABNORMAL AI
ENA SM+RNP AB SER IA-ACNC: ABNORMAL AI
ENA SS-A AB SER IA-ACNC: ABNORMAL AI
ENA SS-B AB SER IA-ACNC: ABNORMAL AI
ERYTHROCYTE [DISTWIDTH] IN BLOOD BY AUTOMATED COUNT: 13 % (ref 11–15)
ERYTHROCYTE [SEDIMENTATION RATE] IN BLOOD BY WESTERGREN METHOD: 6 MM/H
EST. AVERAGE GLUCOSE BLD GHB EST-MCNC: 111 MG/DL
EST. AVERAGE GLUCOSE BLD GHB EST-SCNC: 6.2 MMOL/L
FERRITIN SERPL-MCNC: 8 NG/ML (ref 16–232)
GLUCOSE SERPL-MCNC: 101 MG/DL (ref 65–99)
HBA1C MFR BLD: 5.5 %
HCT VFR BLD AUTO: 45 % (ref 35–45)
HDLC SERPL-MCNC: 75 MG/DL
HGB BLD-MCNC: 14.7 G/DL (ref 11.7–15.5)
IRON SATN MFR SERPL: 25 % (CALC) (ref 16–45)
IRON SERPL-MCNC: 95 MCG/DL (ref 45–160)
LABORATORY COMMENT REPORT: ABNORMAL
LDLC SERPL CALC-MCNC: 119 MG/DL (CALC)
MCH RBC QN AUTO: 29.3 PG (ref 27–33)
MCHC RBC AUTO-ENTMCNC: 32.7 G/DL (ref 32–36)
MCV RBC AUTO: 89.8 FL (ref 80–100)
NONHDLC SERPL-MCNC: 140 MG/DL (CALC)
NUCLEOSOME AB SER IA-ACNC: ABNORMAL AI
PLATELET # BLD AUTO: 264 THOUSAND/UL (ref 140–400)
PMV BLD REES-ECKER: 10.5 FL (ref 7.5–12.5)
POTASSIUM SERPL-SCNC: 4.6 MMOL/L (ref 3.5–5.3)
PROT SERPL-MCNC: 6.2 G/DL (ref 6.1–8.1)
RBC # BLD AUTO: 5.01 MILLION/UL (ref 3.8–5.1)
RHEUMATOID FACT SERPL-ACNC: <10 IU/ML
RIBOSOMAL P AB SER-ACNC: ABNORMAL AI
SODIUM SERPL-SCNC: 141 MMOL/L (ref 135–146)
TIBC SERPL-MCNC: 374 MCG/DL (CALC) (ref 250–450)
TRIGL SERPL-MCNC: 103 MG/DL
TSH SERPL-ACNC: 1.4 MIU/L
URATE SERPL-MCNC: 4 MG/DL (ref 2.5–7)
VIT B12 SERPL-MCNC: 377 PG/ML (ref 200–1100)
WBC # BLD AUTO: 5.6 THOUSAND/UL (ref 3.8–10.8)

## 2025-05-15 DIAGNOSIS — M25.551 BILATERAL HIP PAIN: Primary | ICD-10-CM

## 2025-05-15 DIAGNOSIS — M25.552 BILATERAL HIP PAIN: Primary | ICD-10-CM

## 2025-05-16 DIAGNOSIS — F41.1 GENERALIZED ANXIETY DISORDER: ICD-10-CM

## 2025-05-16 RX ORDER — PROPRANOLOL HYDROCHLORIDE 10 MG/1
30 TABLET ORAL DAILY PRN
Qty: 270 TABLET | Refills: 1 | OUTPATIENT
Start: 2025-05-16 | End: 2026-05-16

## 2025-05-19 ENCOUNTER — APPOINTMENT (OUTPATIENT)
Dept: ORTHOPEDIC SURGERY | Age: 50
End: 2025-05-19
Payer: COMMERCIAL

## 2025-05-19 ENCOUNTER — HOSPITAL ENCOUNTER (OUTPATIENT)
Dept: RADIOLOGY | Facility: EXTERNAL LOCATION | Age: 50
Discharge: HOME | End: 2025-05-19

## 2025-05-19 VITALS — WEIGHT: 293 LBS | BODY MASS INDEX: 44.41 KG/M2 | HEIGHT: 68 IN

## 2025-05-19 DIAGNOSIS — M16.12 PRIMARY OSTEOARTHRITIS OF LEFT HIP: Primary | ICD-10-CM

## 2025-05-19 DIAGNOSIS — M25.552 BILATERAL HIP PAIN: ICD-10-CM

## 2025-05-19 DIAGNOSIS — M25.551 BILATERAL HIP PAIN: ICD-10-CM

## 2025-05-19 PROCEDURE — 3008F BODY MASS INDEX DOCD: CPT | Performed by: EMERGENCY MEDICINE

## 2025-05-19 PROCEDURE — 99204 OFFICE O/P NEW MOD 45 MIN: CPT | Performed by: EMERGENCY MEDICINE

## 2025-05-19 PROCEDURE — 1036F TOBACCO NON-USER: CPT | Performed by: EMERGENCY MEDICINE

## 2025-05-19 PROCEDURE — 20611 DRAIN/INJ JOINT/BURSA W/US: CPT | Performed by: EMERGENCY MEDICINE

## 2025-05-19 RX ORDER — LIDOCAINE HYDROCHLORIDE 10 MG/ML
4 INJECTION, SOLUTION INFILTRATION; PERINEURAL
Status: COMPLETED | OUTPATIENT
Start: 2025-05-19 | End: 2025-05-19

## 2025-05-19 RX ORDER — METHYLPREDNISOLONE ACETATE 40 MG/ML
80 INJECTION, SUSPENSION INTRA-ARTICULAR; INTRALESIONAL; INTRAMUSCULAR; SOFT TISSUE
Status: COMPLETED | OUTPATIENT
Start: 2025-05-19 | End: 2025-05-19

## 2025-05-19 RX ADMIN — METHYLPREDNISOLONE ACETATE 80 MG: 40 INJECTION, SUSPENSION INTRA-ARTICULAR; INTRALESIONAL; INTRAMUSCULAR; SOFT TISSUE at 09:25

## 2025-05-19 RX ADMIN — LIDOCAINE HYDROCHLORIDE 4 ML: 10 INJECTION, SOLUTION INFILTRATION; PERINEURAL at 09:25

## 2025-05-19 NOTE — PROGRESS NOTES
Subjective    Patient ID: Gonzalez Jason is a 50 y.o. female.    Chief Complaint: Pain of the Left Hip (Referral from Dr. Mckeon bilateral hip pain, Left hip worse, right hip pain coming from her back. Left hip/leg feels weak and there is a stabbing pain. No known injury or prior surgery. 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  She states that she has acute on chronic lower back pain and has been managed at the [facility] for both plantar fasciitis and degenerative lumbosacral disease.  She has been doing physical therapy but states that 3 weeks ago she started to develop left groin pain.  She tries to avoid NSAIDs so she was prescribed a Medrol Dosepak that did not help that much.  She takes Tylenol for breakthrough pain.  She has a little bit of weakness in the left leg with decreased range of motion.  She would like to avoid surgery if at all possible.  Her BMI is 45.61.  She was referred here by her PCP, Dr. Mckeon for conservative evaluation and management.        Objective   Right Hip Exam   Right hip exam is normal.       Left Hip Exam     Tenderness   The patient is experiencing tenderness in the greater trochanter (Some discomfort laterally with deep palpation but no point tenderness.).    Range of Motion   Abduction:  abnormal   Adduction:  abnormal   Extension:  abnormal   Flexion:  abnormal   External rotation:  abnormal   Internal rotation: abnormal     Muscle Strength   The patient has normal left hip strength.   Abduction: 5/5   Adduction: 5/5   Flexion: 5/5     Tests   KRYSTIAN: positive    Other   Erythema: absent  Sensation: normal    Comments:  Positive FADIR.            Image Results:  XR hip left with pelvis when performed 2 or 3 views  Narrative: Interpreted By:  Kelli  Robson,   STUDY:  XR HIP LEFT WITH PELVIS WHEN PERFORMED 2 OR 3 VIEWS; ;  5/8/2025 8:25  am      INDICATION:  Signs/Symptoms:hip pain.      ,M25.551 Pain in right hip,M25.552 Pain in left hip      COMPARISON:  None.      ACCESSION NUMBER(S):  [id number]      ORDERING CLINICIAN:  MARIAELENA GUERRA      TECHNIQUE:  Single view pelvis and two views of the left hip      FINDINGS:  Left hip demonstrates osteophytosis about the acetabular rim.  Osteoarthritic degenerative changes demonstrated. Cortical and  trabecular lines are maintained in the left femoral head and neck.  Proximal left femoral shafts are otherwise unremarkable.      Osseous pelvis demonstrates intact iliopectineal and ilioischial  lines.                      Impression: 1. Mild osteoarthritic degenerative changes of the left hip with  prominent osteophytosis about the acetabular rim.          MACRO:  None      Signed by: Robson Pereira 5/9/2025 11:04 AM  Dictation workstation:   [id number]    X-rays of the left hip and pelvis were reviewed and interpreted by me on 5/19/2025.  Overall I agree with the radiologist's findings and interpretations    Patient ID: Gonzalez Jason is a 50 y.o. female.    L Inj/Asp: L hip joint on 5/19/2025 9:25 AM  Indications: pain  Details: 20 G needle, ultrasound-guided anterior approach  Medications: 80 mg methylPREDNISolone acetate 40 mg/mL; 4 mL lidocaine 10 mg/mL (1 %)  Outcome: tolerated well, no immediate complications  Procedure, treatment alternatives, risks and benefits explained, specific risks discussed. Consent was given by the patient. Immediately prior to procedure a time out was called to verify the correct patient, procedure, equipment, support staff and site/side marked as required. Patient was prepped and draped in the usual sterile fashion.           Assessment/Plan   Encounter Diagnoses:  Primary osteoarthritis of left hip    Bilateral hip pain    Orders Placed This Encounter    L Inj/Asp     No  follow-ups on file.    We discussed her treatment options and eventually decided to perform a left hip cortisone injection under ultrasound guidance.  Hopefully this injection will be both diagnostic and therapeutic.  She is going to continue to avoid NSAIDs and can take 1000 mg of Tylenol up to 3 times daily.  The patient tolerated the procedure well today and activity modifications were reviewed.  She is going to follow-up with me as needed moving forward and knows that we could potentially repeat this injection as often as every 3 months.  We also discussed potentially trying gel versus PRP injections in the future or obtaining additional imaging prior to her surgical referral.    ** Please excuse any errors in grammar or translation related to this dictation. Voice recognition software was utilized to prepare this document. **       Mike Sethi MD  [facility]

## 2025-05-30 ENCOUNTER — TELEMEDICINE (OUTPATIENT)
Dept: BEHAVIORAL HEALTH | Facility: CLINIC | Age: 50
End: 2025-05-30
Payer: COMMERCIAL

## 2025-05-30 DIAGNOSIS — F32.1 MODERATE MAJOR DEPRESSION (MULTI): ICD-10-CM

## 2025-05-30 DIAGNOSIS — F41.1 GENERALIZED ANXIETY DISORDER: Primary | ICD-10-CM

## 2025-05-30 DIAGNOSIS — F42.8 OBSESSIVE THINKING: ICD-10-CM

## 2025-05-30 DIAGNOSIS — G47.9 SLEEP DIFFICULTIES: ICD-10-CM

## 2025-05-30 DIAGNOSIS — F90.0 ATTENTION DEFICIT HYPERACTIVITY DISORDER (ADHD), PREDOMINANTLY INATTENTIVE TYPE: ICD-10-CM

## 2025-05-30 PROCEDURE — 99214 OFFICE O/P EST MOD 30 MIN: CPT | Performed by: NURSE PRACTITIONER

## 2025-05-30 ASSESSMENT — ANXIETY QUESTIONNAIRES
5. BEING SO RESTLESS THAT IT IS HARD TO SIT STILL: SEVERAL DAYS
6. BECOMING EASILY ANNOYED OR IRRITABLE: MORE THAN HALF THE DAYS
7. FEELING AFRAID AS IF SOMETHING AWFUL MIGHT HAPPEN: MORE THAN HALF THE DAYS
4. TROUBLE RELAXING: MORE THAN HALF THE DAYS
2. NOT BEING ABLE TO STOP OR CONTROL WORRYING: MORE THAN HALF THE DAYS
GAD7 TOTAL SCORE: 13
1. FEELING NERVOUS, ANXIOUS, OR ON EDGE: SEVERAL DAYS
IF YOU CHECKED OFF ANY PROBLEMS ON THIS QUESTIONNAIRE, HOW DIFFICULT HAVE THESE PROBLEMS MADE IT FOR YOU TO DO YOUR WORK, TAKE CARE OF THINGS AT HOME, OR GET ALONG WITH OTHER PEOPLE: SOMEWHAT DIFFICULT
3. WORRYING TOO MUCH ABOUT DIFFERENT THINGS: NEARLY EVERY DAY

## 2025-05-30 ASSESSMENT — PATIENT HEALTH QUESTIONNAIRE - PHQ9
1. LITTLE INTEREST OR PLEASURE IN DOING THINGS: MORE THAN HALF THE DAYS
6. FEELING BAD ABOUT YOURSELF - OR THAT YOU ARE A FAILURE OR HAVE LET YOURSELF OR YOUR FAMILY DOWN: MORE THAN HALF THE DAYS
5. POOR APPETITE OR OVEREATING: SEVERAL DAYS
8. MOVING OR SPEAKING SO SLOWLY THAT OTHER PEOPLE COULD HAVE NOTICED. OR THE OPPOSITE, BEING SO FIGETY OR RESTLESS THAT YOU HAVE BEEN MOVING AROUND A LOT MORE THAN USUAL: NOT AT ALL
2. FEELING DOWN, DEPRESSED OR HOPELESS: MORE THAN HALF THE DAYS
9. THOUGHTS THAT YOU WOULD BE BETTER OFF DEAD, OR OF HURTING YOURSELF: NOT AT ALL
3. TROUBLE FALLING OR STAYING ASLEEP OR SLEEPING TOO MUCH: SEVERAL DAYS
4. FEELING TIRED OR HAVING LITTLE ENERGY: SEVERAL DAYS
10. IF YOU CHECKED OFF ANY PROBLEMS, HOW DIFFICULT HAVE THESE PROBLEMS MADE IT FOR YOU TO DO YOUR WORK, TAKE CARE OF THINGS AT HOME, OR GET ALONG WITH OTHER PEOPLE: SOMEWHAT DIFFICULT
7. TROUBLE CONCENTRATING ON THINGS, SUCH AS READING THE NEWSPAPER OR WATCHING TELEVISION: SEVERAL DAYS

## 2025-05-30 NOTE — PROGRESS NOTES
"Outpatient Psychiatry    Subjective   Gonzalez Jason, a 50 y.o. female, presenting to Psychiatry for evaluation.  Patient is referred by Teresa Mckeon MD \"I need to see you more often, as this person with disability, who is not my normal person in charge of my FMLA/time off, wanted to know why I am not seeing you as much. My usual person was on vacation, and I had called her today about this as she is back, and I left her a voice message. I am seeing my therapist weekly, and I told her that my work is so stressful, with how people are so condescending and nasty and mistreat me, and I needed this time off. Having been off has help me de-stress me, and I can already feel how much more relaxed and not on edge or in pain with my muscles being so tight. Granted my daughter had a pulmonary clot while in [place] last week, and once home and after her taking her final exam, she begged me to take her to the hospital, and since she was discharged I am right now injecting her with blood thinners. It has been so stressful for me. This woman just doesn't know what it is like to be in my shoes, but not being at work, is helping me so much. I know that I can feel grounded and be ready to go back to work and handle that stress that is waiting for me, once this month is over.\"    Virtual Consent    An interactive audio and video telecommunication system which permits real time communications between the patient (at home) and provider (at [facility] office) was utilized to provide this telehealth service.     Verbal consent was requested and obtained from Gonzalez Jason on this date, 05/30/2025 for a telehealth visit.      HPI:    Present Illness - anxiety     Characteristics/Recent psychiatric symptoms (pertinent positives and negatives) - reports with getting FMLA off from work, she has felt it easier getting up in the morning, and no longer feels dread facing the day ahead. No longer feeling a tightness in her chest/feeling " panicked about what is going to happen to her. Reports feeling calmer, able to do housework, go for walks, not feeling constantly pressured to 'get things done all the time, every waking second at work that then leaves me stressed out at home. This achiness that never ends. I now get to take the time to take care of my son and his needs, as he is a special needs kid.' Reports what is still however trigging her anhedonia and feeling sad, is her worrying and feeling hopeless with trying to help her son (who is trans) in dealing with his own depression and anxiety, about his life and the political issues unfolding in the country, but also feeling guilty for taking time off from work, yet with therapy realizes that she needed to take the time off and her reasons why she needed FMLA specifically is none of works business. And then feeling guilty yet hurt by the language and attitude that 'the lady at [place] copped with me. That after 30 years of working for this company, that I am belittled constantly and how I am treated, is so not fair and that because of this situation I would probably not get paid. 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 Sleep lately is interrupted d/t worrying lately about her daughter's recent health scare, and then dreaming about the negative issues lingering back at her job that she knows awaits her, but also the constant underlying worry and sadness for her son's own emotional well-being. Reports sleep however is up now to 6 hours total, about 1.5-2 hours more than what she normally gets when she is working. Admits to still overeating but not emotional eating, and finds herself d/t delayed eating as she can be stuck in the moment with having to take care of issues (recent hospital visit with daughter took all of her focus away from remembering to eat food until much later). Feels her Adderall  is doing well with maintaining her ADHD, and it now lasts her the appropriate amount of time for her focus. Notices overall energy levels are up, and not feeling as much mentally or physically fatigued, even taking into consideration her being at the hospital for her daughter's situation.     Onset/timeframe - 4 weeks  Type - anxiety, depression, ADHD  Duration - daily  Aggravating and/or relieving factors/triggers - getting FMLA has helped alleviated her stress at work, but is currently upset with attitude by one person at FMLA company.  Treatment and treatment changes (new meds, dosage increases or decreases, med compliance, therapy frequency, etc.) (Past and Recent) - See below.    Issues: Denies SI/HI/AVH currently    Is doing EMDR with her therapist and has found it to be helpful.     Psychiatric Review Of Systems:  Depressive Symptoms: anhedonia, appetite increased, concentration, energy, guilt, hopeless, interest, sleep decreased , and tearfulness  Manic Symptoms: negative  Anxiety Symptoms: General Anxiety Disorder (PIETRO)PIETRO Behaviors: difficult to control worry, excessive anxiety/worry, difficulty concentrating, easily fatigued, restlessness, sleep disturbance, and dread and Obsessive Compulsive Disorder (OCD)OCD Behaviors: repetitive thoughts  Psychotic Symptoms: negative  Other Symptoms:    Current Medications:    Current Outpatient Medications:     amphetamine-dextroamphetamine XR (Adderall XR) 30 mg 24 hr capsule, Take 1 capsule (30 mg) by mouth once daily in the morning. Do not crush or chew. Do not fill before May 26, 2025., Disp: 30 capsule, Rfl: 0    beta carotene (vitamin A) 3,000 mcg (10,000 unit) capsule, Take 1 capsule (10,000 Units) by mouth once daily., Disp: , Rfl:     cholecalciferol (Vitamin D3) 25 MCG (1000 UT) tablet, Take 2 tablets (2,000 Units) by mouth once daily., Disp: , Rfl:     DULoxetine (Cymbalta) 30 mg DR capsule, Take 1 capsule (30 mg) by mouth once daily. Do not crush or chew.,  Disp: 90 capsule, Rfl: 3    DULoxetine (Cymbalta) 60 mg DR capsule, Take 1 capsule (60 mg) by mouth once daily. Do not crush or chew., Disp: 90 capsule, Rfl: 3    fexofenadine (Allegra) 180 mg tablet, Take 1 tablet (180 mg) by mouth once daily as needed (allergies)., Disp: , Rfl:     foot care products pad, 1 Application once daily as needed (plantar's fasciatis)., Disp: , Rfl:     nutritional supplement-fiber (Quinoa-Kale-Hemp) liquid, Apply 1 Application topically once daily as needed (arthritic pain)., Disp: , Rfl:     pantoprazole (ProtoNix) 40 mg EC tablet, Take 1 tablet (40 mg) by mouth 2 times a day., Disp: , Rfl:     propranolol (Inderal) 10 mg tablet, TAKE 3 TABLETS (30 MG) BY MOUTH ONCE DAILY AS NEEDED (PHYSICAL ANXIETY SYMPTOM MANAGEMENT)., Disp: 90 tablet, Rfl: 3    tirzepatide, weight loss, (Zepbound) 2.5 mg/0.5 mL injection, Inject 2.5 mg under the skin every 7 days., Disp: 4 each, Rfl: 2    vitamin B complex tablet, Take 1 tablet by mouth once daily., Disp: , Rfl:     zinc gluconate 50 mg tablet, Take 1 tablet by mouth once daily., Disp: , Rfl:     amitriptyline (Elavil) 10 mg tablet, Take 5 tablets (50 mg) by mouth as needed at bedtime for sleep. Can take up to 5 tablets (starting initially at 1 tablet) for sleep., Disp: 300 tablet, Rfl: 0    amphetamine-dextroamphetamine XR (Adderall XR) 30 mg 24 hr capsule, Take 1 capsule (30 mg) by mouth once daily in the morning. Do not crush or chew. Do not fill before April 26, 2025., Disp: 30 capsule, Rfl: 0    zolpidem (Ambien) 5 mg tablet, Take 1 tablet (5 mg) by mouth as needed at bedtime for sleep. (Patient not taking: Reported on 5/6/2025), Disp: 30 tablet, Rfl: 1    Medical History:  Past Medical History:   Diagnosis Date    ADD (attention deficit disorder)     Anxiety     Depression     GERD (gastroesophageal reflux disease)     HTN (hypertension)     Liver hemangioma        Substance Use History:  Tobacco use: former  Use of alcohol: 1-2 drinks a  year  Use of caffeine: coffee 1-2 /day  Use of other substances: denies  Legal consequences of substance use: n/a  Substance use disorder treatment: n/a    Record Review: brief     Medical Review Of Systems:  Behavioral/Psych: positive for anxiety and depression    OARRS:  Js Carrillo, APRN-CNP on 5/30/2025  3:47 PM  I have personally reviewed the OARRS report for Gonzalez Jason. I have considered the risks of abuse, dependence, addiction and diversion    Is the patient prescribed a combination of a benzodiazepine and opioid?  No    Last Urine Drug Screen / ordered today: Yes  Recent Results (from the past 8760 hours)   Amphetamine Confirm, Urine    Collection Time: 03/24/25 11:55 AM   Result Value Ref Range    AMPHETAMINE 1,788 (H) <200 ng/mL    METHAMPHETAMINE NEGATIVE <200 ng/mL    PHENTERMINE NEGATIVE <200 ng/mL    MDA NEGATIVE <200 ng/mL    MDMA NEGATIVE <200 ng/mL    MDEA NEGATIVE <200 ng/mL   Drug Screen, Urine With Reflex to Confirmation    Collection Time: 03/24/25 11:55 AM   Result Value Ref Range    Amphetamines POSITIVE (A) <500 ng/mL    Amphetamine 1,670 (H) <250 ng/mL    Methamphetamine NEGATIVE <250 ng/mL    Amphetamines Comments      Barbiturates NEGATIVE <300 ng/mL    Benzodiazepines NEGATIVE <100 ng/mL    Cocaine Metabolite NEGATIVE <150 ng/mL    Marijuana Metabolite NEGATIVE <20 ng/mL    Methadone Metabolite NEGATIVE <100 ng/mL    Opiates NEGATIVE <100 ng/mL    Oxycodone NEGATIVE <100 ng/mL    Phencyclidine NEGATIVE <25 ng/mL    Creatinine 29.7 > or = 20.0 mg/dL    pH 7.4 4.5 - 9.0    Oxidant NEGATIVE <200 mcg/mL    Notes and Comments       Results are as expected.         Controlled Substance Agreement:  Date of the Last Agreement: 12/12/2024  Reviewed Controlled Substance Agreement including but not limited to the benefits, risks, and alternatives to treatment with a Controlled Substance medication(s).    Stimulants:   What is the patient's goal of therapy? Stable ADHD  Is this being achieved  with current treatment? no    Activities of Daily Living:   Is your overall impression that this patient is benefiting (symptom reduction outweighs side effects) from stimulant therapy? no     1. Physical Functioning: Better  2. Family Relationship: Better  3. Social Relationship: Better  4. Mood: Better  5. Sleep Patterns: Better  6. Overall Function: Better      Objective   Mental Status Exam  Appearance: Well-groomed  Attitude: Cooperative, less distracted and engaged in conversation.  Behavior: Good eye contact.   Motor Activity: No psychomotor agitation. No abnormal movements, tremors or tics. No evidence of extrapyramidal symptoms or tardive dyskinesia.  Speech: Normal rate, volume and prosody.  Mood: 'better, calm, not as depressed, but can still be anxious'  Affect: Euthymic, but constricted at times, less anxious yet mood congruent.  Thought Process: Linear, logical, and goal-directed but at times racing, obsessive thoughts. No loose associations or gross thought disorganization.  Thought Content: Denied current suicidal ideation or thoughts of harm to self, denied homicidal ideation or thoughts of harm to others. No delusional thinking elicited. No perseverations or obsessions identified. Being off from work has helped de-stress herself and while she is now stressed about her son's mental health and her daughter's physical health issues, she feels the time off has allowed her to be mentally prepared to go back to work soon. Admits to being annoyed by attitude of FMLA person who came across as accusatory of why she couldn't handle stress at work. Work stress has always been horrible/lack of recognition, and trying to balance work/life balance was gone - leaving her feeling burned out. Still trying not to let politics weigh in, but it is a concern underlying it all.  Perception: Did not endorse auditory or visual hallucinations, did not appear to be responding to hallucinatory stimuli.   Cognition: Alert,  oriented x3. Preserved attention span and concentration, recent and remote memory. Adequate fund of knowledge. No deficits in language.   Insight: Intact, in regards to understanding mental health condition  Judgement: Intact      PIETRO-7/PHQ-9 scores reviewed: 13, 10 compared to 21, 24 reflecting significant improvement in both anxiety and depression.     Other Objective Information:  Labs ordered and pending.      Risk Assessment:  Risk of harm to self: Low Risk -- Risk factors include: No significant risk factors identified on screening Protective factors include:Denies current suicidal ideation and Denies history of suicide attempts     Risk of harm to others: Low Risk - Risk factors include: No significant risk factors identified on screening. Protective factors include: Lack of known history of harm to others  and Lack of known history of violent ideation     PSYCHOTHERAPY:  Plan: goals, type of therapy/modality, mental status when leaving, dx, time, continues seeing therapist     There are no diagnoses linked to this encounter.       Plan/Recommendations:  Medications: Continues taking Amitriptyline (Elavil) 10-50mg at bedtime (for sleep and adjunct for depression). Continues Cymbalta DR 90mg every day. Continues taking Adderall XR 30mg every day. Continues taking Propranolol 10mg-20mg as needed for anxiety management (test taking, presentation anxiety, generalized anxiety) - physiological symptom management. Monitor HR closely, and make sure it does not drop below 75 BPM.   Orders: With being on FMLA, she has found herself being able to take better care of herself, and able to decompress and reset herself mentally, and not feel burned out at work, as she has been able to do better self-care, even though she has had stress with worrying about her son's emotional/mental well being ongoing and a recent scare with her daughter's health. Admits not caring for, if not getting upset by the attitude of an employee at  the FMLA company who copped an attitude with her (covering for the person who was assigned to her case, and was on vacation) recently, but found herself being ready to return to work after having the month off, and at least having intermittent FMLA available to her going forward. No changes to medications or to treatment plan, continues to see her therapist and will be seen again in a month, but doing therapy is the more important component here.   Follow up: 06/23  Call  Psychiatry at [phone] with issues.  For [facility] residents, [employer] is a 24/7 hotline you can call for assistance [[phone]]. Please call 311/350 or go to your closest Emergency Room if you feel unsafe. This includes thoughts of hurting yourself or anyone else, or having other troubles such as hearing voices, seeing visions, or having new and scary thoughts about the people around you.    Review with patient: Treatment plan reviewed with the patient.  Medication risks/benefit reviewed with the patient  Time Spent:    Prep time: 1 min.  Direct patient time: 31 min.  Documentation time: 7 min.  Total time: 39 min.    Js Carrillo, APRN-CNP

## 2025-05-31 RX ORDER — DEXTROAMPHETAMINE SACCHARATE, AMPHETAMINE ASPARTATE MONOHYDRATE, DEXTROAMPHETAMINE SULFATE AND AMPHETAMINE SULFATE 7.5; 7.5; 7.5; 7.5 MG/1; MG/1; MG/1; MG/1
30 CAPSULE, EXTENDED RELEASE ORAL EVERY MORNING
Qty: 30 CAPSULE | Refills: 0 | Status: SHIPPED | OUTPATIENT
Start: 2025-08-24 | End: 2025-09-23

## 2025-05-31 RX ORDER — DEXTROAMPHETAMINE SACCHARATE, AMPHETAMINE ASPARTATE MONOHYDRATE, DEXTROAMPHETAMINE SULFATE AND AMPHETAMINE SULFATE 7.5; 7.5; 7.5; 7.5 MG/1; MG/1; MG/1; MG/1
30 CAPSULE, EXTENDED RELEASE ORAL EVERY MORNING
Qty: 30 CAPSULE | Refills: 0 | Status: SHIPPED | OUTPATIENT
Start: 2025-06-25 | End: 2025-07-25

## 2025-05-31 RX ORDER — AMITRIPTYLINE HYDROCHLORIDE 10 MG/1
50 TABLET, FILM COATED ORAL NIGHTLY PRN
Qty: 300 TABLET | Refills: 0 | Status: SHIPPED | OUTPATIENT
Start: 2025-05-31 | End: 2026-05-31

## 2025-05-31 RX ORDER — DEXTROAMPHETAMINE SACCHARATE, AMPHETAMINE ASPARTATE MONOHYDRATE, DEXTROAMPHETAMINE SULFATE AND AMPHETAMINE SULFATE 7.5; 7.5; 7.5; 7.5 MG/1; MG/1; MG/1; MG/1
30 CAPSULE, EXTENDED RELEASE ORAL EVERY MORNING
Qty: 30 CAPSULE | Refills: 0 | Status: SHIPPED | OUTPATIENT
Start: 2025-07-25 | End: 2025-08-24

## 2025-06-23 ENCOUNTER — APPOINTMENT (OUTPATIENT)
Dept: BEHAVIORAL HEALTH | Facility: CLINIC | Age: 50
End: 2025-06-23
Payer: COMMERCIAL

## 2025-06-23 DIAGNOSIS — F90.2 ATTENTION DEFICIT HYPERACTIVITY DISORDER (ADHD), COMBINED TYPE: ICD-10-CM

## 2025-06-23 DIAGNOSIS — G47.9 SLEEP DIFFICULTIES: ICD-10-CM

## 2025-06-23 DIAGNOSIS — F33.1 MODERATE EPISODE OF RECURRENT MAJOR DEPRESSIVE DISORDER: Primary | ICD-10-CM

## 2025-06-23 DIAGNOSIS — F42.8 OBSESSIVE THINKING: ICD-10-CM

## 2025-06-23 DIAGNOSIS — F41.1 GENERALIZED ANXIETY DISORDER: ICD-10-CM

## 2025-06-23 PROCEDURE — 1036F TOBACCO NON-USER: CPT | Performed by: NURSE PRACTITIONER

## 2025-06-23 PROCEDURE — 99214 OFFICE O/P EST MOD 30 MIN: CPT | Performed by: NURSE PRACTITIONER

## 2025-06-23 ASSESSMENT — ANXIETY QUESTIONNAIRES
7. FEELING AFRAID AS IF SOMETHING AWFUL MIGHT HAPPEN: NEARLY EVERY DAY
2. NOT BEING ABLE TO STOP OR CONTROL WORRYING: MORE THAN HALF THE DAYS
IF YOU CHECKED OFF ANY PROBLEMS ON THIS QUESTIONNAIRE, HOW DIFFICULT HAVE THESE PROBLEMS MADE IT FOR YOU TO DO YOUR WORK, TAKE CARE OF THINGS AT HOME, OR GET ALONG WITH OTHER PEOPLE: SOMEWHAT DIFFICULT
GAD7 TOTAL SCORE: 16
1. FEELING NERVOUS, ANXIOUS, OR ON EDGE: MORE THAN HALF THE DAYS
5. BEING SO RESTLESS THAT IT IS HARD TO SIT STILL: MORE THAN HALF THE DAYS
3. WORRYING TOO MUCH ABOUT DIFFERENT THINGS: NEARLY EVERY DAY
4. TROUBLE RELAXING: MORE THAN HALF THE DAYS
6. BECOMING EASILY ANNOYED OR IRRITABLE: MORE THAN HALF THE DAYS

## 2025-06-23 ASSESSMENT — PATIENT HEALTH QUESTIONNAIRE - PHQ9
6. FEELING BAD ABOUT YOURSELF - OR THAT YOU ARE A FAILURE OR HAVE LET YOURSELF OR YOUR FAMILY DOWN: MORE THAN HALF THE DAYS
1. LITTLE INTEREST OR PLEASURE IN DOING THINGS: SEVERAL DAYS
5. POOR APPETITE OR OVEREATING: MORE THAN HALF THE DAYS
2. FEELING DOWN, DEPRESSED OR HOPELESS: MORE THAN HALF THE DAYS
8. MOVING OR SPEAKING SO SLOWLY THAT OTHER PEOPLE COULD HAVE NOTICED. OR THE OPPOSITE, BEING SO FIGETY OR RESTLESS THAT YOU HAVE BEEN MOVING AROUND A LOT MORE THAN USUAL: NOT AT ALL
3. TROUBLE FALLING OR STAYING ASLEEP OR SLEEPING TOO MUCH: MORE THAN HALF THE DAYS
7. TROUBLE CONCENTRATING ON THINGS, SUCH AS READING THE NEWSPAPER OR WATCHING TELEVISION: MORE THAN HALF THE DAYS
9. THOUGHTS THAT YOU WOULD BE BETTER OFF DEAD, OR OF HURTING YOURSELF: NOT AT ALL
4. FEELING TIRED OR HAVING LITTLE ENERGY: MORE THAN HALF THE DAYS
10. IF YOU CHECKED OFF ANY PROBLEMS, HOW DIFFICULT HAVE THESE PROBLEMS MADE IT FOR YOU TO DO YOUR WORK, TAKE CARE OF THINGS AT HOME, OR GET ALONG WITH OTHER PEOPLE: SOMEWHAT DIFFICULT

## 2025-06-23 NOTE — PROGRESS NOTES
"Outpatient Psychiatry    Subjective   Gonzalez Jason, a 50 y.o. female, presenting to Psychiatry for evaluation.  Patient is referred by Teresa Mckeon MD \"I am back to work as of last work, and it wasn't all that different of an experience. I wrote a note to myself reminding me how worthy I am, and after my boss welcomed me back, within 10 minutes things were crappy. He leads with his working on a project for the past month that was all wrong and that someone, me, had been working on for the past 8 months and had been doing wrong. That is how right out of the gate, I feel like I am doing things wrong, all over again.\"    Virtual Consent    An interactive audio and video telecommunication system which permits real time communications between the patient (at home) and provider (at home office) was utilized to provide this telehealth service.     Verbal consent was requested and obtained from Gonzalez Jason on this date, 06/23/2025 for a telehealth visit.      HPI:    Present Illness - anxiety     Characteristics/Recent psychiatric symptoms (pertinent positives and negatives) - reports being back to work, has been a slight struggle and is making her wonder what should she do with her career. Her boss is back to his old tricks and pretending like he cares but treads all over her and puts her down, and 'I feel like it is going nowhere'. Admits even if transferring within the company to a similar position, won't be a solid move as there is a stigma attached to it, but currently needs to starts looking for another job, while being wary of not leaving now for financial reasons. Reports sleep which had improved while on FMLA, had started to revert with more interruptions again, but not as many as before. reports with getting FMLA off from work, she has felt it easier getting up in the morning, and no longer feels dread facing the day ahead. Reports leading up to returning to work, she was feeling stomach aches and headaches " with increased anticipatory anxiety, with having to deal with her control freak/narcissist for a boss. Admits both anxiety and depressed have gone back up since being back to work, but is aware of this and making effort to monitor her feelings. 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 Reports having fought [employer] to get paid her disability and went through her company's department that she can file a complaint through after she got angry with how [employer] was just being 'dicks' about denying her disability, and it was then approved. Reports sleep is only about 5 hours a night, with interrupted sleep 2-3x a night and taking her about 20 minutes to go back to sleep. Feels her Adderall continues to manage her ADHD symptoms. Admits in the AM, right after waking up, she has very low energy and fatigued, but after she takes the Adderall, energy levels rise and is no longer mentally/physically fatigued. Appetite has been 'ok, but there is a lot of night eating. Stress response, and I will eat sometimes overnight and I know I shouldn't.' Reports ruminating a lot about how much she hates her job, but it is d/t the toxic nature of how her boss treats her. Denies racing, obsessive or intrusive thoughts.     Onset/timeframe - 2 weeks  Type - anxiety, depression  Duration - daily  Aggravating and/or relieving factors/triggers - fighting disability to get paid, and being back to work has caused her to get stressed again.  Treatment and treatment changes (new meds, dosage increases or decreases, med compliance, therapy frequency, etc.)  (Past and Recent) - See below.    Issues: Eladioies SI/HI/AVH currently    Is doing EMDR with her therapist and has found it to be helpful.     Psychiatric Review Of Systems:  Depressive Symptoms: anhedonia, appetite increased, concentration, energy, guilt, hopeless, and sleep decreased   Manic Symptoms: negative  Anxiety Symptoms: General Anxiety Disorder (PIETRO)PIETRO Behaviors: difficult to control worry, excessive anxiety/worry, difficulty concentrating, easily fatigued, irritability, restlessness, sleep disturbance, and dread and Obsessive Compulsive Disorder (OCD)OCD Behaviors: negative  Psychotic Symptoms: negative  Other Symptoms:    Current Medications:    Current Outpatient Medications:     amitriptyline (Elavil) 10 mg tablet, Take 5 tablets (50 mg) by mouth as needed at bedtime for sleep. Can take up to 5 tablets (starting initially at 1 tablet) for sleep., Disp: 300 tablet, Rfl: 0    amphetamine-dextroamphetamine XR (Adderall XR) 30 mg 24 hr capsule, Take 1 capsule (30 mg) by mouth once daily in the morning. Do not crush or chew. Do not fill before May 26, 2025., Disp: 30 capsule, Rfl: 0    [START ON 6/25/2025] amphetamine-dextroamphetamine XR (Adderall XR) 30 mg 24 hr capsule, Take 1 capsule (30 mg) by mouth once daily in the morning. Do not crush or chew. Do not fill before June 25, 2025., Disp: 30 capsule, Rfl: 0    [START ON 7/25/2025] amphetamine-dextroamphetamine XR (Adderall XR) 30 mg 24 hr capsule, Take 1 capsule (30 mg) by mouth once daily in the morning. Do not crush or chew. Do not fill before July 25, 2025., Disp: 30 capsule, Rfl: 0    [START ON 8/24/2025] amphetamine-dextroamphetamine XR (Adderall XR) 30 mg 24 hr capsule, Take 1 capsule (30 mg) by mouth once daily in the morning. Do not crush or chew. Do not fill before August 24, 2025., Disp: 30 capsule, Rfl: 0    beta carotene (vitamin A) 3,000 mcg (10,000 unit) capsule, Take 1 capsule (10,000 Units) by mouth once daily., Disp: , Rfl:      cholecalciferol (Vitamin D3) 25 MCG (1000 UT) tablet, Take 2 tablets (2,000 Units) by mouth once daily., Disp: , Rfl:     DULoxetine (Cymbalta) 30 mg DR capsule, Take 1 capsule (30 mg) by mouth once daily. Do not crush or chew., Disp: 90 capsule, Rfl: 3    DULoxetine (Cymbalta) 60 mg DR capsule, Take 1 capsule (60 mg) by mouth once daily. Do not crush or chew., Disp: 90 capsule, Rfl: 3    fexofenadine (Allegra) 180 mg tablet, Take 1 tablet (180 mg) by mouth once daily as needed (allergies)., Disp: , Rfl:     foot care products pad, 1 Application once daily as needed (plantar's fasciatis)., Disp: , Rfl:     nutritional supplement-fiber (Quinoa-Kale-Hemp) liquid, Apply 1 Application topically once daily as needed (arthritic pain)., Disp: , Rfl:     pantoprazole (ProtoNix) 40 mg EC tablet, Take 1 tablet (40 mg) by mouth 2 times a day., Disp: , Rfl:     propranolol (Inderal) 10 mg tablet, TAKE 3 TABLETS (30 MG) BY MOUTH ONCE DAILY AS NEEDED (PHYSICAL ANXIETY SYMPTOM MANAGEMENT)., Disp: 90 tablet, Rfl: 3    tirzepatide, weight loss, (Zepbound) 2.5 mg/0.5 mL injection, Inject 2.5 mg under the skin every 7 days., Disp: 4 each, Rfl: 2    vitamin B complex tablet, Take 1 tablet by mouth once daily., Disp: , Rfl:     zinc gluconate 50 mg tablet, Take 1 tablet by mouth once daily., Disp: , Rfl:     Medical History:  Past Medical History:   Diagnosis Date    ADD (attention deficit disorder)     Anxiety     Depression     GERD (gastroesophageal reflux disease)     HTN (hypertension)     Liver hemangioma        Substance Use History:  Tobacco use: former  Use of alcohol: 1-2 drinks a year  Use of caffeine: coffee 1-2 /day  Use of other substances: denies  Legal consequences of substance use: n/a  Substance use disorder treatment: n/a    Record Review: brief     Medical Review Of Systems:  Behavioral/Psych: positive for anxiety and depression    O[facility]:  NIRANJAN Burgess-CNP on 6/23/2025  8:45 AM  I have personally reviewed the  O[facility] report for Gonzalez Jason. I have considered the risks of abuse, dependence, addiction and diversion    Is the patient prescribed a combination of a benzodiazepine and opioid?  No    Last Urine Drug Screen / ordered today: Yes  Recent Results (from the past 8760 hours)   Amphetamine Confirm, Urine    Collection Time: 03/24/25 11:55 AM   Result Value Ref Range    AMPHETAMINE 1,788 (H) <200 ng/mL    METHAMPHETAMINE NEGATIVE <200 ng/mL    PHENTERMINE NEGATIVE <200 ng/mL    MDA NEGATIVE <200 ng/mL    MDMA NEGATIVE <200 ng/mL    MDEA NEGATIVE <200 ng/mL   Drug Screen, Urine With Reflex to Confirmation    Collection Time: 03/24/25 11:55 AM   Result Value Ref Range    Amphetamines POSITIVE (A) <500 ng/mL    Amphetamine 1,670 (H) <250 ng/mL    Methamphetamine NEGATIVE <250 ng/mL    Amphetamines Comments      Barbiturates NEGATIVE <300 ng/mL    Benzodiazepines NEGATIVE <100 ng/mL    Cocaine Metabolite NEGATIVE <150 ng/mL    Marijuana Metabolite NEGATIVE <20 ng/mL    Methadone Metabolite NEGATIVE <100 ng/mL    Opiates NEGATIVE <100 ng/mL    Oxycodone NEGATIVE <100 ng/mL    Phencyclidine NEGATIVE <25 ng/mL    Creatinine 29.7 > or = 20.0 mg/dL    pH 7.4 4.5 - 9.0    Oxidant NEGATIVE <200 mcg/mL    Notes and Comments       Results are as expected.         Controlled Substance Agreement:  Date of the Last Agreement: 12/12/2024  Reviewed Controlled Substance Agreement including but not limited to the benefits, risks, and alternatives to treatment with a Controlled Substance medication(s).    Stimulants:   What is the patient's goal of therapy? Stable ADHD  Is this being achieved with current treatment? yes    Activities of Daily Living:   Is your overall impression that this patient is benefiting (symptom reduction outweighs side effects) from stimulant therapy? yes     1. Physical Functioning: Better  2. Family Relationship: Better  3. Social Relationship: Better  4. Mood: Worse  5. Sleep Patterns: Worse  6. Overall  Function: Worse      Objective   Mental Status Exam  Appearance: Well-groomed  Attitude: Cooperative, distracted at times but engaged in conversation.  Behavior: Good eye contact.   Motor Activity: No psychomotor agitation. No abnormal movements, tremors or tics. No evidence of extrapyramidal symptoms or tardive dyskinesia.  Speech: Normal rate, volume and prosody, at times perseverative but redirectable.  Mood: 'stressed, anxious, a little down'  Affect: Dysphoric, constricted but reactive, anxious and mood congruent.  Thought Process: Linear, logical, and goal-directed, perseverative, at times racing, obsessive thoughts. No loose associations or gross thought disorganization.  Thought Content: Denied current suicidal ideation or thoughts of harm to self, denied homicidal ideation or thoughts of harm to others. No delusional thinking elicited. No perseverations or obsessions identified. Being back to work, dealing with her toxic boss, and realizing she needs to start looking for another job; fighting with disability insurance to pay her the days off and winning.  Thought Perception: Did not endorse auditory or visual hallucinations, did not appear to be responding to hallucinatory stimuli.   Cognition: Alert, oriented x3. Preserved attention span and concentration, recent and remote memory. Adequate fund of knowledge. No deficits in language.   Insight: Intact, in regards to understanding mental health condition  Judgement: Intact      PIETRO-7/PHQ-9 scores reviewed: 16, 13 compared to 13, 10 reflecting slight increases in both anxiety and depression.     Other Objective Information:  Labs ordered and pending.      Risk Assessment:  Risk of harm to self: Low Risk -- Risk factors include: No significant risk factors identified on screening Protective factors include:Denies current suicidal ideation and Denies history of suicide attempts     Risk of harm to others: Low Risk - Risk factors include: No significant risk  factors identified on screening. Protective factors include: Lack of known history of harm to others  and Lack of known history of violent ideation     PSYCHOTHERAPY:  Plan: goals, type of therapy/modality, mental status when leaving, dx, time, continues seeing therapist     There are no diagnoses linked to this encounter.       Plan/Recommendations:  Medications: Continues taking Amitriptyline (Elavil) 10-50mg at bedtime (for sleep and adjunct for depression). Continues Cymbalta DR 90mg every day. Continues taking Adderall XR 30mg every day. Continues taking Propranolol 10mg-20mg, daily and not as needed for anxiety management.   Orders: Returning back to work and dealing with a misogynist for a boss, has caused her anxiety, depression and stress to rebound but is managing her ability to control it better, since taking FMLA which had helped her ground herself. Admits she needs to find a new job, knowing that it is a toxic place to work in but in the meantime needs to stay put. Overall feels stable in light of her stress, and after reviewing, agreed that there was no need for changes to medications or to treatment plan, and to continue to see her therapist.   Follow up: 08/05  Call  Psychiatry at [phone] with issues.  For [facility] residents, [facility] is a 24/7 hotline you can call for assistance [[phone]]. Please call 284/008 or go to your closest Emergency Room if you feel unsafe. This includes thoughts of hurting yourself or anyone else, or having other troubles such as hearing voices, seeing visions, or having new and scary thoughts about the people around you.    Review with patient: Treatment plan reviewed with the patient.  Medication risks/benefit reviewed with the patient  Time Spent:    Prep time: 1 min.  Direct patient time: 33 min.  Documentation time: 5 min.  Total time: 39 min.    Js Carrillo, APRN-CNP

## 2025-07-06 DIAGNOSIS — R76.8 ANA POSITIVE: Primary | ICD-10-CM

## 2025-08-05 ENCOUNTER — APPOINTMENT (OUTPATIENT)
Dept: BEHAVIORAL HEALTH | Facility: CLINIC | Age: 50
End: 2025-08-05
Payer: COMMERCIAL

## 2025-08-05 DIAGNOSIS — F90.0 ATTENTION DEFICIT HYPERACTIVITY DISORDER (ADHD), PREDOMINANTLY INATTENTIVE TYPE: ICD-10-CM

## 2025-08-05 DIAGNOSIS — F32.1 MODERATE MAJOR DEPRESSION (MULTI): ICD-10-CM

## 2025-08-05 DIAGNOSIS — F42.8 OBSESSIVE THINKING: ICD-10-CM

## 2025-08-05 DIAGNOSIS — F41.1 GENERALIZED ANXIETY DISORDER: Primary | ICD-10-CM

## 2025-08-05 DIAGNOSIS — G47.9 SLEEP DIFFICULTIES: ICD-10-CM

## 2025-08-05 PROCEDURE — 99213 OFFICE O/P EST LOW 20 MIN: CPT | Performed by: NURSE PRACTITIONER

## 2025-08-05 ASSESSMENT — PATIENT HEALTH QUESTIONNAIRE - PHQ9
2. FEELING DOWN, DEPRESSED OR HOPELESS: SEVERAL DAYS
10. IF YOU CHECKED OFF ANY PROBLEMS, HOW DIFFICULT HAVE THESE PROBLEMS MADE IT FOR YOU TO DO YOUR WORK, TAKE CARE OF THINGS AT HOME, OR GET ALONG WITH OTHER PEOPLE: NOT DIFFICULT AT ALL
6. FEELING BAD ABOUT YOURSELF - OR THAT YOU ARE A FAILURE OR HAVE LET YOURSELF OR YOUR FAMILY DOWN: SEVERAL DAYS
4. FEELING TIRED OR HAVING LITTLE ENERGY: SEVERAL DAYS
1. LITTLE INTEREST OR PLEASURE IN DOING THINGS: SEVERAL DAYS
8. MOVING OR SPEAKING SO SLOWLY THAT OTHER PEOPLE COULD HAVE NOTICED. OR THE OPPOSITE, BEING SO FIGETY OR RESTLESS THAT YOU HAVE BEEN MOVING AROUND A LOT MORE THAN USUAL: NOT AT ALL
5. POOR APPETITE OR OVEREATING: SEVERAL DAYS
7. TROUBLE CONCENTRATING ON THINGS, SUCH AS READING THE NEWSPAPER OR WATCHING TELEVISION: NOT AT ALL
3. TROUBLE FALLING OR STAYING ASLEEP OR SLEEPING TOO MUCH: MORE THAN HALF THE DAYS

## 2025-08-05 ASSESSMENT — ANXIETY QUESTIONNAIRES
4. TROUBLE RELAXING: SEVERAL DAYS
1. FEELING NERVOUS, ANXIOUS, OR ON EDGE: MORE THAN HALF THE DAYS
5. BEING SO RESTLESS THAT IT IS HARD TO SIT STILL: SEVERAL DAYS
IF YOU CHECKED OFF ANY PROBLEMS ON THIS QUESTIONNAIRE, HOW DIFFICULT HAVE THESE PROBLEMS MADE IT FOR YOU TO DO YOUR WORK, TAKE CARE OF THINGS AT HOME, OR GET ALONG WITH OTHER PEOPLE: SOMEWHAT DIFFICULT
2. NOT BEING ABLE TO STOP OR CONTROL WORRYING: MORE THAN HALF THE DAYS
3. WORRYING TOO MUCH ABOUT DIFFERENT THINGS: MORE THAN HALF THE DAYS
6. BECOMING EASILY ANNOYED OR IRRITABLE: SEVERAL DAYS
7. FEELING AFRAID AS IF SOMETHING AWFUL MIGHT HAPPEN: SEVERAL DAYS
GAD7 TOTAL SCORE: 10

## 2025-08-05 NOTE — PROGRESS NOTES
"Outpatient Psychiatry    Subjective   Gonzalez Jason, a 50 y.o. female, presenting to Psychiatry for evaluation.  Patient is referred by Teresa Mckeon MD \"I am doing really well. I am seeing my therapist frequently and that is helping me, but also since I was not taking my my Adderall on the weekends, on the weekends, I was obsessing about things even then so I started taking it daily and that helped me a lot.\" (Appt was only 20 minutes long as patient had to run into work early.)    Video-Audio Consent    An interactive video-audio telecommunication system which permits real time communications between the patient (at work) and provider (at [facility] office) was utilized to provide this telehealth service.     Verbal consent was requested and obtained from Gonzalez Jason on this date, 08/05/2025 for a telehealth visit.      HPI:    Present Illness - anxiety     Characteristics/Recent psychiatric symptoms (pertinent positives and negatives) - reports work stress is slightly better as she sat down with her boss and explained her boundaries and he is starting to understand her better, but he has a lot on his plate so is now too distracted to do anything to her. Reports her bigger stressor are her kids are their issues - daughter's health issues, her son's concerns with transphobia/politics but also he needs to get himself moving on getting his own health insurance but he is dragging his feet and that is putting a strain on her. However overall feels both anxiety and depression are improving. 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 By taking the Adderall daily, she had felt calmer, and more self-assured about herself in a long time. Sleep is all over the map, and admits it's the brain keeping her awake at night that impedes her from falling asleep, and knows turning off her phone at night before going to bed, helps letting her fall asleep more quickly but also won't wake her up overnight. On average she is now achieving 6-6.5 hrs a night. Admits can still some fatigue, but Adderall is helping some with her mental fatigue, especially as she has gained back the confidence in her abilities to her do her work and do it really well. Energy levels are fine. 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 Appetite has been 'decent. I can still overeat at times, as I am not eating at the right times, with socializing with others or taking care of the things for my kids, then I am eating off schedule, which is where my problem lies.' Reports overall improvement with slowing down, by 30% less intrusive, racing, obsessive thoughts, especially less negative, self-deprecating thoughts, than before, including ruminating thoughts.     Onset/timeframe - 2 weeks  Type - anxiety, depression  Duration - daily  Aggravating and/or relieving factors/triggers - back at work, and feeling less stressed once she put boundaries in place with boss.  Treatment and treatment changes (new meds, dosage increases or decreases, med compliance, therapy frequency, etc.) (Past and Recent) - See below.    Issues: Denies SI/HI/AVH currently    Is doing EMDR with her therapist and has found it to be helpful.     Psychiatric Review Of Systems:  Depressive Symptoms: anhedonia, appetite increased, concentration, energy, guilt, hopeless, sleep decreased , and sleep increased  Manic Symptoms:  negative  Anxiety Symptoms: General Anxiety Disorder (PIETRO)PIETRO Behaviors: difficult to control worry, excessive anxiety/worry, difficulty concentrating, easily fatigued, irritability, restlessness, sleep disturbance, and dread and Obsessive Compulsive Disorder (OCD)OCD Behaviors: repetitive thoughts, ruminatory thoughts, and racing thoughts  Psychotic Symptoms: negative  Other Symptoms:    Current Medications:    Current Outpatient Medications:     amitriptyline (Elavil) 10 mg tablet, Take 5 tablets (50 mg) by mouth as needed at bedtime for sleep. Can take up to 5 tablets (starting initially at 1 tablet) for sleep., Disp: 300 tablet, Rfl: 0    amphetamine-dextroamphetamine XR (Adderall XR) 30 mg 24 hr capsule, Take 1 capsule (30 mg) by mouth once daily in the morning. Do not crush or chew. Do not fill before July 25, 2025., Disp: 30 capsule, Rfl: 0    [START ON 8/24/2025] amphetamine-dextroamphetamine XR (Adderall XR) 30 mg 24 hr capsule, Take 1 capsule (30 mg) by mouth once daily in the morning. Do not crush or chew. Do not fill before August 24, 2025., Disp: 30 capsule, Rfl: 0    beta carotene (vitamin A) 3,000 mcg (10,000 unit) capsule, Take 1 capsule (10,000 Units) by mouth once daily., Disp: , Rfl:     cholecalciferol (Vitamin D3) 25 MCG (1000 UT) tablet, Take 2 tablets (2,000 Units) by mouth once daily., Disp: , Rfl:     DULoxetine (Cymbalta) 30 mg DR capsule, Take 1 capsule (30 mg) by mouth once daily. Do not crush or chew., Disp: 90 capsule, Rfl: 3    DULoxetine (Cymbalta) 60 mg DR capsule, Take 1 capsule (60 mg) by mouth once daily. Do not crush or chew., Disp: 90 capsule, Rfl: 3    fexofenadine (Allegra) 180 mg tablet, Take 1 tablet (180 mg) by mouth once daily as needed (allergies)., Disp: , Rfl:     foot care products pad, 1 Application once daily as needed (plantar's fasciatis)., Disp: , Rfl:     nutritional supplement-fiber (Quinoa-Kale-Hemp) liquid, Apply 1 Application topically once daily as needed  (arthritic pain)., Disp: , Rfl:     pantoprazole (ProtoNix) 40 mg EC tablet, Take 1 tablet (40 mg) by mouth 2 times a day., Disp: , Rfl:     propranolol (Inderal) 10 mg tablet, TAKE 3 TABLETS (30 MG) BY MOUTH ONCE DAILY AS NEEDED (PHYSICAL ANXIETY SYMPTOM MANAGEMENT)., Disp: 90 tablet, Rfl: 3    tirzepatide, weight loss, (Zepbound) 2.5 mg/0.5 mL injection, Inject 2.5 mg under the skin every 7 days., Disp: 4 each, Rfl: 2    vitamin B complex tablet, Take 1 tablet by mouth once daily., Disp: , Rfl:     zinc gluconate 50 mg tablet, Take 1 tablet by mouth once daily., Disp: , Rfl:     [START ON 9/23/2025] amphetamine-dextroamphetamine XR (Adderall XR) 30 mg 24 hr capsule, Take 1 capsule (30 mg) by mouth once daily in the morning. Do not crush or chew. Do not fill before September 23, 2025., Disp: 30 capsule, Rfl: 0    [START ON 10/23/2025] amphetamine-dextroamphetamine XR (Adderall XR) 30 mg 24 hr capsule, Take 1 capsule (30 mg) by mouth once daily in the morning. Do not crush or chew. Do not fill before October 23, 2025., Disp: 30 capsule, Rfl: 0    [START ON 11/22/2025] amphetamine-dextroamphetamine XR (Adderall XR) 30 mg 24 hr capsule, Take 1 capsule (30 mg) by mouth once daily in the morning. Do not crush or chew. Do not fill before November 22, 2025., Disp: 30 capsule, Rfl: 0    Medical History:  Past Medical History:   Diagnosis Date    ADD (attention deficit disorder)     Anxiety     Depression     GERD (gastroesophageal reflux disease)     HTN (hypertension)     Liver hemangioma        Substance Use History:  Tobacco use: former  Use of alcohol: 1-2 drinks a year  Use of caffeine: coffee 1-2 /day  Use of other substances: denies  Legal consequences of substance use: n/a  Substance use disorder treatment: n/a    Record Review: brief     Medical Review Of Systems:  Behavioral/Psych: positive for anxiety and depression    OARRS:  Js Carrillo, APRN-CNP on 8/5/2025  8:29 AM  I have personally reviewed the OARRS  report for Gonzalez Jason. I have considered the risks of abuse, dependence, addiction and diversion    Is the patient prescribed a combination of a benzodiazepine and opioid?  No    Last Urine Drug Screen / ordered today: Yes  Recent Results (from the past 8760 hours)   Amphetamine Confirm, Urine    Collection Time: 03/24/25 11:55 AM   Result Value Ref Range    AMPHETAMINE 1,788 (H) <200 ng/mL    METHAMPHETAMINE NEGATIVE <200 ng/mL    PHENTERMINE NEGATIVE <200 ng/mL    MDA NEGATIVE <200 ng/mL    MDMA NEGATIVE <200 ng/mL    MDEA NEGATIVE <200 ng/mL   Drug Screen, Urine With Reflex to Confirmation    Collection Time: 03/24/25 11:55 AM   Result Value Ref Range    Amphetamines POSITIVE (A) <500 ng/mL    Amphetamine 1,670 (H) <250 ng/mL    Methamphetamine NEGATIVE <250 ng/mL    Amphetamines Comments      Barbiturates NEGATIVE <300 ng/mL    Benzodiazepines NEGATIVE <100 ng/mL    Cocaine Metabolite NEGATIVE <150 ng/mL    Marijuana Metabolite NEGATIVE <20 ng/mL    Methadone Metabolite NEGATIVE <100 ng/mL    Opiates NEGATIVE <100 ng/mL    Oxycodone NEGATIVE <100 ng/mL    Phencyclidine NEGATIVE <25 ng/mL    Creatinine 29.7 > or = 20.0 mg/dL    pH 7.4 4.5 - 9.0    Oxidant NEGATIVE <200 mcg/mL    Notes and Comments       Results are as expected.         Controlled Substance Agreement:  Date of the Last Agreement: 03/25/2025  Reviewed Controlled Substance Agreement including but not limited to the benefits, risks, and alternatives to treatment with a Controlled Substance medication(s).    Stimulants:   What is the patient's goal of therapy? Stable ADHD  Is this being achieved with current treatment? yes    Activities of Daily Living:   Is your overall impression that this patient is benefiting (symptom reduction outweighs side effects) from stimulant therapy? yes     1. Physical Functioning: Better  2. Family Relationship: Better  3. Social Relationship: Better  4. Mood: Better  5. Sleep Patterns: Same  6. Overall Function:  Better      Objective   Mental Status Exam  Appearance: Well-groomed  Attitude: Cooperative, distracted at times but engaged in conversation.  Behavior: Good eye contact.   Motor Activity: No psychomotor agitation. No abnormal movements, tremors or tics. No evidence of extrapyramidal symptoms or tardive dyskinesia.  Speech: Normal rate, volume and prosody, at times perseverative but redirectable.  Mood: 'anxious, but good'  Affect: Constricted, anxious but mood congruent.  Thought Process: Linear, logical, and goal-directed, perseverative, at times racing, obsessive thoughts. No loose associations or gross thought disorganization.  Thought Content: Denied current suicidal ideation or thoughts of harm to self, denied homicidal ideation or thoughts of harm to others. No delusional thinking elicited. No perseverations or obsessions identified. Being back to work has improved once she set boundaries with boss and standing up for herself and not overextending herself. Reports just worrying about the well-being for her kids in today's political clime.  Thought Perception: Did not endorse auditory or visual hallucinations, did not appear to be responding to hallucinatory stimuli.   Cognition: Alert, oriented x3. Preserved attention span and concentration, recent and remote memory. Adequate fund of knowledge. No deficits in language.   Insight: Intact, in regards to understanding mental health condition  Judgement: Intact      PIETRO-7/PHQ-9 scores reviewed: 10, 8 compared to 16, 13 reflecting improvement in both anxiety and depression.     Other Objective Information:    Office Visit on 05/06/2025   Component Date Value Ref Range Status    WHITE BLOOD CELL COUNT 05/08/2025 5.6  3.8 - 10.8 Thousand/uL Final    RED BLOOD CELL COUNT 05/08/2025 5.01  3.80 - 5.10 Million/uL Final    HEMOGLOBIN 05/08/2025 14.7  11.7 - 15.5 g/dL Final    HEMATOCRIT 05/08/2025 45.0  35.0 - 45.0 % Final    MCV 05/08/2025 89.8  80.0 - 100.0 fL Final     MCH 05/08/2025 29.3  27.0 - 33.0 pg Final    MCHC 05/08/2025 32.7  32.0 - 36.0 g/dL Final    RDW 05/08/2025 13.0  11.0 - 15.0 % Final    PLATELET COUNT 05/08/2025 264  140 - 400 Thousand/uL Final    MPV 05/08/2025 10.5  7.5 - 12.5 fL Final    GLUCOSE 05/08/2025 101 (H)  65 - 99 mg/dL Final    UREA NITROGEN (BUN) 05/08/2025 17  7 - 25 mg/dL Final    CREATININE 05/08/2025 0.52  0.50 - 1.03 mg/dL Final    EGFR 05/08/2025 112  > OR = 60 mL/min/1.73m2 Final    SODIUM 05/08/2025 141  135 - 146 mmol/L Final    POTASSIUM 05/08/2025 4.6  3.5 - 5.3 mmol/L Final    CHLORIDE 05/08/2025 104  98 - 110 mmol/L Final    CARBON DIOXIDE 05/08/2025 29  20 - 32 mmol/L Final    ELECTROLYTE BALANCE 05/08/2025 8  7 - 17 mmol/L (calc) Final    CALCIUM 05/08/2025 8.7  8.6 - 10.4 mg/dL Final    PROTEIN, TOTAL 05/08/2025 6.2  6.1 - 8.1 g/dL Final    ALBUMIN 05/08/2025 3.8  3.6 - 5.1 g/dL Final    BILIRUBIN, TOTAL 05/08/2025 1.0  0.2 - 1.2 mg/dL Final    ALKALINE PHOSPHATASE 05/08/2025 59  37 - 153 U/L Final    AST 05/08/2025 13  10 - 35 U/L Final    ALT 05/08/2025 14  6 - 29 U/L Final    TSH W/REFLEX TO FT4 05/08/2025 1.40  mIU/L Final    HEMOGLOBIN A1c 05/08/2025 5.5  <5.7 % Final    eAG (mg/dL) 05/08/2025 111  mg/dL Final    eAG (mmol/L) 05/08/2025 6.2  mmol/L Final    CHOLESTEROL, TOTAL 05/08/2025 215 (H)  <200 mg/dL Final    HDL CHOLESTEROL 05/08/2025 75  > OR = 50 mg/dL Final    TRIGLYCERIDES 05/08/2025 103  <150 mg/dL Final    LDL-CHOLESTEROL 05/08/2025 119 (H)  mg/dL (calc) Final    CHOL/HDLC RATIO 05/08/2025 2.9  <5.0 (calc) Final    NON HDL CHOLESTEROL 05/08/2025 140 (H)  <130 mg/dL (calc) Final    URIC ACID 05/08/2025 4.0  2.5 - 7.0 mg/dL Final    SED RATE BY MODIFIED WESTERGREN 05/08/2025 6  < OR = 30 mm/h Final    RHEUMATOID FACTOR 05/08/2025 <10  <14 IU/mL Final    FAUZIA SCREEN, IFA 05/08/2025 POSITIVE (A)  NEGATIVE Final    FAUZIA TITER 05/08/2025 1:640 (H)  titer Final    FAUZIA PATTERN 05/08/2025 Nuclear, Centromere (A)   Final    DNA  (DS) ANTIBODY 05/08/2025 1  IU/mL Final    SM ANTIBODY 05/08/2025 <1.0 NEG  <1.0 NEG AI Final    SM/RNP ANTIBODY 05/08/2025 <1.0 NEG  <1.0 NEG AI Final    RNP ANTIBODY 05/08/2025 <1.0 NEG  <1.0 NEG AI Final    CHROMATIN (NUCLEOSOMAL) ANTIBODY 05/08/2025 <1.0 NEG  <1.0 NEG AI Final    SJOGREN'S ANTIBODY (SS-A) 05/08/2025 <1.0 NEG  <1.0 NEG AI Final    SJOGREN'S ANTIBODY (SS-B) 05/08/2025 <1.0 NEG  <1.0 NEG AI Final    SCL-70 ANTIBODY 05/08/2025 <1.0 NEG  <1.0 NEG AI Final    EMILI-1 ANTIBODY 05/08/2025 <1.0 NEG  <1.0 NEG AI Final    CENTROMERE B ANTIBODY 05/08/2025 <1.0 NEG  <1.0 NEG AI Final    RIBOSOMAL P ANTIBODY 05/08/2025 <1.0 NEG  <1.0 NEG AI Final    INTERPRETATION 05/08/2025    Final    VITAMIN B12 05/08/2025 377  200 - 1,100 pg/mL Final    VITAMIN D,25-OH,TOTAL,IA 05/08/2025 36  30 - 100 ng/mL Final    FERRITIN 05/08/2025 8 (L)  16 - 232 ng/mL Final    IRON, TOTAL 05/08/2025 95  45 - 160 mcg/dL Final    IRON BINDING CAPACITY 05/08/2025 374  250 - 450 mcg/dL (calc) Final    % SATURATION 05/08/2025 25  16 - 45 % (calc) Final   Orders Only on 03/24/2025   Component Date Value Ref Range Status    Amphetamines 03/24/2025 POSITIVE (A)  <500 ng/mL Final    Amphetamine 03/24/2025 1,670 (H)  <250 ng/mL Final    Methamphetamine 03/24/2025 NEGATIVE  <250 ng/mL Final    Amphetamines Comments 03/24/2025    Final    Barbiturates 03/24/2025 NEGATIVE  <300 ng/mL Final    Benzodiazepines 03/24/2025 NEGATIVE  <100 ng/mL Final    Cocaine Metabolite 03/24/2025 NEGATIVE  <150 ng/mL Final    Marijuana Metabolite 03/24/2025 NEGATIVE  <20 ng/mL Final    Methadone Metabolite 03/24/2025 NEGATIVE  <100 ng/mL Final    Opiates 03/24/2025 NEGATIVE  <100 ng/mL Final    Oxycodone 03/24/2025 NEGATIVE  <100 ng/mL Final    Phencyclidine 03/24/2025 NEGATIVE  <25 ng/mL Final    Creatinine 03/24/2025 29.7  > or = 20.0 mg/dL Final    pH 03/24/2025 7.4  4.5 - 9.0 Final    Oxidant 03/24/2025 NEGATIVE  <200 mcg/mL Final    Notes and Comments 03/24/2025     Final    AMPHETAMINE 03/24/2025 1,788 (H)  <200 ng/mL Final    METHAMPHETAMINE 03/24/2025 NEGATIVE  <200 ng/mL Final    PHENTERMINE 03/24/2025 NEGATIVE  <200 ng/mL Final    MDA 03/24/2025 NEGATIVE  <200 ng/mL Final    MDMA 03/24/2025 NEGATIVE  <200 ng/mL Final    MDEA 03/24/2025 NEGATIVE  <200 ng/mL Final   Office Visit on 03/10/2025   Component Date Value Ref Range Status    POC Color, Urine 03/10/2025 Yellow  Straw, Yellow, Light-Yellow Final    POC Appearance, Urine 03/10/2025 Clear  Clear Final    POC Glucose, Urine 03/10/2025 NEGATIVE  NEGATIVE mg/dl Final    POC Bilirubin, Urine 03/10/2025 NEGATIVE  NEGATIVE Final    POC Ketones, Urine 03/10/2025 NEGATIVE  NEGATIVE mg/dl Final    POC Specific Gravity, Urine 03/10/2025 1.015  1.005 - 1.035 Final    POC Blood, Urine 03/10/2025 NEGATIVE  NEGATIVE Final    POC PH, Urine 03/10/2025 7.0  No Reference Range Established PH Final    POC Protein, Urine 03/10/2025 NEGATIVE  NEGATIVE mg/dl Final    POC Urobilinogen, Urine 03/10/2025 0.2  0.2, 1.0 EU/DL Final    Poc Nitrite, Urine 03/10/2025 NEGATIVE  NEGATIVE Final    POC Leukocytes, Urine 03/10/2025 TRACE (A)  NEGATIVE Final    CULTURE, URINE, ROUTINE 03/10/2025 SEE NOTE (A)   Final     Labs reflect patient is taking her Adderall as prescribed.      Risk Assessment:  Risk of harm to self: Low Risk -- Risk factors include: No significant risk factors identified on screening Protective factors include:Denies current suicidal ideation and Denies history of suicide attempts     Risk of harm to others: Low Risk - Risk factors include: No significant risk factors identified on screening. Protective factors include: Lack of known history of harm to others  and Lack of known history of violent ideation     PSYCHOTHERAPY:  Plan: goals, type of therapy/modality, mental status when leaving, dx, time, continues seeing therapist     Gonzalez was seen today for mdd (major depressive disorder), anxiety, adhd, follow-up, med management,  sleeping problem and obsessive thinking.  Diagnoses and all orders for this visit:  Generalized anxiety disorder (Primary)  Attention deficit hyperactivity disorder (ADHD), predominantly inattentive type  -     amphetamine-dextroamphetamine XR (Adderall XR) 30 mg 24 hr capsule; Take 1 capsule (30 mg) by mouth once daily in the morning. Do not crush or chew. Do not fill before September 23, 2025.  -     amphetamine-dextroamphetamine XR (Adderall XR) 30 mg 24 hr capsule; Take 1 capsule (30 mg) by mouth once daily in the morning. Do not crush or chew. Do not fill before October 23, 2025.  -     amphetamine-dextroamphetamine XR (Adderall XR) 30 mg 24 hr capsule; Take 1 capsule (30 mg) by mouth once daily in the morning. Do not crush or chew. Do not fill before November 22, 2025.  Moderate major depression (Multi)  Obsessive thinking  Sleep difficulties         Plan/Recommendations:  Medications: Continues taking Amitriptyline (Elavil) 10-50mg at bedtime (for sleep and adjunct for depression). Continues Cymbalta DR 90mg every day. Continues taking Adderall XR 30mg every day. Continues taking Propranolol 10mg-20mg, daily and not as needed for anxiety management.   Orders: Reports being back at work and feeling stable overall as she had put boundaries in place with her boss, standing up for herself more and not allowing herself to be overwhelmed. Reports feeling better about herself and only stressed still about the well-being of her kids, in regards to the political issues in the country and sleep still not being the best. Reviewed and agreed to leaving medications and treatment plan in place. Continues to see her therapist.   Follow up: 01/06/2026  Call  Psychiatry at [phone] with issues.  For [facility] residents, Mobile Crisis is a 24/7 hotline you can call for assistance [[phone]]. Please call 259/106 or go to your closest Emergency Room if you feel unsafe. This includes thoughts of hurting yourself  or anyone else, or having other troubles such as hearing voices, seeing visions, or having new and scary thoughts about the people around you.    Review with patient: Treatment plan reviewed with the patient.  Medication risks/benefit reviewed with the patient  Time Spent:    Prep time: 1 min.  Direct patient time: 20 min.  Documentation time: 7 min.  Total time: 28 min.    Js Carrillo, NIRANJAN-CNP

## 2025-08-06 RX ORDER — DEXTROAMPHETAMINE SACCHARATE, AMPHETAMINE ASPARTATE MONOHYDRATE, DEXTROAMPHETAMINE SULFATE AND AMPHETAMINE SULFATE 7.5; 7.5; 7.5; 7.5 MG/1; MG/1; MG/1; MG/1
30 CAPSULE, EXTENDED RELEASE ORAL EVERY MORNING
Qty: 30 CAPSULE | Refills: 0 | Status: SHIPPED | OUTPATIENT
Start: 2025-10-23 | End: 2025-11-22

## 2025-08-06 RX ORDER — DEXTROAMPHETAMINE SACCHARATE, AMPHETAMINE ASPARTATE MONOHYDRATE, DEXTROAMPHETAMINE SULFATE AND AMPHETAMINE SULFATE 7.5; 7.5; 7.5; 7.5 MG/1; MG/1; MG/1; MG/1
30 CAPSULE, EXTENDED RELEASE ORAL EVERY MORNING
Qty: 30 CAPSULE | Refills: 0 | Status: SHIPPED | OUTPATIENT
Start: 2025-11-22 | End: 2025-12-22

## 2025-08-06 RX ORDER — DEXTROAMPHETAMINE SACCHARATE, AMPHETAMINE ASPARTATE MONOHYDRATE, DEXTROAMPHETAMINE SULFATE AND AMPHETAMINE SULFATE 7.5; 7.5; 7.5; 7.5 MG/1; MG/1; MG/1; MG/1
30 CAPSULE, EXTENDED RELEASE ORAL EVERY MORNING
Qty: 30 CAPSULE | Refills: 0 | Status: SHIPPED | OUTPATIENT
Start: 2025-09-23 | End: 2025-10-23

## 2025-08-22 ENCOUNTER — OFFICE VISIT (OUTPATIENT)
Dept: PRIMARY CARE | Facility: CLINIC | Age: 50
End: 2025-08-22
Payer: COMMERCIAL

## 2025-08-22 VITALS
BODY MASS INDEX: 43.4 KG/M2 | DIASTOLIC BLOOD PRESSURE: 84 MMHG | WEIGHT: 293 LBS | SYSTOLIC BLOOD PRESSURE: 122 MMHG | HEIGHT: 69 IN

## 2025-08-22 DIAGNOSIS — R10.13 EPIGASTRIC PAIN: ICD-10-CM

## 2025-08-22 DIAGNOSIS — Z00.00 REGULAR CHECK-UP: Primary | ICD-10-CM

## 2025-08-22 DIAGNOSIS — Z13.6 SCREENING FOR HEART DISEASE: ICD-10-CM

## 2025-08-22 DIAGNOSIS — Z98.84 H/O GASTRIC BYPASS: ICD-10-CM

## 2025-08-22 PROCEDURE — 99396 PREV VISIT EST AGE 40-64: CPT | Performed by: INTERNAL MEDICINE

## 2025-08-22 PROCEDURE — 3008F BODY MASS INDEX DOCD: CPT | Performed by: INTERNAL MEDICINE

## 2025-08-22 PROCEDURE — 3074F SYST BP LT 130 MM HG: CPT | Performed by: INTERNAL MEDICINE

## 2025-08-22 PROCEDURE — 3079F DIAST BP 80-89 MM HG: CPT | Performed by: INTERNAL MEDICINE

## 2025-08-22 RX ORDER — PANTOPRAZOLE SODIUM 40 MG/1
40 TABLET, DELAYED RELEASE ORAL 2 TIMES DAILY
Qty: 180 TABLET | Refills: 1 | Status: SHIPPED | OUTPATIENT
Start: 2025-08-22

## 2025-08-25 ENCOUNTER — APPOINTMENT (OUTPATIENT)
Dept: PRIMARY CARE | Facility: CLINIC | Age: 50
End: 2025-08-25
Payer: COMMERCIAL

## 2025-08-25 RX ORDER — PANTOPRAZOLE SODIUM 40 MG/1
40 TABLET, DELAYED RELEASE ORAL 2 TIMES DAILY
Qty: 180 TABLET | Refills: 1 | Status: SHIPPED | OUTPATIENT
Start: 2025-08-25

## 2026-01-05 ENCOUNTER — APPOINTMENT (OUTPATIENT)
Dept: BEHAVIORAL HEALTH | Facility: CLINIC | Age: 51
End: 2026-01-05
Payer: COMMERCIAL

## (undated) DEVICE — SCOPE WARMER, LAPAROSCOPE, BAG ONLY, LF

## (undated) DEVICE — CLIP, LIGATING, HEM-O-LOCK, MEDIUM/LARGE, LF, GREEN

## (undated) DEVICE — ADHESIVE, SKIN, LIQUIBAND EXCEED

## (undated) DEVICE — CANNULA, KII ADVANCED FIXATION, 5X100MM W/SEAL

## (undated) DEVICE — PUMP, STRYKERFLOW 2 & HANDPIECE W/10FT. IRRIGATION TUBING

## (undated) DEVICE — GLOVE, SURGICAL, PROTEXIS PI ORTHO, 7.0, PF, LF

## (undated) DEVICE — TUBE SET, PNEUMOLAR HEATED, SMOKE EVACU, HIGH-FLOW

## (undated) DEVICE — Device

## (undated) DEVICE — HOLSTER, JET SAFETY

## (undated) DEVICE — CARE KIT, LAPAROSCOPIC, ADVANCED

## (undated) DEVICE — SUTURE, VICRYL, 0, 27 IN, UR-6, VIOLET

## (undated) DEVICE — CORD, MONOPOLAR, HIGH FREQUENCY, W/8MM PLUG F/VALLEYLAB, 8FT/244CM, STRL

## (undated) DEVICE — SOLUTION, INJECTION, USP, SODIUM CHLORIDE 0.9%, .9, NACL, 1000 ML, BAG

## (undated) DEVICE — TROCAR SYSTEM, BALLOON, KII GELPORT, 12 X 100MM

## (undated) DEVICE — SUTURE, MONOCRYL, 4-0, 18 IN, PS2, UNDYED

## (undated) DEVICE — RETRIEVAL SYSTEM, MONARCH, 10MM DISP ENDOSCOPIC